# Patient Record
Sex: FEMALE | Race: WHITE | NOT HISPANIC OR LATINO | Employment: OTHER | URBAN - METROPOLITAN AREA
[De-identification: names, ages, dates, MRNs, and addresses within clinical notes are randomized per-mention and may not be internally consistent; named-entity substitution may affect disease eponyms.]

---

## 2017-11-30 ENCOUNTER — PATIENT OUTREACH (OUTPATIENT)
Dept: HEALTH INFORMATION MANAGEMENT | Facility: OTHER | Age: 52
End: 2017-11-30

## 2017-11-30 NOTE — PROGRESS NOTES
Outcome: Left Message    Please transfer to Patient Outreach Team at [phone] when patient returns call.    WebIZ Checked & Epic Updated:  yes    [facility] Verified: yes    Attempt # 1

## 2017-12-20 ENCOUNTER — APPOINTMENT (OUTPATIENT)
Dept: RADIOLOGY | Facility: MEDICAL CENTER | Age: 52
End: 2017-12-20
Attending: GENERAL PRACTICE
Payer: MEDICARE

## 2018-01-03 ENCOUNTER — HOSPITAL ENCOUNTER (OUTPATIENT)
Dept: RADIOLOGY | Facility: MEDICAL CENTER | Age: 53
End: 2018-01-03
Attending: GENERAL PRACTICE
Payer: MEDICARE

## 2018-01-03 DIAGNOSIS — Z12.39 SCREENING FOR BREAST CANCER: ICD-10-CM

## 2018-01-03 PROCEDURE — 77067 SCR MAMMO BI INCL CAD: CPT

## 2018-03-07 ENCOUNTER — HOSPITAL ENCOUNTER (OUTPATIENT)
Facility: MEDICAL CENTER | Age: 53
End: 2018-03-07
Attending: GENERAL PRACTICE
Payer: MEDICARE

## 2018-03-07 LAB
BASOPHILS # BLD AUTO: 0.9 % (ref 0–1.8)
BASOPHILS # BLD: 0.09 K/UL (ref 0–0.12)
EOSINOPHIL # BLD AUTO: 0.17 K/UL (ref 0–0.51)
EOSINOPHIL NFR BLD: 1.7 % (ref 0–6.9)
ERYTHROCYTE [DISTWIDTH] IN BLOOD BY AUTOMATED COUNT: 43.2 FL (ref 35.9–50)
ESTRADIOL SERPL-MCNC: 21 PG/ML
HCT VFR BLD AUTO: 52.2 % (ref 37–47)
HGB BLD-MCNC: 17 G/DL (ref 12–16)
IMM GRANULOCYTES # BLD AUTO: 0.02 K/UL (ref 0–0.11)
IMM GRANULOCYTES NFR BLD AUTO: 0.2 % (ref 0–0.9)
LYMPHOCYTES # BLD AUTO: 3.29 K/UL (ref 1–4.8)
LYMPHOCYTES NFR BLD: 32.3 % (ref 22–41)
MCH RBC QN AUTO: 30.6 PG (ref 27–33)
MCHC RBC AUTO-ENTMCNC: 32.6 G/DL (ref 33.6–35)
MCV RBC AUTO: 94.1 FL (ref 81.4–97.8)
MONOCYTES # BLD AUTO: 0.49 K/UL (ref 0–0.85)
MONOCYTES NFR BLD AUTO: 4.8 % (ref 0–13.4)
NEUTROPHILS # BLD AUTO: 6.13 K/UL (ref 2–7.15)
NEUTROPHILS NFR BLD: 60.1 % (ref 44–72)
NRBC # BLD AUTO: 0 K/UL
NRBC BLD-RTO: 0 /100 WBC
PLATELET # BLD AUTO: 311 K/UL (ref 164–446)
PMV BLD AUTO: 12 FL (ref 9–12.9)
RBC # BLD AUTO: 5.55 M/UL (ref 4.2–5.4)
WBC # BLD AUTO: 10.2 K/UL (ref 4.8–10.8)

## 2018-03-07 PROCEDURE — 80053 COMPREHEN METABOLIC PANEL: CPT

## 2018-03-07 PROCEDURE — 82785 ASSAY OF IGE: CPT

## 2018-03-07 PROCEDURE — 84403 ASSAY OF TOTAL TESTOSTERONE: CPT

## 2018-03-07 PROCEDURE — 84144 ASSAY OF PROGESTERONE: CPT

## 2018-03-07 PROCEDURE — 85025 COMPLETE CBC W/AUTO DIFF WBC: CPT

## 2018-03-07 PROCEDURE — 86003 ALLG SPEC IGE CRUDE XTRC EA: CPT | Mod: 91

## 2018-03-07 PROCEDURE — 82670 ASSAY OF TOTAL ESTRADIOL: CPT

## 2018-03-08 LAB
ALBUMIN SERPL BCP-MCNC: 4.8 G/DL (ref 3.2–4.9)
ALBUMIN/GLOB SERPL: 1.8 G/DL
ALP SERPL-CCNC: 85 U/L (ref 30–99)
ALT SERPL-CCNC: 31 U/L (ref 2–50)
ANION GAP SERPL CALC-SCNC: 16 MMOL/L (ref 0–11.9)
AST SERPL-CCNC: 23 U/L (ref 12–45)
BILIRUB SERPL-MCNC: 0.6 MG/DL (ref 0.1–1.5)
BUN SERPL-MCNC: 15 MG/DL (ref 8–22)
CALCIUM SERPL-MCNC: 9.8 MG/DL (ref 8.5–10.5)
CHLORIDE SERPL-SCNC: 107 MMOL/L (ref 96–112)
CO2 SERPL-SCNC: 20 MMOL/L (ref 20–33)
CREAT SERPL-MCNC: 0.93 MG/DL (ref 0.5–1.4)
GLOBULIN SER CALC-MCNC: 2.7 G/DL (ref 1.9–3.5)
GLUCOSE SERPL-MCNC: 92 MG/DL (ref 65–99)
POTASSIUM SERPL-SCNC: 4.5 MMOL/L (ref 3.6–5.5)
PROT SERPL-MCNC: 7.5 G/DL (ref 6–8.2)
SODIUM SERPL-SCNC: 143 MMOL/L (ref 135–145)

## 2018-03-10 LAB
A ALTERNATA IGE QN: <0.1 KU/L
A FUMIGATUS IGE QN: <0.1 KU/L
BERMUDA GRASS IGE QN: <0.1 KU/L
BOXELDER IGE QN: <0.1 KU/L
C SPHAEROSPERMUM IGE QN: <0.1 KU/L
CASHEW NUT IGE QN: <0.1 KU/L
CAT DANDER IGE QN: <0.1 KU/L
CMN PIGWEED IGE QN: <0.1 KU/L
COMMON RAGWEED IGE QN: <0.1 KU/L
COTTONWOOD IGE QN: <0.1 KU/L
COW MILK IGE QN: <0.1 KU/L
D FARINAE IGE QN: <0.1 KU/L
D PTERONYSS IGE QN: <0.1 KU/L
DEPRECATED MISC ALLERGEN IGE RAST QL: NORMAL
DEPRECATED MISC ALLERGEN IGE RAST QL: NORMAL
DOG DANDER IGE QN: <0.1 KU/L
IGE SERPL-ACNC: 5 KU/L
M RACEMOSUS IGE QN: <0.1 KU/L
MOUSE EPITH IGE QN: <0.1 KU/L
MT JUNIPER IGE QN: <0.1 KU/L
MUGWORT IGE QN: <0.1 KU/L
OLIVE POLN IGE QN: <0.1 KU/L
P NOTATUM IGE QN: <0.1 KU/L
PEANUT IGE QN: <0.1 KU/L
ROACH IGE QN: <0.1 KU/L
SALTWORT IGE QN: <0.1 KU/L
SOYBEAN IGE QN: <0.1 KU/L
TEST NAME 95000: NORMAL
TIMOTHY IGE QN: <0.1 KU/L
TUNA IGE QN: <0.1 KU/L
WALNUT IGE QN: <0.1 KU/L
WHEAT IGE QN: <0.1 KU/L
WHITE ELM IGE QN: <0.1 KU/L
WHITE MULBERRY IGE QN: <0.1 KU/L
WHITE OAK IGE QN: <0.1 KU/L

## 2018-03-11 LAB
ALMOND IGE QN: <0.1 KU/L
CODFISH IGE QN: <0.1 KU/L
COW MILK IGE QN: <0.1 KU/L
EGG WHITE IGE QN: <0.1 KU/L
HAZELNUT IGE QN: <0.1 KU/L
PEANUT IGE QN: <0.1 KU/L
SALMON IGE QN: <0.1 KU/L
SCALLOP IGE QN: <0.1 KU/L
SESAME SEED IGE QN: <0.1 KU/L
SHRIMP IGE QN: <0.1 KU/L
TEST NAME 95000: NORMAL

## 2018-05-31 ENCOUNTER — TELEPHONE (OUTPATIENT)
Dept: MEDICAL GROUP | Facility: MEDICAL CENTER | Age: 53
End: 2018-05-31

## 2018-05-31 DIAGNOSIS — Z79.890 HORMONE REPLACEMENT THERAPY: ICD-10-CM

## 2018-05-31 NOTE — TELEPHONE ENCOUNTER
1. Caller Name: reed                                         Call Back Number: [phone] (home)         Patient approves a detailed voicemail message: yes    patient is new to youon 06/14/2018 at 2pm she would like to know if you would be able to give her enough estradiol patches to last her until appt. Per patient she cant request refill previous PCP due to office already closed. Please advise

## 2018-06-14 ENCOUNTER — OFFICE VISIT (OUTPATIENT)
Dept: MEDICAL GROUP | Facility: MEDICAL CENTER | Age: 53
End: 2018-06-14
Payer: MEDICARE

## 2018-06-14 VITALS
DIASTOLIC BLOOD PRESSURE: 78 MMHG | SYSTOLIC BLOOD PRESSURE: 118 MMHG | WEIGHT: 190.4 LBS | BODY MASS INDEX: 27.26 KG/M2 | TEMPERATURE: 96.8 F | OXYGEN SATURATION: 97 % | HEIGHT: 70 IN | HEART RATE: 88 BPM | RESPIRATION RATE: 16 BRPM

## 2018-06-14 DIAGNOSIS — N95.2 ATROPHIC VAGINITIS: ICD-10-CM

## 2018-06-14 DIAGNOSIS — Z12.11 SCREENING FOR COLORECTAL CANCER: ICD-10-CM

## 2018-06-14 DIAGNOSIS — Z79.890 HORMONE REPLACEMENT THERAPY (HRT): ICD-10-CM

## 2018-06-14 DIAGNOSIS — Z13.6 SCREENING FOR CARDIOVASCULAR CONDITION: ICD-10-CM

## 2018-06-14 DIAGNOSIS — R63.5 WEIGHT GAIN: ICD-10-CM

## 2018-06-14 DIAGNOSIS — Z86.69 HISTORY OF SLEEP APNEA: ICD-10-CM

## 2018-06-14 DIAGNOSIS — Z12.12 SCREENING FOR COLORECTAL CANCER: ICD-10-CM

## 2018-06-14 DIAGNOSIS — F17.210 CIGARETTE NICOTINE DEPENDENCE WITHOUT COMPLICATION: ICD-10-CM

## 2018-06-14 PROCEDURE — 99204 OFFICE O/P NEW MOD 45 MIN: CPT | Performed by: NURSE PRACTITIONER

## 2018-06-14 RX ORDER — CITALOPRAM 20 MG/1
TABLET ORAL
COMMUNITY
Start: 2018-03-19 | End: 2018-06-14 | Stop reason: SDUPTHER

## 2018-06-14 RX ORDER — ESTRADIOL 0.05 MG/D
1 PATCH TRANSDERMAL
Qty: 4 PATCH | Refills: 4 | Status: SHIPPED | OUTPATIENT
Start: 2018-06-14 | End: 2018-10-29 | Stop reason: SDUPTHER

## 2018-06-14 RX ORDER — CITALOPRAM 20 MG/1
20 TABLET ORAL DAILY
Qty: 30 TAB | Refills: 6 | Status: SHIPPED | OUTPATIENT
Start: 2018-06-14 | End: 2019-01-15 | Stop reason: SDUPTHER

## 2018-06-14 RX ORDER — PHENTERMINE HYDROCHLORIDE 37.5 MG/1
37.5 CAPSULE ORAL EVERY MORNING
Qty: 30 CAP | Refills: 0 | Status: SHIPPED | OUTPATIENT
Start: 2018-06-14 | End: 2018-07-14

## 2018-06-14 RX ORDER — ESTRADIOL 0.03 MG/D
FILM, EXTENDED RELEASE TRANSDERMAL
COMMUNITY
Start: 2018-04-25 | End: 2018-06-14

## 2018-06-14 ASSESSMENT — PATIENT HEALTH QUESTIONNAIRE - PHQ9: CLINICAL INTERPRETATION OF PHQ2 SCORE: 0

## 2018-06-14 NOTE — LETTER
[employer]  MATILDE FunezP.R.N.  [address] [facility] [address]  [place] [address]  Fax: [phone]   Authorization for Release/Disclosure of   Protected Health Information   Name: JESSICA DAVIS : 1965 SSN: [id number]   Address: [address] [place] [address]  [place] [address] Phone:    [phone] (home)    I authorize the entity listed below to release/disclose the PHI below to:   [employer]/Christie Delatorre A.P.R.N. and LAUREN Funez.P.R.N.   Provider or Entity Name:  Dr. Knight   Address   City, [facility]   Phone:      Fax:     Reason for request: continuity of care   Information to be released:    [  ] LAST COLONOSCOPY,  including any PATH REPORT and follow-up  [  ] LAST FIT/COLOGUARD RESULT [  ] LAST DEXA  [  ] LAST MAMMOGRAM  [  ] LAST PAP  [  ] LAST LABS [  ] RETINA EXAM REPORT  [  ] IMMUNIZATION RECORDS  [ x ] Release all info      [  ] Check here and initial the line next to each item to release ALL health information INCLUDING  _____ Care and treatment for drug and / or alcohol abuse  _____ HIV testing, infection status, or AIDS  _____ Genetic Testing    DATES OF SERVICE OR TIME PERIOD TO BE DISCLOSED: _____________  I understand and acknowledge that:  * This Authorization may be revoked at any time by you in writing, except if your health information has already been used or disclosed.  * Your health information that will be used or disclosed as a result of you signing this authorization could be re-disclosed by the recipient. If this occurs, your re-disclosed health information may no longer be protected by State or Federal laws.  * You may refuse to sign this Authorization. Your refusal will not affect your ability to obtain treatment.  * This Authorization becomes effective upon signing and will  on (date) __________.      If no date is indicated, this Authorization will  one (1) year from the signature date.    Name: Jessica Davis    Signature:   Date:       6/14/2018       PLEASE FAX REQUESTED RECORDS BACK TO: [phone]

## 2018-06-14 NOTE — Clinical Note
CHART REVIEW. Also how likely is PV.. I am repeating the CBC should I order Jak2 or epo or do the repeat CBC firs then go from there.

## 2018-06-15 ENCOUNTER — SUPERVISING PHYSICIAN REVIEW (OUTPATIENT)
Dept: MEDICAL GROUP | Facility: MEDICAL CENTER | Age: 53
End: 2018-06-15

## 2018-06-15 PROBLEM — Z13.6 SCREENING FOR CARDIOVASCULAR CONDITION: Status: RESOLVED | Noted: 2018-06-15 | Resolved: 2018-06-15

## 2018-06-15 PROBLEM — Z86.69 HISTORY OF SLEEP APNEA: Status: ACTIVE | Noted: 2018-06-15

## 2018-06-15 PROBLEM — Z13.6 SCREENING FOR CARDIOVASCULAR CONDITION: Status: ACTIVE | Noted: 2018-06-15

## 2018-06-15 NOTE — PROGRESS NOTES
"CC: To establish care and medication refill      Jessica Davis is a 52 y.o. female here to establish care and to discuss the evaluation and management of:    1. Hormone replacement therapy (HRT)  Patient states that she has been taking the estradiol patches for approximately 2 years. States that she's been on the 0.025mg  dose twice weekly. Has not followed up with GYN in over 2 years as her previous provider has since left the office she was going to. Patient states that she also takes citalopram her to help with her\"short fuse\" that she has. Denies any depression.    2  Atrophic vaginitis  Patient states that she's been having vaginal itching for several years. States that she is been tested for numerous amounts of allergies and nothing has come up suggesting that she has some sort of allergy causing this vaginal itching. Patient states that she does suffer from vaginal dryness as well. States she has tried coconut oil, topical estrogen and numerous other products. Patient states she's also had a biopsy completed to see if there are some sort of growth or skin disorder and reports that there is no definitive diagnosis.    3. Weight gain  Patient states that she last took phentermine and October of last year. States that she'll take it every 3rd day in conjunction with working on her diet. States that she has been having some weight gain recently and is requesting a refill on phentermine. States tolerated it well without any heart palpitations, racing heart, elevated blood pressure or chest pain.    4. Cigarette nicotine dependence without complication  Patient does continue to smoke cigarettes daily.    5. Screening for colorectal cancer  Has not had any colorectal cancer screening    6. History of sleep apnea  Patient states that she was told that she has a history of having sleep apnea and has had a sleep study done. Patient states that she has a very \"mild form.\" States that at some point she tried a " "CPAP, but does not currently wear,  however is not interested in going back to have sleep studies done nor interested in wearing a CPAP. States she thinks she just needs \"a little oxygen at nighttime \"states she might just try using her mother's oxygen at nighttime. Does state she does have fatigue from today.      ROS:  Denies any Headache, Blurred Vision, Confusion Chest pain,  Shortness of breath,  Abdominal pain, Changes of bowel or bladder, Lower ext edema, Fevers, Nights sweats, Weight Changes, Focal weakness or numbness.  All other systems are negative. Vaginal itching and dryness, fatigue, weight gain      Current Outpatient Prescriptions:   •  estradiol (CLIMARA) 0.05 MG/24HR PATCH WEEKLY, Apply 1 Patch to skin as directed every 7 days., Disp: 4 Patch, Rfl: 4  •  citalopram (CELEXA) 20 MG Tab, Take 1 Tab by mouth every day., Disp: 30 Tab, Rfl: 6  •  phentermine 37.5 MG capsule, Take 1 Cap by mouth every morning for 30 days., Disp: 30 Cap, Rfl: 0    Allergies   Allergen Reactions   • Percocet [Apap-Fd&C Red #40 [place]-Oxycodone] Hives     itching   • Vicodin [Hydrocodone-Acetaminophen] Hives   • Nkda [No Known Drug Allergy]        Past Medical History:   Diagnosis Date   • Arthritis     back   • Gynecological disorder     had hysterectomy   • Hypertension    • S/P endometrial ablation 3/2/2016    2005 ablation done. EMB in 2009- benign proliferative.    • Sleep apnea     mild- no cpap   • Snoring    • Thickened endometrium 3/28/2016     Past Surgical History:   Procedure Laterality Date   • CYSTOSCOPY N/A 5/27/2016    Procedure: CYSTOSCOPY Possible;  Surgeon: Amanda Ramey M.D.;  Location: SURGERY SAME DAY [facility];  Service:    • VAGINAL HYSTERECTOMY SCOPE TOTAL N/A 5/27/2016    Procedure: ATTEMPTED VAGINAL HYSTERECTOMY SCOPE TOTAL WITH BILATERAL SALPINGECTOMY converted to laparotomy;  Surgeon: Amanda Ramey M.D.;  Location: SURGERY SAME DAY [facility];  Service:    • ABDOMINAL HYSTERECTOMY " "TOTAL N/A 5/27/2016    Procedure: ABDOMINAL HYSTERECTOMY TOTAL WITH BILATERAL SALPINGectomy, Left OOPHERECTOMY. dense pelvic adhesiolysis.   partial omentectomy. ;  Surgeon: Amanda Ramey M.D.;  Location: SURGERY SAME DAY [facility];  Service:    • HYSTEROSCOPY WITH VIDEO DIAGNOSTIC N/A 3/28/2016    Procedure: HYSTEROSCOPY WITH VIDEO DIAGNOSTIC AND VULVAR BIOPSY;  Surgeon: Amanda Ramey M.D.;  Location: SURGERY SAME DAY [facility];  Service:    • DILATION AND CURETTAGE N/A 3/28/2016    Procedure: DILATION AND CURETTAGE;  Surgeon: Amanda Ramey M.D.;  Location: SURGERY SAME DAY [facility];  Service:    • CHOLECYSTECTOMY     • GYN SURGERY      ablation   • OTHER      left breast lumpectomy   • OTHER NEUROLOGICAL SURG      back surgery x2     Family History   Problem Relation Age of Onset   • Dementia Mother    • Alzheimer's Disease Father    • Cancer Father      prostate, stage four pelvic      Social History     Social History   • Marital status:      Spouse name: N/A   • Number of children: N/A   • Years of education: N/A     Occupational History   • Not on file.     Social History Main Topics   • Smoking status: Current Every Day Smoker     Packs/day: 0.50     Years: 35.00     Types: Cigarettes   • Smokeless tobacco: Never Used   • Alcohol use Yes      Comment: occ   • Drug use: No   • Sexual activity: Not on file     Other Topics Concern   • Not on file     Social History Narrative   • No narrative on file       Objective:     Vitals: /78   Pulse 88   Temp 36 °C (96.8 °F)   Resp 16   Ht 1.777 m (5' 9.98\")   Wt 86.4 kg (190 lb 6.4 oz)   SpO2 97%   BMI 27.34 kg/m²      General: Alert, pleasant, NAD  HEENT:  Normocephalic. Neck supple.  No thyromegaly or masses palpated. No cervical or supraclavicular lymphadenopathy.  Heart:  Regular rate and rhythm.  S1 and S2 normal.  No murmurs appreciated.  Respiratory:  Normal respiratory effort.  Clear to auscultation bilaterally.  "   Skin:  Warm, dry, no rashes  Musculoskeletal:  Gait is normal.  Moves all extremities well.  Extremities:   No leg edema.  Neurological: No tremors, sensation grossly intact  Psych:  Affect/mood is normal, judgement is good, memory is intact, grooming is appropriate.      Assessment and Plan.   52 y.o. Female to establish care and discuss the followin. Hormone replacement therapy (HRT)  Discussed with patient that long-term use of hormone replacement therapy is not recommended. We will keep her on the low dose and change her from twice weekly to once weekly of the 0.05 milligram patches. Also discussed with patient that she will need to establish with another GYN provider to follow up with hormone replacement therapy..    2. Atrophic vaginitis  Chronic. Patient states that she's been worked up for multitude of diagnoses that could be causing the itching in her vagina. States that she's been using grapeseed oil and that seems to be moderately effective at this time. States she's also had biopsies completed by her GYN in the past with no definitive diagnosis. Advised patient to try OTC Replens to see if this can moisturize and help reduce dryness.     3. Weight gain  Had a long discussion with the patient that this medication is used in conjunction with diet and exercise. She'll need to continue to work on having a heart healthy diet, rich in fruits and vegetables and whole grains, limits the consumption of greasy, fatty, high sugary foods. Narx Check completed. This is the first prescription for this medication this year.   - phentermine 37.5 MG capsule; Take 1 Cap by mouth every morning for 30 days.  Dispense: 30 Cap; Refill: 0    4. Cigarette nicotine dependence without complication  Discussed with patient the importance of reducing tobacco consumption. Educated patient regarding the effects of tobacco use on cardiovascular system.    5. Screening for colorectal cancer    - OCCULT BLOOD FECES IMMUNOASSAY  (FIT); Future    6. History of sleep apnea  Patient declines referral back to sleep studies for assessment of sleep apnea and need for CPAP machine. Patient states that she is not interested in doing this at this time. Discussed with patient long-term side effects of uncontrolled sleep apnea. Patient also did have an elevated hemoglobin 17 and hematocrit 52.2 on her most recent labs March 2018 labs. Could be due to sleep apnea or underlying diagnosis of ? Polycythemia Vera.  - CBC WITHOUT DIFFERENTIAL; Future    7. Screening for cardiovascular condition    - LIPID PROFILE; Future      Health Maintenance:     Return in about 3 months (around 9/14/2018).        Christie GRAVES.

## 2018-06-15 NOTE — PROGRESS NOTES
I have reviewed and agree with history, assessment, and plan for office encounter on 6/14/2018 with KEVIN Rizvi.    Face to face encounter/direct observation: no    Suggested changes to plan or follow-up: none

## 2018-08-07 ENCOUNTER — HOSPITAL ENCOUNTER (OUTPATIENT)
Dept: LAB | Facility: MEDICAL CENTER | Age: 53
End: 2018-08-07
Attending: NURSE PRACTITIONER
Payer: MEDICARE

## 2018-08-07 DIAGNOSIS — Z13.6 SCREENING FOR CARDIOVASCULAR CONDITION: ICD-10-CM

## 2018-08-07 DIAGNOSIS — Z86.69 HISTORY OF SLEEP APNEA: ICD-10-CM

## 2018-08-07 LAB
CHOLEST SERPL-MCNC: 236 MG/DL (ref 100–199)
ERYTHROCYTE [DISTWIDTH] IN BLOOD BY AUTOMATED COUNT: 42 FL (ref 35.9–50)
HCT VFR BLD AUTO: 50.5 % (ref 37–47)
HDLC SERPL-MCNC: 42 MG/DL
HGB BLD-MCNC: 16.3 G/DL (ref 12–16)
LDLC SERPL CALC-MCNC: 134 MG/DL
MCH RBC QN AUTO: 29.7 PG (ref 27–33)
MCHC RBC AUTO-ENTMCNC: 32.3 G/DL (ref 33.6–35)
MCV RBC AUTO: 92 FL (ref 81.4–97.8)
PLATELET # BLD AUTO: 292 K/UL (ref 164–446)
PMV BLD AUTO: 11.3 FL (ref 9–12.9)
RBC # BLD AUTO: 5.49 M/UL (ref 4.2–5.4)
TRIGL SERPL-MCNC: 299 MG/DL (ref 0–149)
WBC # BLD AUTO: 10.3 K/UL (ref 4.8–10.8)

## 2018-08-07 PROCEDURE — 85027 COMPLETE CBC AUTOMATED: CPT

## 2018-08-07 PROCEDURE — 36415 COLL VENOUS BLD VENIPUNCTURE: CPT

## 2018-08-07 PROCEDURE — 80061 LIPID PANEL: CPT

## 2018-08-08 PROBLEM — E78.5 DYSLIPIDEMIA: Status: ACTIVE | Noted: 2018-08-08

## 2018-09-05 ENCOUNTER — HOSPITAL ENCOUNTER (OUTPATIENT)
Facility: MEDICAL CENTER | Age: 53
End: 2018-09-05
Attending: NURSE PRACTITIONER
Payer: MEDICARE

## 2018-09-05 PROCEDURE — 82274 ASSAY TEST FOR BLOOD FECAL: CPT

## 2018-09-09 DIAGNOSIS — Z12.12 SCREENING FOR COLORECTAL CANCER: ICD-10-CM

## 2018-09-09 DIAGNOSIS — Z12.11 SCREENING FOR COLORECTAL CANCER: ICD-10-CM

## 2018-09-09 LAB — HEMOCCULT STL QL IA: NEGATIVE

## 2018-10-29 RX ORDER — ESTRADIOL 0.05 MG/D
1 PATCH TRANSDERMAL
Qty: 4 PATCH | Refills: 4 | Status: SHIPPED | OUTPATIENT
Start: 2018-10-29 | End: 2019-07-18 | Stop reason: SDUPTHER

## 2018-11-06 ENCOUNTER — OFFICE VISIT (OUTPATIENT)
Dept: MEDICAL GROUP | Facility: MEDICAL CENTER | Age: 53
End: 2018-11-06
Payer: MEDICARE

## 2018-11-06 VITALS
RESPIRATION RATE: 16 BRPM | WEIGHT: 197 LBS | BODY MASS INDEX: 29.18 KG/M2 | SYSTOLIC BLOOD PRESSURE: 144 MMHG | OXYGEN SATURATION: 96 % | HEART RATE: 80 BPM | HEIGHT: 69 IN | TEMPERATURE: 98.6 F | DIASTOLIC BLOOD PRESSURE: 86 MMHG

## 2018-11-06 DIAGNOSIS — Z88.9 MULTIPLE ALLERGIES: ICD-10-CM

## 2018-11-06 DIAGNOSIS — F17.210 CIGARETTE NICOTINE DEPENDENCE WITHOUT COMPLICATION: ICD-10-CM

## 2018-11-06 DIAGNOSIS — L29.9 ITCHY SKIN: ICD-10-CM

## 2018-11-06 DIAGNOSIS — L30.9 ECZEMA, UNSPECIFIED TYPE: ICD-10-CM

## 2018-11-06 PROCEDURE — 99214 OFFICE O/P EST MOD 30 MIN: CPT | Performed by: FAMILY MEDICINE

## 2018-11-06 RX ORDER — TRIAMCINOLONE ACETONIDE 1 MG/G
CREAM TOPICAL
Qty: 60 G | Refills: 1 | Status: SHIPPED | OUTPATIENT
Start: 2018-11-06

## 2018-11-06 NOTE — PATIENT INSTRUCTIONS
Chantix/varenicline:   Initial:  Days 1 to 3: 0.5 mg once daily  Days 4 to 7: 0.5 mg twice daily  Maintenance (? Day 8): 1 mg twice daily for 11 weeks; may consider a temporary or permanent dose reduction if usual dose is not tolerated.    Note: Start 1 week before target quit date.  If able to successfully quit smoking at the end of the 12 weeks, may continue for another 12 weeks to help maintain success.

## 2018-11-06 NOTE — PROGRESS NOTES
"cc: Allergies    Subjective:     Jessica Davis is a 52 y.o. female presenting for a Kenalog shot for allergies.  She reports getting Kenalog shots every 6 months for the past several years.  Her last dose was about a year ago.  She has never seen an allergist.  She states that she usually gets a Kenalog shots for skin allergies.  She describes itchy skin on her shoulders bilaterally that is quite intrusive.  She uses hydrocortisone cream which is somewhat helpful.  She also uses Benadryl at night, nasal sprays.  She is also tried multiple different lotions over-the-counter, which have not been helpful.  She does smoke tobacco.  She believes she is allergic to multiple things, but is not sure what exactly are her triggers.  She reports getting blood tests for multiple different allergens in March, but nothing definitive was seen.        Review of systems:  See above.       Current Outpatient Prescriptions:   •  triamcinolone acetonide (KENALOG) 0.1 % Cream, Apply thin layer to affected area twice a day as needed for eczema, do not use longer than 14 days at a time., Disp: 60 g, Rfl: 1  •  varenicline (CHANTIX STARTING MONTH PAK) 0.5 MG X 11 & 1 MG X 42 tablet, Days 1-3: 0.5 mg once daily. Days 4-7: 0.5 mg twice daily. Then 1 mg twice daily., Disp: 56 Tab, Rfl: 0  •  estradiol (CLIMARA) 0.05 MG/24HR PATCH WEEKLY, Apply 1 Patch to skin as directed every 7 days., Disp: 4 Patch, Rfl: 4  •  hydrocortisone 2.5 % Cream topical cream, Apply 1 g to affected area(s) 2 times a day., Disp: 1 Tube, Rfl: 1  •  citalopram (CELEXA) 20 MG Tab, Take 1 Tab by mouth every day., Disp: 30 Tab, Rfl: 6    Allergies, past medical history, past surgical history, family history, social history reviewed and updated    Objective:     Vitals: /86 (BP Location: Right arm, Patient Position: Sitting)   Pulse 80   Temp 37 °C (98.6 °F) (Temporal)   Resp 16   Ht 1.753 m (5' 9\")   Wt 89.4 kg (197 lb)   SpO2 96%   BMI 29.09 kg/m² "   General: Alert, pleasant, NAD  HEENT: Normocephalic.    Skin: Warm, dry.  No rashes over the left shoulder and upper extremity.  Faint scaley, dry patches on the right shoulder  Psych:  Affect/mood is normal, judgement is good, memory is intact, grooming is appropriate.    Assessment/Plan:     Diagnoses and all orders for this visit:    Eczema, unspecified type  Itchy skin  Multiple allergies  Discussed holding off on the Kenalog injections today, discussed risks of Kenalog .  Instead, recommended triamcinolone cream to the affected areas.  Recommended discussing with a specialist if she feels like she needs kenalog, referral placed.  She will follow-up with her regular provider in 3 months.  -     REFERRAL TO ALLERGY        -     triamcinolone acetonide (KENALOG) 0.1 % Cream; Apply thin layer to affected area twice a day as needed for eczema, do not use longer than 14 days at a time.    Cigarette nicotine dependence without complication  Advised to quit, patient is somewhat interested.  Discussed trying Chantix, prescription sent to pharmacy  -     varenicline (CHANTIX STARTING MONTH PAK) 0.5 MG X 11 & 1 MG X 42 tablet; Days 1-3: 0.5 mg once daily. Days 4-7: 0.5 mg twice daily. Then 1 mg twice daily.        Return in about 3 months (around 2/6/2019) for routine follow up.

## 2019-01-14 ENCOUNTER — TELEPHONE (OUTPATIENT)
Dept: MEDICAL GROUP | Facility: PHYSICIAN GROUP | Age: 54
End: 2019-01-14

## 2019-01-14 NOTE — TELEPHONE ENCOUNTER
Future Appointments       Provider Department Center    1/15/2019 2:25 PM Jackeline Quinn P.A.-C. [facility]    3/12/2019 3:00 PM [facility] [address]        ESTABLISHED PATIENT PRE-VISIT PLANNING     Patient was NOT contacted to complete PVP.     Note: Patient will not be contacted if there is no indication to call.     1.  Reviewed notes from the last few office visits within the medical group: Yes    2.  If any orders were placed at last visit or intended to be done for this visit (i.e. 6 mos follow-up), do we have Results/Consult Notes?        •  Labs - Labs were not ordered at last office visit.       •  Imaging - Imaging was not ordered at last office visit.       •  Referrals - Referral ordered, patient was seen and consult notes are in chart. Care Teams updated  YES.    3. Is this appointment scheduled as a Hospital Follow-Up? No    4.  Immunizations were updated in [facility] using WebIZ?: Yes       •  Web Iz Recommendations: MMR , TDAP and SHINGRIX (Shingles)    5.  Patient is due for the following Health Maintenance Topics:   Health Maintenance Due   Topic Date Due   • Annual Wellness Visit  1965   • IMM DTaP/Tdap/Td Vaccine (1 - Tdap) 11/21/1983   • MAMMOGRAM  01/03/2019       6. Orders for overdue Health Maintenance topics pended in Pre-Charting? YES    7.  AHA (MDX) form printed for Provider? YES    8.  Patient was informed to arrive 15 min prior to their scheduled appointment and bring in their medication bottles. Confirmed through automated call

## 2019-01-15 ENCOUNTER — OFFICE VISIT (OUTPATIENT)
Dept: MEDICAL GROUP | Facility: PHYSICIAN GROUP | Age: 54
End: 2019-01-15
Payer: MEDICARE

## 2019-01-15 VITALS
HEART RATE: 80 BPM | TEMPERATURE: 99.3 F | BODY MASS INDEX: 29.7 KG/M2 | SYSTOLIC BLOOD PRESSURE: 122 MMHG | HEIGHT: 68 IN | WEIGHT: 196 LBS | OXYGEN SATURATION: 95 % | DIASTOLIC BLOOD PRESSURE: 82 MMHG

## 2019-01-15 DIAGNOSIS — R45.4 IRRITABILITY: ICD-10-CM

## 2019-01-15 DIAGNOSIS — Z79.890 HORMONE REPLACEMENT THERAPY (HRT): ICD-10-CM

## 2019-01-15 DIAGNOSIS — D58.2 ELEVATED HEMOGLOBIN (HCC): ICD-10-CM

## 2019-01-15 DIAGNOSIS — Z86.69 HISTORY OF SLEEP APNEA: ICD-10-CM

## 2019-01-15 DIAGNOSIS — R21 RASH AND NONSPECIFIC SKIN ERUPTION: ICD-10-CM

## 2019-01-15 DIAGNOSIS — N95.2 ATROPHIC VAGINITIS: ICD-10-CM

## 2019-01-15 DIAGNOSIS — E78.5 DYSLIPIDEMIA: ICD-10-CM

## 2019-01-15 DIAGNOSIS — Z12.39 ENCOUNTER FOR SCREENING FOR MALIGNANT NEOPLASM OF BREAST: ICD-10-CM

## 2019-01-15 DIAGNOSIS — F17.210 CIGARETTE NICOTINE DEPENDENCE WITHOUT COMPLICATION: ICD-10-CM

## 2019-01-15 DIAGNOSIS — F41.9 ANXIETY: ICD-10-CM

## 2019-01-15 PROCEDURE — 99214 OFFICE O/P EST MOD 30 MIN: CPT | Performed by: PHYSICIAN ASSISTANT

## 2019-01-15 PROCEDURE — 8041 PR SCP AHA: Performed by: PHYSICIAN ASSISTANT

## 2019-01-15 RX ORDER — CITALOPRAM 20 MG/1
20 TABLET ORAL DAILY
Qty: 90 TAB | Refills: 2 | Status: SHIPPED | OUTPATIENT
Start: 2019-01-15 | End: 2019-09-24

## 2019-01-15 ASSESSMENT — PATIENT HEALTH QUESTIONNAIRE - PHQ9: CLINICAL INTERPRETATION OF PHQ2 SCORE: 0

## 2019-01-15 NOTE — ASSESSMENT & PLAN NOTE
"Elevated cholesterol per lab review, most recently in 8/2018 (below). Patient not currently on any medication for her cholesterol but was on statin previously--\"years ago\" which she states she tolerated fine.    Cholesterol,Tot 236   100 - 199 mg/dL Final   Triglycerides 299   0 - 149 mg/dL Final   HDL 42  >=40 mg/dL Final      <100 mg/dL Final     "

## 2019-01-15 NOTE — ASSESSMENT & PLAN NOTE
Patient has been on HRT since 2016 after she had hysterectomy. Symptoms included hot flashes (which she states started even before the hysterectomy), vaginal dryness, and itching. Initially was using Estring for a short period of time but now is on Climara 0.05 mg/24 HR patch which she changes weekly. Feels that the medication helps with hot flashes and vaginal dryness but not so much with the itching. Has been seeing a dermatologist for this.

## 2019-01-15 NOTE — ASSESSMENT & PLAN NOTE
Patient smokes about 1/2 pack per day and has been smoking for the last 35 years. She would like to quit, but endorses a lot of current anxiety so does not feel she would be able to quit at this time. Previous PCP gave her script for Chantix which she hasn't filled.

## 2019-01-15 NOTE — ASSESSMENT & PLAN NOTE
Ongoing issues with skin rash/itching, worst on the upper arms/shoulders. Is actively seeing dermatologist and was recently switched from hydrocortisone cream to triamcinolone cream which she feels is working much better. States will be having allergy skin testing in the near future to rule out allergy that could be contributing to the rash.

## 2019-01-15 NOTE — PROGRESS NOTES
"Subjective:     Jessica Davis is a 53 y.o. female here today for anxiety/irritability, follow-up on chronic conditions, and Annual Health Assessment. Is a new patient to me and is also establishing care today.    Previous PCP: Franklin Gan MD    HPI: Patient has the following current medical problems:    Hormone replacement therapy (HRT)  Patient has been on HRT since 2016 after she had hysterectomy. Symptoms included hot flashes (which she states started even before the hysterectomy), vaginal dryness, and itching. Initially was using Estring for a short period of time but now is on Climara 0.05 mg/24 HR patch which she changes weekly. Feels that the medication helps with hot flashes and vaginal dryness but not so much with the itching. Has been seeing a dermatologist for this.    History of sleep apnea  Patient endorses previous sleep study \"years ago\" and was told sleep apnea was \"very mild.\" Previous PCP did overnight oximetry test a couple of years ago which was abnormally low. She states it was recommended that she start on supplemental O2, but was never notified that anything was ordered for her.    Dyslipidemia  Elevated cholesterol per lab review, most recently in 8/2018 (below). Patient not currently on any medication for her cholesterol but was on statin previously--\"years ago\" which she states she tolerated fine.    Cholesterol,Tot 236   100 - 199 mg/dL Final   Triglycerides 299   0 - 149 mg/dL Final   HDL 42  >=40 mg/dL Final      <100 mg/dL Final       Cigarette nicotine dependence without complication  Patient smokes about 1/2 pack per day and has been smoking for the last 35 years. She would like to quit, but endorses a lot of current anxiety so does not feel she would be able to quit at this time. Previous PCP gave her script for Chantix which she hasn't filled.    Elevated hemoglobin (HCC)  Noted on previous lab work as far back as 2015. Last CBC in 8/2018.    Rash and nonspecific " "skin eruption  Ongoing issues with skin rash/itching, worst on the upper arms/shoulders. Is actively seeing dermatologist and was recently switched from hydrocortisone cream to triamcinolone cream which she feels is working much better. States will be having allergy skin testing in the near future to rule out allergy that could be contributing to the rash.    Patient also has new concern today for possible ADHD. She states that her adult daughter was just diagnosed with \"adult ADD\" and adult son has as well. She has been doing some research online and feels that a lot of things she has been experiencing may be due to ADD.  She historically has been treated with SSRI--currently citalopram--for irritability/\"short fuse.\" She does feel that it is helping. States that if she's not on her citalopram, she gets \"very overwhelmed\" and will \"blow-up.\" Will watch movie at the theater and not be able to recall what happened in the movie right after it finishes. Has difficulty with retaining information when she reads. Will re-read things multiple times and still not be able to retain the content. Describes feeling anxious when too many things get on top of her. Worries frequently about things which sometimes can interfere with sleep. She is currently retired. At her previous job at the [employer], recalls always putting off the difficult reports to the very last minute. Will be going back to work in the near future and is nervous about how she'll get things done. She has an appointment scheduled with Behavior[facility] for March but is wanting to know if stimulant medication is a possibility to see if it helps.      Health Maintenance Summary                Annual Wellness Visit Overdue 1965     IMM DTaP/Tdap/Td Vaccine Overdue 11/21/1983     MAMMOGRAM Overdue 1/3/2019      Done 1/3/2018 MA-MAMMO SCREENING BILAT W/TOMOSYNTHESIS W/CAD    IMM ZOSTER VACCINES Postponed 11/6/2019 Originally 11/21/2014. Patient " Refused    COLON CANCER SCREENING ANNUAL FIT Next Due 9/5/2019      Done 9/5/2018 OCCULT BLOOD FECES IMMUNOASSAY          Annual Health Assessment Questions:     1.  Are you currently engaging in any exercise or physical activity? Yes    2.  How would you describe your mood or emotional well-being today? anxious    3.  Have you had any falls in the last year? No    4.  Have you noticed any problems with your balance or had difficulty walking? YES Muscle problems in back and leg     5.  In the last six months have you experienced any leakage of urine? No    6. DPA/Advanced Directive: Patient does not have an Advanced Directive.  A packet and workshop information was given on Advanced Directives.      Current medicines (including changes today)  Current Outpatient Prescriptions   Medication Sig Dispense Refill   • citalopram (CELEXA) 20 MG Tab Take 1 Tab by mouth every day. 90 Tab 2   • triamcinolone acetonide (KENALOG) 0.1 % Cream Apply thin layer to affected area twice a day as needed for eczema, do not use longer than 14 days at a time. 60 g 1   • estradiol (CLIMARA) 0.05 MG/24HR PATCH WEEKLY Apply 1 Patch to skin as directed every 7 days. 4 Patch 4     No current facility-administered medications for this visit.        She  has a past medical history of Arthritis; Gynecological disorder; Hypertension; S/P endometrial ablation (3/2/2016); Sleep apnea; Snoring; and Thickened endometrium (3/28/2016).    Percocet [apap-fd&c red #40 [place]-oxycodone]; Vicodin [hydrocodone-acetaminophen]; and Nkda [no known drug allergy]    She  reports that she has been smoking Cigarettes.  She has a 17.50 pack-year smoking history. She has never used smokeless tobacco. She reports that she drinks alcohol. She reports that she does not use drugs.  Ready to quit: Not Answered  Counseling given: Not Answered      ROS   No chest pain, no shortness of breath.       Objective:     Physical Exam:  Blood pressure 122/82, pulse 80,  "temperature 37.4 °C (99.3 °F), height 1.727 m (5' 8\"), weight 88.9 kg (196 lb), SpO2 95 %, not currently breastfeeding. Body mass index is 29.8 kg/m².     Constitutional: Alert, obese but otherwise well-appearing, no distress.  Skin: No rashes in visible areas.  Eye: Conjunctiva clear, lids normal.  ENMT: Lips without lesions, good dentition, oropharynx clear.  Respiratory: Unlabored respiratory effort, lungs clear to auscultation, no wheezes, no rhonchi.  Cardiovascular: Normal S1, S2, no murmur, no edema.  Psych: Alert and oriented x3, normal affect and mood.      Assessment and Plan:     1. Irritability  2. Anxiety  New problems, improved with citalopram but patient concerned she may have adult ADHD. I am recommending that she proceed with psychiatric evaluation before starting any stimulant medication. Should continue her citalopram in the meantime. I am also concerned that untreated sleep apnea may be contributing to some of her brain fog and memory impairment, so I have also recommended follow-up for that with pulmonology.     3. Hormone replacement therapy (HRT)  4. Atrophic vaginitis  Chronic issue, doing well on Climara patch which was started by gynecology. Has been helping with her atrophic vaginitis. Smoking does put her at higher risk of complications. Cessation was encouraged today.    5. History of sleep apnea  Chronic issue per patient. Unable to find any records. I am recommending that she re-establish with pulmonology. Will need repeat sleep study evaluation/oxygen assessment to determine proper therapy.  - REFERRAL TO SLEEP STUDIES    6. Dyslipidemia  Chronic issue, relatively stable per review. Will plan on re-check of lipids and other screening labs in 6 months before next appointment.  - COMP METABOLIC PANEL; Future  - Lipid Profile; Future    7. Elevated hemoglobin (HCC)  Chronic issue per lab review. Suspect related to untreated sleep apnea. Patient will be following up with pulmonology " regarding this. Will repeat CBC with next set of lab work.  - CBC WITH DIFFERENTIAL; Future    8. Cigarette nicotine dependence without complication  Chronic issue, uncontrolled given that she continues to smoke. Cessation strongly encouraged today. Patient not currently interested in any prescription cessation aids.    9. Rash and nonspecific skin eruption  Chronic issue, now reasonably well-controlled with topical triamcinolone cream. Continue current management including regular dermatology follow-up.    10. Encounter for screening for malignant neoplasm of breast  - MA-SCREEN MAMMO W/CAD-BILAT; Future    Patient declines Tdap vaccine.    Discussion today about general wellness and lifestyle habits:    · Engage in regular physical activity and social activities.  · Prevent falls and reduce trip hazards; using ambulatory aides, hearing and vision testing if appropriate.  · Steps to improve urinary incontinence.  · Advanced care planning.    Follow-Up: Return in about 6 months (around 7/15/2019).      Jackeline Quinn P.A.-C.            PLEASE NOTE: This dictation was created using voice recognition software. I have made every reasonable attempt to correct obvious errors, but I expect that there are errors of grammar and possibly content that I did not discover before finalizing the note.

## 2019-01-15 NOTE — ASSESSMENT & PLAN NOTE
"Patient endorses previous sleep study \"years ago\" and was told sleep apnea was \"very mild.\" Previous PCP did overnight oximetry test a couple of years ago which was abnormally low. She states it was recommended that she start on supplemental O2, but was never notified that anything was ordered for her.  "

## 2019-01-21 ENCOUNTER — HOSPITAL ENCOUNTER (OUTPATIENT)
Dept: LAB | Facility: MEDICAL CENTER | Age: 54
End: 2019-01-21
Attending: INTERNAL MEDICINE
Payer: MEDICARE

## 2019-01-21 LAB
BASOPHILS # BLD AUTO: 1 % (ref 0–1.8)
BASOPHILS # BLD: 0.07 K/UL (ref 0–0.12)
CRP SERPL HS-MCNC: 0.18 MG/DL (ref 0–0.75)
EOSINOPHIL # BLD AUTO: 0.12 K/UL (ref 0–0.51)
EOSINOPHIL NFR BLD: 1.6 % (ref 0–6.9)
ERYTHROCYTE [DISTWIDTH] IN BLOOD BY AUTOMATED COUNT: 41.8 FL (ref 35.9–50)
ERYTHROCYTE [SEDIMENTATION RATE] IN BLOOD BY WESTERGREN METHOD: 8 MM/HOUR (ref 0–30)
HCT VFR BLD AUTO: 49.5 % (ref 37–47)
HGB BLD-MCNC: 16.1 G/DL (ref 12–16)
IMM GRANULOCYTES # BLD AUTO: 0.01 K/UL (ref 0–0.11)
IMM GRANULOCYTES NFR BLD AUTO: 0.1 % (ref 0–0.9)
LYMPHOCYTES # BLD AUTO: 2.92 K/UL (ref 1–4.8)
LYMPHOCYTES NFR BLD: 39.7 % (ref 22–41)
MCH RBC QN AUTO: 30 PG (ref 27–33)
MCHC RBC AUTO-ENTMCNC: 32.5 G/DL (ref 33.6–35)
MCV RBC AUTO: 92.2 FL (ref 81.4–97.8)
MONOCYTES # BLD AUTO: 0.36 K/UL (ref 0–0.85)
MONOCYTES NFR BLD AUTO: 4.9 % (ref 0–13.4)
NEUTROPHILS # BLD AUTO: 3.88 K/UL (ref 2–7.15)
NEUTROPHILS NFR BLD: 52.7 % (ref 44–72)
NRBC # BLD AUTO: 0 K/UL
NRBC BLD-RTO: 0 /100 WBC
PLATELET # BLD AUTO: 279 K/UL (ref 164–446)
PMV BLD AUTO: 11.7 FL (ref 9–12.9)
RBC # BLD AUTO: 5.37 M/UL (ref 4.2–5.4)
T4 FREE SERPL-MCNC: 0.76 NG/DL (ref 0.53–1.43)
TSH SERPL DL<=0.005 MIU/L-ACNC: 1.43 UIU/ML (ref 0.38–5.33)
WBC # BLD AUTO: 7.4 K/UL (ref 4.8–10.8)

## 2019-01-21 PROCEDURE — 86038 ANTINUCLEAR ANTIBODIES: CPT

## 2019-01-21 PROCEDURE — 84443 ASSAY THYROID STIM HORMONE: CPT

## 2019-01-21 PROCEDURE — 36415 COLL VENOUS BLD VENIPUNCTURE: CPT

## 2019-01-21 PROCEDURE — 84439 ASSAY OF FREE THYROXINE: CPT

## 2019-01-21 PROCEDURE — 85025 COMPLETE CBC W/AUTO DIFF WBC: CPT

## 2019-01-21 PROCEDURE — 85652 RBC SED RATE AUTOMATED: CPT

## 2019-01-21 PROCEDURE — 82785 ASSAY OF IGE: CPT

## 2019-01-21 PROCEDURE — 86140 C-REACTIVE PROTEIN: CPT

## 2019-01-22 ENCOUNTER — HOSPITAL ENCOUNTER (OUTPATIENT)
Dept: RADIOLOGY | Facility: MEDICAL CENTER | Age: 54
End: 2019-01-22
Attending: PHYSICIAN ASSISTANT
Payer: MEDICARE

## 2019-01-22 DIAGNOSIS — Z12.39 ENCOUNTER FOR SCREENING FOR MALIGNANT NEOPLASM OF BREAST: ICD-10-CM

## 2019-01-22 PROCEDURE — 77063 BREAST TOMOSYNTHESIS BI: CPT

## 2019-01-23 LAB
IGE SERPL-ACNC: 6 KU/L
NUCLEAR IGG SER QL IA: NORMAL

## 2019-01-25 ENCOUNTER — OFFICE VISIT (OUTPATIENT)
Dept: MEDICAL GROUP | Facility: PHYSICIAN GROUP | Age: 54
End: 2019-01-25
Payer: MEDICARE

## 2019-01-25 VITALS
HEIGHT: 68 IN | OXYGEN SATURATION: 96 % | HEART RATE: 64 BPM | BODY MASS INDEX: 28.79 KG/M2 | TEMPERATURE: 98.1 F | WEIGHT: 190 LBS | DIASTOLIC BLOOD PRESSURE: 68 MMHG | SYSTOLIC BLOOD PRESSURE: 124 MMHG

## 2019-01-25 DIAGNOSIS — Z23 NEED FOR VACCINATION: ICD-10-CM

## 2019-01-25 DIAGNOSIS — R22.1 LOCALIZED SWELLING, MASS AND LUMP, NECK: ICD-10-CM

## 2019-01-25 DIAGNOSIS — K02.9 DENTAL DECAY: ICD-10-CM

## 2019-01-25 PROCEDURE — 99214 OFFICE O/P EST MOD 30 MIN: CPT | Performed by: PHYSICIAN ASSISTANT

## 2019-01-25 RX ORDER — TRIAMCINOLONE ACETONIDE 1 MG/G
OINTMENT TOPICAL 2 TIMES DAILY
COMMUNITY

## 2019-01-25 NOTE — Clinical Note
Can't remember if this if 4th or 5th chart for the month. If I still need one more, please let me know. I've been out sick all week but could CC one of my earlier notes.  -Jackeline

## 2019-01-28 ENCOUNTER — HOSPITAL ENCOUNTER (OUTPATIENT)
Dept: LAB | Facility: MEDICAL CENTER | Age: 54
End: 2019-01-28
Attending: PHYSICIAN ASSISTANT
Payer: MEDICARE

## 2019-01-28 ENCOUNTER — HOSPITAL ENCOUNTER (OUTPATIENT)
Dept: RADIOLOGY | Facility: MEDICAL CENTER | Age: 54
End: 2019-01-28
Attending: PHYSICIAN ASSISTANT
Payer: MEDICARE

## 2019-01-28 DIAGNOSIS — K02.9 DENTAL DECAY: ICD-10-CM

## 2019-01-28 DIAGNOSIS — R22.1 LOCALIZED SWELLING, MASS AND LUMP, NECK: ICD-10-CM

## 2019-01-28 LAB
CALCIUM SERPL-MCNC: 10.1 MG/DL (ref 8.5–10.5)
PTH-INTACT SERPL-MCNC: 37.7 PG/ML (ref 14–72)

## 2019-01-28 PROCEDURE — 83970 ASSAY OF PARATHORMONE: CPT

## 2019-01-28 PROCEDURE — 76536 US EXAM OF HEAD AND NECK: CPT

## 2019-01-28 PROCEDURE — 36415 COLL VENOUS BLD VENIPUNCTURE: CPT

## 2019-01-30 ENCOUNTER — SUPERVISING PHYSICIAN REVIEW (OUTPATIENT)
Dept: MEDICAL GROUP | Facility: PHYSICIAN GROUP | Age: 54
End: 2019-01-30

## 2019-01-30 RX ORDER — MONTELUKAST SODIUM 10 MG/1
10 TABLET ORAL DAILY
COMMUNITY
Start: 2019-01-24

## 2019-01-30 NOTE — PROGRESS NOTES
"Subjective:   Jessica Davis is a 53 y.o. female here today for thyroid concern. Is an established patient of mine.    HPI:    Patient presents to the office today with concerns regarding her thyroid. She states that her dentist requested that she come in for an appointment with me. She states that she's been found to have rapid dental decay which her dentist is very concerned about. She states that he and one other dentist did a neck exam and told her that her thyroid felt asymmetric and that she should be seen and have labs done.    Patient herself has not noticed any changes in her neck. Specifically, has not noticed any swelling, lumps, or difficulty swallowing. She occasionally experiences what she describes as a \"cramp\" on the left side of her neck when yawning, but no other pain. She does not have any known history of thyroid disease. Is not on any thyroid medication. She did recently have thyroid labs done by her allergist.    Current medicines (including changes today)  Current Outpatient Prescriptions   Medication Sig Dispense Refill   • triamcinolone acetonide (KENALOG) 0.1 % Ointment Apply  to affected area(s) 2 times a day.     • citalopram (CELEXA) 20 MG Tab Take 1 Tab by mouth every day. 90 Tab 2   • triamcinolone acetonide (KENALOG) 0.1 % Cream Apply thin layer to affected area twice a day as needed for eczema, do not use longer than 14 days at a time. (Patient not taking: Reported on 1/25/2019) 60 g 1   • estradiol (CLIMARA) 0.05 MG/24HR PATCH WEEKLY Apply 1 Patch to skin as directed every 7 days. (Patient not taking: Reported on 1/25/2019) 4 Patch 4     No current facility-administered medications for this visit.      She  has a past medical history of Arthritis; Gynecological disorder; Hypertension; S/P endometrial ablation (3/2/2016); Sleep apnea; Snoring; and Thickened endometrium (3/28/2016).    ROS  As per HPI.       Objective:     Blood pressure 124/68, pulse 64, temperature 36.7 °C " "(98.1 °F), height 1.727 m (5' 8\"), weight 86.2 kg (190 lb), SpO2 96 %, not currently breastfeeding. Body mass index is 28.89 kg/m².     Physical Exam:  Constitutional: Alert, well-appearing, no distress.  Skin: Warm, dry, good turgor, no rashes in visible areas.  Eye: Pupils are equal and round, conjunctiva clear, lids normal.  ENMT: Lips without lesions, moist mucus membranes.  Neck: No masses. No submandibular or cervical lymphadenopathy. No palpable thyromegaly, masses, or tenderness.   Respiratory: Unlabored respiratory effort, lungs clear to auscultation, no wheezes, no rhonchi.  Cardiovascular: Normal S1, S2, no murmur.      Assessment and Plan:   The following treatment plan was discussed    1. Dental decay  New problem per patient's dentist. Unclear what exactly he is concerned about, but will proceed with thyroid US. Per review, recent thyroid labs were normal. Will obtain PTH to rule out hyperparathyroidism.  - US-SOFT TISSUES OF HEAD - NECK; Future  - PTH WITH CALCIUM; Future    2. Localized swelling, mass and lump, neck  New problem per patient's dentist. Exam today is normal. Evaluation as per above.  - US-SOFT TISSUES OF HEAD - NECK; Future  - PTH WITH CALCIUM; Future      Followup: Return for re-eval pending results.    Jackeline Quinn P.A.-C.             "

## 2019-01-30 NOTE — PROGRESS NOTES
I have reviewed and agree with history, assessment and plan for office encounter on 1/25/19 with Jackeline Quinn PA-C.  Face to Face encounter/direct observation: no  Suggestions as follows: No change to plan or follow up  -Laverne Moss M.D.

## 2019-03-12 ENCOUNTER — OFFICE VISIT (OUTPATIENT)
Dept: BEHAVIORAL HEALTH | Facility: CLINIC | Age: 54
End: 2019-03-12
Payer: MEDICARE

## 2019-03-12 VITALS
DIASTOLIC BLOOD PRESSURE: 90 MMHG | WEIGHT: 198 LBS | BODY MASS INDEX: 30.01 KG/M2 | HEART RATE: 64 BPM | HEIGHT: 68 IN | SYSTOLIC BLOOD PRESSURE: 159 MMHG

## 2019-03-12 DIAGNOSIS — R41.840 INATTENTION: ICD-10-CM

## 2019-03-12 PROCEDURE — 99205 OFFICE O/P NEW HI 60 MIN: CPT | Performed by: NURSE PRACTITIONER

## 2019-03-12 ASSESSMENT — PATIENT HEALTH QUESTIONNAIRE - PHQ9
SUM OF ALL RESPONSES TO PHQ QUESTIONS 1-9: 9
CLINICAL INTERPRETATION OF PHQ2 SCORE: 2
5. POOR APPETITE OR OVEREATING: 1 - SEVERAL DAYS

## 2019-03-12 NOTE — PROGRESS NOTES
"INITIAL PSYCHIATRY EVALUATION  Chief Complaint   Patient presents with   • Establish Care   • Initial  Evaluation   • ADHD     \"My daughter thinks I have ADHD\"    History Of Present Illness:  Jessica Davis is a 53 y.o. female with history of anxiety referred by No ref. provider found.  Primary presenting issue(s) today include symptoms of ADHD.    The patient states her daughter was diagnosed with ADHD in September of 2018.  At that time, her daughter pointed out several symptoms to her that she has always struggled with, but felt were normal. The patient reports symptoms consistent with ADHD including failing to give close attention to details, making careless mistakes, difficulty sustaining attention, not following through on instructions, failing to finish duties, difficulty organizing tasks, avoiding tasks that require sustained mental effort, losing things necessary for tasks, being distracted by extraneous stimuli, becoming forgetful in daily activities, constantly fidgeting, feeling like she is \"driven by a motor\", and difficulty waiting their turn.  She feels like these symptoms have always been there and are present and has affected both her work and school performance.  She notes she was a good student in college, but felt like she had to learn little tricks to remember things and it took her longer to get things. She states she often was getting written up at work for careless mistakes. She notes she has previously had to write down conversations at work or she'd forget them.  She has recently saw what a difference ADHD medications have made for her daughter. She aspires to join the workforce again and feels like being treated for these symptoms may help her be successful in her future career.  Her daughter is on Adderall and it has been \"a miracle drug\" for her, requests to try this medication if at all possible.    She notes she does not feel depressed, hopeless or helpless and denies anhedonia. " "Her sleep is \"pretty good,\" sleeping 7-8hrs a day.  However, she does not ever feel rested. Her energy level is low. She feels like she has motivation, but cannot ever get anywhere with it due to her ADHD symptoms.  Her appetite has increased since stopping smoking 15 days ago, but it is normally under control. Her PHQ9 score today is 9.  She denies suicidal thoughts, passive or active.    She does struggle with anxiety and notes she always experiences muscle tension due to this. She is frequently worried, but is unsure what she is worried about.  She always feels restless and on edge and does not get easily fatigued. She notes she has had 2 panic attacks in her life, but does not experience them regularly as her last one was approximately 5-7 days ago.  She notes she used to experience irritability, but not since starting Celexa after a back surgery in 2000. She notes her irritability comes back whenever she stops taking this medication and feels she may need to continue it at this time.     She denies symptoms of hypomania or nick, pressured speech, racing thoughts, and impulsive spending, etc.  She denies symptoms of PTSD including flashbacks and nightmares. Denies a symptom cluster consistent with an eating disorder or OCD. Denies auditory and visual hallucinations. Denies paranoia.  She denies homicidal ideations.    Current psychiatric medications   Celexa 20mg po q day - has been on this medication since 2002    Previous medication trials:   Prozac, Valium after a panic attack, a few others cannot remember their names    Past Psychiatric History:   Prior diagnosis: Denies.  Past prescribing provider(s): Denies  Prior psychiatric hospitalization: Denies  Hx of self-harm/suicide attempt: Denies  Hx of violence: Denies  Hx of psychotherapy: Marital counseling before when her children were young.    Allergies:  Percocet [apap-fd&c red #40 [place]-oxycodone]; Vicodin [hydrocodone-acetaminophen]; and Nkda [no " known drug allergy]    Family History:   Family History   Problem Relation Age of Onset   • Dementia Mother    • Alcohol abuse Mother    • Alzheimer's Disease Father    • Cancer Father         prostate, stage four pelvic    • ADD / ADHD Father    • ADD / ADHD Daughter        Past Medical/Surgical History:  Past Medical History:   Diagnosis Date   • Arthritis     back   • Gynecological disorder     had hysterectomy   • Hypertension    • S/P endometrial ablation 3/2/2016    2005 ablation done. EMB in 2009- benign proliferative.    • Sleep apnea     mild- no cpap   • Snoring    • Thickened endometrium 3/28/2016     Past Surgical History:   Procedure Laterality Date   • CYSTOSCOPY N/A 5/27/2016    Procedure: CYSTOSCOPY Possible;  Surgeon: Amanda Ramey M.D.;  Location: SURGERY SAME DAY [facility];  Service:    • VAGINAL HYSTERECTOMY SCOPE TOTAL N/A 5/27/2016    Procedure: ATTEMPTED VAGINAL HYSTERECTOMY SCOPE TOTAL WITH BILATERAL SALPINGECTOMY converted to laparotomy;  Surgeon: Amanda Ramey M.D.;  Location: SURGERY SAME DAY [facility];  Service:    • ABDOMINAL HYSTERECTOMY TOTAL N/A 5/27/2016    Procedure: ABDOMINAL HYSTERECTOMY TOTAL WITH BILATERAL SALPINGectomy, Left OOPHERECTOMY. dense pelvic adhesiolysis.   partial omentectomy. ;  Surgeon: Amanda Ramey M.D.;  Location: SURGERY SAME DAY [facility];  Service:    • HYSTEROSCOPY WITH VIDEO DIAGNOSTIC N/A 3/28/2016    Procedure: HYSTEROSCOPY WITH VIDEO DIAGNOSTIC AND VULVAR BIOPSY;  Surgeon: Amanda Ramey M.D.;  Location: SURGERY SAME DAY [facility];  Service:    • DILATION AND CURETTAGE N/A 3/28/2016    Procedure: DILATION AND CURETTAGE;  Surgeon: Amanda Ramey M.D.;  Location: SURGERY SAME DAY [facility];  Service:    • CHOLECYSTECTOMY     • GYN SURGERY      ablation   • OTHER      left breast lumpectomy   • OTHER NEUROLOGICAL SURG      back surgery x2     Medications:  Current Outpatient Prescriptions   Medication Sig Dispense  "Refill   • montelukast (SINGULAIR) 10 MG Tab Take 10 mg by mouth every day.     • citalopram (CELEXA) 20 MG Tab Take 1 Tab by mouth every day. 90 Tab 2   • estradiol (CLIMARA) 0.05 MG/24HR PATCH WEEKLY Apply 1 Patch to skin as directed every 7 days. 4 Patch 4   • triamcinolone acetonide (KENALOG) 0.1 % Ointment Apply  to affected area(s) 2 times a day.     • triamcinolone acetonide (KENALOG) 0.1 % Cream Apply thin layer to affected area twice a day as needed for eczema, do not use longer than 14 days at a time. (Patient not taking: Reported on 1/25/2019) 60 g 1     No current facility-administered medications for this visit.      Substance Use/Addiction History:  Amphetamine:  Amphetamine frequency: Past rare use    Cannibis:  Cannabis frequency: Past rare use    Cocaine:  Cocaine frequency: Past rare use    Ecstasy:  Ecstasy frequency: Never used    Hallucinogen:  Hallucinogen frequency: Never used    Inhalant:   Inhalant frequency: Never used    Opiate:  Opiate frequency: Never used  Cannabis frequency: Past rare use    Other:  Other drug frequency: Never used    Sedative:   Sedative frequency: Never used    Nicotine:  Smoker smoker, quit 15 days ago    Alcohol:  Rare    History of inpatient/outpatient withdrawal or rehab treatment:   Denies.    Caffeine:   4 cups of coffee/day    Social History:  Childhood: Grew up in [place], [place], has an older brother. Childhood was \"good ... Normal.\"  Did not get along with father.    Education: Completed bachelor's degree in criminal justice in 2005    Employment: Semi-retired.  Worked for [employer], was laid off when this happened. Is currently an .    Relationship/Children: x2 marriage,  29 years currently.  Has 3 grown kids, all in the area.    Current living situation: Lives with  and 95-year-old mother, care taker of her.    Hobbies: previously into dirt bike racing, goes camping with family a lot, spending time outdoors with " "children, go shooting    Support system: \"I don't think I have one.\"    History of emotional/physical/sexual abuse: Denies.    History or current legal issues: Denies.    Access to firearms:  Yes, locked up.    Depression Screening (PHQ-9 Score) Today: 9    Interpretation of PHQ-9 Total Score   Score Severity   1-4 No Depression   5-9 Mild Depression   10-14 Moderate Depression   15-19 Moderately Severe Depression   20-27 Severe Depression    Physical Examination:  /90 (BP Location: Right arm, Patient Position: Sitting)   Pulse 64   Ht 1.727 m (5' 8\")   Wt 89.8 kg (198 lb)   BMI 30.11 kg/m²     Review of Symptoms:  Constitutional: denies recent chills, fevers.     Neuro: denies recent weakness, numbness, or headaches.   HEENT: denies recent nasal congestion or sore throat.  Cardiovascular: denies recent chest pain, palpitations, or extremity edema. HX of dyslipidemia.  Respiratory: denies recent shortness of breath, dyspnea, or cough.  Stopped smoking 15 days ago. Hx of sleep apnea  GI: denies recent nausea, vomiting, or diarrhea.  : denies recent urinary urgency frequency or urgency.  Hx of HRT  Musculoskeletal: age-appropriate gait and station, good balance. No abnormal movements noted.   Skin: recent eczema   Endocrine: denies recent cold and heat intolerance, denies excessive thirst.   Hematologic: denies recent abnormal bleeding and bruising easily.  Psychiatric: Positive for symptoms of ADHD, mild depression/anxiety     Mental Status Examination:   Appearance: 53 y.o.  female, dressed in casual attire, grooming and hygiene appropriate, appropriate to weather, neat,  Behavior: calm and cooperative, good eye contact, no psychomotor agitation or retardation noted  Participation: active verbal participation, engaged  Speech: spontaneous, regular rate, rhythm, and volume noted.  Language appropriate.  Mood: \"Nervous.\"  Affect: anxious and mood congruent  Orientation: alert and oriented to " "person, place, situation, and time.  Attention/concentration: Intact. Able to spell the word “world” forwards and backwards without difficulty.   Associations/Abstract reasoning:Adequate. Identifies similarities between a car and a submarine as \"both made of metal\" and similarities between a watch and a ruler as \"both have numbers\".  Interprets meaning of the proverb “Don’t  a book by its cover” by \"just because you think one way doesn't mean it is so.\"  Thought Process: linear, logical, and goal-directed  Thought Content: denies passive/active suicidal or homicidal ideations, intent, or plan, denies homicidal ideations  Perception: denies auditory or visual hallucinations, no delusions noted  Fund of knowledge and vocabulary: Adequate.  Memory: Recent memory limited, Remote memory adequate, can only remember 2/3 items after 3 minutes.  Insight: Fair.  Judgment: Fair.    Medical Records/Labs/Diagnostic Tests Reviewed:   records reviewed, recent relevant provider encounters reviewed, recent relevant labs in record reviewed     Strengths/Assets:  Patient strengths identified included self-awareness, family support, social skills, hopeful for future    If the patient could have any three wishes, they would wish for:  1. Health  2. Happiness  3. Longevity    Impression:  1. R/o ADHD, predominantly inattentive type    Plan:  1. Medication options, alternatives (including no medications) and medication risks/benefits/side effects were discussed in detail.   2. Continue Celexa 20mg po q Day for depressive and anxiety symptoms.  She has refills of this currently.  3. After much discussion about the treatment of ADHD, she agrees to have psychological testing to confirm or deny that she has this issue.  Will refer to psychological testing in this clinic.  4. The patient was advised to call, message provider on [employer], or come in to the clinic if symptoms worsen or if any future questions/issues regarding their " medications arise; the patient verbalized understanding and agreement.    5. The patient was educated to call 911, call the suicide hotline, or go to local ER if having thoughts of suicide or homicide; verbalized understanding.    Return to clinic in after psychological testing or sooner if symptoms worsen.    The proposed treatment plan was discussed with the patient who was provided the opportunity to ask questions and make suggestions regarding alternative treatment. Patient verbalized understanding and expressed agreement with the plan.     Total face-to-face time: 60 minutes with more than 50% of face-to-face time spent in counseling and coordinating care as stated in the above plan.     Thank you for allowing me to participate in the care of this patient.    LAUREN Canseco.P.R.N.  03/12/19    This note was created using voice recognition software (Dragon). The accuracy of the dictation is limited by the abilities of the software. I have reviewed the note prior to signing, however some errors in grammar and context are still possible. If you have any questions related to this note please do not hesitate to contact our office.

## 2019-05-20 ENCOUNTER — OFFICE VISIT (OUTPATIENT)
Dept: BEHAVIORAL HEALTH | Facility: CLINIC | Age: 54
End: 2019-05-20
Payer: MEDICARE

## 2019-05-20 DIAGNOSIS — Z13.39 ADHD (ATTENTION DEFICIT HYPERACTIVITY DISORDER) EVALUATION: ICD-10-CM

## 2019-05-20 DIAGNOSIS — F90.0 ADHD (ATTENTION DEFICIT HYPERACTIVITY DISORDER), INATTENTIVE TYPE: ICD-10-CM

## 2019-05-20 DIAGNOSIS — R45.4 IRRITABILITY: ICD-10-CM

## 2019-05-20 PROCEDURE — 96131 PSYCL TST EVAL PHYS/QHP EA: CPT | Performed by: PSYCHOLOGIST

## 2019-05-20 PROCEDURE — 96130 PSYCL TST EVAL PHYS/QHP 1ST: CPT | Performed by: PSYCHOLOGIST

## 2019-05-20 NOTE — BH THERAPY
[facility]  Therapy Progress Note    Patient Name: Jessica Davis  Patient MRN: [id number]  Today's Date: 5/20/2019     Type of session: Psychological Testing  Length of session: 120 minutes  Persons in attendance:Patient    Subjective/New Info: The patient ID/Chief Complaint:  The patient is a 53 year old female, , .  The patient was referred by Mr. Kimball psychiatric nurse practitioner and voluntarily presented for an individual intake to address historical difficulties with attentional problems and irritability symptoms that have in the past been diagnosed as depression and anxiety.  Reviewed limits of confidentiality and [facility] policies; patient expressed understanding and agreed to voluntarily proceed with evaluation and treatment.    Review of Symptoms:  Depression and other mood disorders   Increase in sleep No    Decrease in sleep No    Interest (anhedonia) No     Guilt feeling Yes   Worthless No   Hopelessness No    Regret Not Reported/Not Observed     Energy deficit No     Concentration deficit No      Appetite deficit No   Psychomotor   Retardation No     Agitation No      Suicidality No   Distractibility No    Indiscretions No    Grandiosity No    Flight of ideas No    Activity level Increase No   Sleep deficit (decreased need for 3 days) No   Talkativeness No      Anxiety Symptoms   Panic Attacks No     Breathing Difficulties No   Shallow Breathing No   Chest Pain No   Chest Pressure/ Chest Tightness No   Heart Pounding/Heart Palpitations No   Hyperventilation No   Sweating No   Muscle Tension/ Sore Muscles No   Concentration Problems No   Depersonalization/ Derealization No   Difficulty Speaking No   Digestion Issues No   Dizziness No   Headaches No   Lightheadedness No   Fear No   Feeling Ill No   Worrying No   Nausea No   Feeling Overwhelmed No   Feeling Shaky No   Low Energy No   Shaking No     Restlessness No     Easily fatigued No        Social Anxiety No       PTSD No          Sleep Disturbance Denies    Difficulty falling asleep Yes    Difficulty staying asleep Yes    Restless Yes    Unsatisfying sleep Yes    Nightmares No    Sleepwalking No    Restless legs No   Sleep Apnea Yes diagnosed with mild sleep apnea but not currently being treated   Substance abuse Denies   Obsessive Symptoms No   Compulsive Symptoms No   Eating Disorder No    Body Dysmorphic Symptoms No   Somatic Symptoms No   Illness Anxiety No   Neurocognitive Disorder  Disturbance in attention No   Disturbance developed Not Reported/ Observed   Additional Disturbance None   Psychotic disorders Not Reported/ Observed    Delusions No   Hallucination No   Disorganized Speech No   Grossly Disorganized Behavior No   Negative Symptoms No     Inattentive presentation:  Past   Fails to give close attention to details or makes careless mistakes Mild   Has difficulty sustaining attention Moderate   Does not appear to listen Moderate   Struggles to follow through on instructions Moderate   Has difficulty with organization Moderate   Avoids or dislikes tasks requiring a lot of thinking Mild    Loses things Moderate   Is easily distracted Moderate   Is forgetful in daily activities Not Observed     Hyperactive-impulsive presentation: Denied   Fidgets with hands or feet or squirms in chair Mild   Has difficulty remaining seated Choose an item.   Runs about or climbs excessively in children; extreme restlessness in adults Not Observed   Difficulty engaging in activities quietly Not Observed  Acts always on the go Not Observed   Talks excessively Not Observed   Blurts out answers before questions have been completed Not Observed   Difficulty waiting or taking turns Not Observed   Interrupts or intrudes upon others Mild       Relevant History:  The patient states her daughter was diagnosed with ADHD in September of 2018. At that time, her daughter pointed out several symptoms to her that she has  "always struggled with, but felt were normal. The patient reports symptoms consistent with ADHD including failing to give close attention to details, making careless mistakes, difficulty sustaining attention, not following through on instructions, failing to finish duties, difficulty organizing tasks, avoiding tasks that require sustained mental effort, losing things necessary for tasks, being distracted by extraneous stimuli, becoming forgetful in daily activities, constantly fidgeting, feeling like she is \"driven by a motor\", and difficulty waiting their turn. She feels like these symptoms have always been there and are present and has affected both her work and school performance. She notes she was a good student in college, but felt like she had to learn little tricks to remember things and it took her longer to get things. She states she often was getting written up at work for careless mistakes. She notes she has previously had to write down conversations at work or she'd forget them. She has recently saw what a difference ADHD medications have made for her daughter. She aspires to join the workforce again and feels like being treated for these symptoms may help her be successful in her future career. Her daughter is on Adderall and it has been \"a miracle drug\" for her, requests to try this medication if at all possible.     She notes she does not feel depressed, hopeless or helpless and denies anhedonia. Her sleep is \"pretty good,\" sleeping 7-8hrs a day. However, she does not ever feel rested. Her energy level is low. She feels like she has motivation, but cannot ever get anywhere with it due to her ADHD symptoms. Her appetite has increased since stopping smoking 15 days ago, but it is normally under control. Her PHQ9 score today is 9. She denies suicidal thoughts, passive or active.     She does struggle with anxiety and notes she always experiences muscle tension due to this. She is frequently worried, but " is unsure what she is worried about. She always feels restless and on edge and does not get easily fatigued. She notes she has had 2 panic attacks in her life, but does not experience them regularly as her last one was approximately 5-7 days ago. She notes she used to experience irritability, but not since starting Celexa after a back surgery in 2000. She notes her irritability comes back whenever she stops taking this medication and feels she may need to continue it at this time.      She denies symptoms of hypomania or nick, pressured speech, racing thoughts, and impulsive spending, etc. She denies symptoms of PTSD including flashbacks and nightmares. Denies a symptom cluster consistent with an eating disorder or OCD. Denies auditory and visual hallucinations. Denies paranoia. She denies homicidal ideations.     Current psychiatric medications   Celexa 20mg po q day - has been on this medication since 2002     Previous medication trials:   Prozac, Valium after a panic attack, a few others cannot remember their names     Past Psychiatric History:   Prior diagnosis: Denies.  Past prescribing provider(s): Denies  Prior psychiatric hospitalization: Denies  Hx of self-harm/suicide attempt: Denies  Hx of violence: Denies  Hx of psychotherapy: Marital counseling before when her children were young.     Allergies:  Percocet [apap-fd&c red #40 [place]-oxycodone]; Vicodin [hydrocodone-acetaminophen]; and Nkda [no known drug allergy]    Psychological Testing:    • Rico’ Continuous Performance Test II (CPT-II)  • Repeatable Battery for the Assessment of Neuropsychological Status (RBANS)      The patient completed the Rico’ Continuous Performance Test II the instrument is useful in diagnosing ADHD is being considered.  Discrimination function indicates that the results better match a clinical than nonclinical profile the confidence index computed can be readily described in the following way the chances are 68.62 out of  100 that a chance of attention problems exist.  The ADHD assessment: Poor CPT performance; potential attention problems which should be correlated with functional difficulties related to attention.  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  She describes several compensation strategies that she is used over the course of her life in order to minimize the negative impact of her attentional difficulties.    The Repeatable Battery for the Assessment of Neuropsychological Status (RBANS) is a brief standardized screening tool to measure neuropsychological status in adults aged 20 to 89.  The test measures the following components:    Immediate memory: This index is a measure of initial encoding and learning of complex and simple verbal information. Low scores indicate difficulties with verbal learning.  The patient was in the Average range indicating no difficulty with verbal learning.    Visuospatial/constructional: This index is a measure of basic visuospatial perception and the ability to copy a design from a model. Low scores indicate difficulties with processing and using visuospatial information.  The patient was in the High Average range.    Attention: This index is a measure of simple auditory registration and visual scanning and processing speed. Low scores indicate difficulties with basic attention processes and speed of information processing.  Was in the Low Average range suggesting that the patient has some difficulty with simple auditory registration and visual scanning.    Language:  This index is a measure of expressive language functioning. Low scores indicate difficulties with fluent use of language, including expressive and receptive language.  The patient's performance on the language subtest suggest Average range performance specifically related to the fluent use of language.    Delayed Memory: This index is a measure of delayed recall and recognition for verbal and visual information. Low scores indicate difficulties with recognition and retrieval of information from long-term memory stores.  The patient demonstrated Average performance indicating without difficulty with recognizing in retrieving information from her long-term memory stores.        Objective/Observations:    Patient did not present in acute distress. Patient was appropriately groomed. Patient was alert and oriented x4. Eye contact was appropriate. No abnormalities in attention or concentration were noted. No abnormalities of movement present; psychomotor activity was normal. Speech was fluent and regular in rhythm, rate, volume, and tone. Thought processes linear, logical and goal directed. There was no evidence of thought disorder. No auditory or visual hallucinations. Long and short term memory appeared to be intact. Insight, judgment, and impulse control were deemed to be good.  Reported mood was “euthymic.” Affect was full-ranging and appropriate to thought content and conversation.  Patient denied past and current suicidal and homicidal ideation in plan, intent, and preparatory behavior.      Diagnoses:   1. ADHD (attention deficit hyperactivity disorder), inattentive type    2. Irritability    3. ADHD (attention deficit hyperactivity disorder) evaluation         The patient denied any suicide-related ideation and/or behaviors and intent/plan, denied thoughts about death and dying both in session and during the period since last appointment, or past 2 weeks.    Risk Level: Not Currently at Clinically Significant  Risk  Hospitalization is not deemed necessary at this time as the patient does not present a clear or imminent danger to self or others. No indication for pursuing higher level of care. Outpatient management is currently most appropriate and least restrictive level of care.    Current risk:   SUICIDE: Low   Homicide: Not applicable   Self-harm: Not applicable   Relapse: Not applicable   Other:    Safety Plan reviewed? No   If evidence of imminent risk is present, intervention/plan:         Goal(s)/Objective(s) addressed:     To complete a psychological evaluation for diagnostic clarity specifically related to whether or not the patient is experiencing difficulty with anxiety, depression, or attention deficit hyperactive disorder or some combination.     Plan:      1) The patient will return to the clinic to discuss the results with the psychiatric nurse practitioner and to consider whether or not pharmacological therapy would be appropriate for a diagnosis of attention deficit hyperactive disorder.  2) Referrals/Consults:  N/A  3) Barriers to Learning:  No  4) Readiness to Learn:  Yes  5) Cultural Concerns:  No  6) Patient voiced understanding of, and agreement with, plan and goals as annotated above.  7) Declare these services are medically necessary and appropriate to the patient’s diagnosis and needs  8) The point of contact at the [facility] regarding this evaluation is Dr. Chen, Psychologist.    Isaias Chen III, Ed.D.  5/20/2019

## 2019-05-30 NOTE — PROGRESS NOTES
"PSYCHIATRY FOLLOW-UP NOTE    Chief Complaint:    \"I got the ADHD test done.\"    History Of Present Illness:  Jessica Davis is a 53 y.o. female with depression/anxiety comes in today for follow up, was last seen in this office by this provider on 3.12.19.    The patient received formal ADHD testing since her last visit. Per testing psychologist's notes: \"The patient completed the Rico’ Continuous Performance Test II the instrument is useful in diagnosing ADHD is being considered.  Discrimination function indicates that the results better match a clinical than nonclinical profile the confidence index computed can be readily described in the following way the chances are 68.62 out of 100 that a chance of attention problems exist.\"      The patient continues to report symptoms consistent with ADHD, as she did last visit, including failing to give close attention to details, making careless mistakes, difficulty sustaining attention, not following through on instructions, failing to finish duties, difficulty organizing tasks, avoiding tasks that require sustained mental effort, losing things necessary for tasks, being distracted by extraneous stimuli, becoming forgetful in daily activities, constantly fidgeting, feeling like she is \"driven by a motor\", and difficulty waiting their turn. She again notes her daughter is on Adderall and it has been \"a miracle drug\" for her. She requests to try this medication today if at all possible.       She does not feel depressed lately.  Her sleep has been poor, as it often fluctuates.  She has been dieting with her , but her appetite remains stable.  She has stayed strong in stopping smoking and has gained a few pounds due to this. She continues to feel tense with some irritability, but denies panic attacks.  She denies suicidal thoughts, passive or active.  She continues to take the Celexa every day and is hesitant to stop as she becomes very irritable when she stops. " The patient denies symptoms of hypomania, psychosis or homicidal ideations.      Current psychiatric medications   Celexa 20mg po q day     Previous medication trials:   Prozac, Valium after a panic attack, a few others cannot remember their names     Past Psychiatric History:   Prior diagnosis: Denies.  Past prescribing provider(s): Denies  Prior psychiatric hospitalization: Denies  Hx of self-harm/suicide attempt: Denies  Hx of violence: Denies  Hx of psychotherapy: Marital counseling before when her children were young.    Social History (changes since last visit):    29 years currently.  Has 3 grown kids, all in the area. Lives with  and 95-year-old mother and is the care taker of her. Not working currently because of this.  Just bought a travel trailer, planning a camping trip with her grandson in 2 weeks.     Substance Use:  Alcohol: rare   Nicotine: quit in February.  Illicit drugs: Denies.   Caffeine: 4 cups of coffee/day    Medications:  Current Outpatient Prescriptions   Medication Sig Dispense Refill   • montelukast (SINGULAIR) 10 MG Tab Take 10 mg by mouth every day.     • triamcinolone acetonide (KENALOG) 0.1 % Ointment Apply  to affected area(s) 2 times a day.     • citalopram (CELEXA) 20 MG Tab Take 1 Tab by mouth every day. 90 Tab 2   • triamcinolone acetonide (KENALOG) 0.1 % Cream Apply thin layer to affected area twice a day as needed for eczema, do not use longer than 14 days at a time. (Patient not taking: Reported on 1/25/2019) 60 g 1   • estradiol (CLIMARA) 0.05 MG/24HR PATCH WEEKLY Apply 1 Patch to skin as directed every 7 days. 4 Patch 4     No current facility-administered medications for this visit.      Depression Screening (PHQ-9 Score):  Depression Screen (PHQ-2/PHQ-9) 6/14/2018 1/15/2019 3/12/2019   PHQ-2 Total Score 0 0 2   PHQ-9 Total Score - - 9     Interpretation of PHQ-9 Total Score   Score Severity   1-4 No Depression   5-9 Mild Depression   10-14 Moderate Depression  "  15-19 Moderately Severe Depression   20-27 Severe Depression    Physical Examination:  147/98, 68, 16, 205lbs, 5'8\"     Review of Symptoms:  Constitutional: denies recent chills, fevers.     Neuro: denies recent weakness, numbness, or headaches.   HEENT: denies recent nasal congestion or sore throat.  Cardiovascular: Hx of dyslipidemia.  Respiratory: denies recent shortness of breath, dyspnea, or cough.  Former smoker.Hx of sleep apnea.  GI: denies recent nausea, vomiting, or diarrhea.  : denies recent urinary urgency frequency or urgency.  Hx of HRT  Musculoskeletal: age-appropriate gait and station, good balance. No abnormal movements noted.   Skin: recent eczema   Endocrine: denies recent cold and heat intolerance, denies excessive thirst.   Hematologic: denies recent abnormal bleeding and bruising easily.  Psychiatric: Positive for symptoms of ADHD, mild depression/anxiety      Mental Status Examination:   Appearance: 53 y.o.  female, dressed in casual attire, long gray hair, nose ring, grooming and hygiene appropriate, appropriate to weather, neat,  Behavior: calm and cooperative, good eye contact, no psychomotor agitation or retardation noted  Participation: active verbal participation, engaged  Speech: spontaneous, regular rate, rhythm, and volume noted.  Language appropriate.  Mood: \"okay.\"  Affect: calm and mood congruent  Orientation: alert and oriented to person, place, situation, and time.  Attention/concentration: Intact.   Thought Process: linear, logical, and goal-directed  Thought Content: denies passive/active suicidal or homicidal ideations, intent, or plan, denies homicidal ideations  Perception: denies auditory or visual hallucinations, no delusions noted  Fund of knowledge and vocabulary: Adequate.  Memory: Recent memory limited, Remote memory adequate  Judgment: Fair.     Medical Records/Labs/Diagnostic Tests Reviewed:   records reviewed, recent relevant provider encounters " reviewed, recent relevant labs in record reviewed      Strengths/Assets:  Patient strengths identified included self-awareness, family support, social skills, hopeful for future    Impression:  ADHD, predominantly inattentive type  Irritablity     Plan:  1. Medication options, alternatives (including no medications) and medication risks/benefits/side effects were discussed in detail.   2. Continue Celexa 20mg po q Day for continued anxious/irritable symptoms.    3. Start Adderall 5mg po BID, one q am and one q noon for continued ADHD symptoms.  Common side effects of stimulants including anxiety, headache, loss of appetite, trouble sleeping, weight loss, HTN, exacerbation of tics, overstimulation, tremor, and dizziness were explained to patient, verbalized understanding.  4. The patient was advised to call, message provider on [facility], or come in to the clinic if symptoms worsen or if any future questions/issues regarding their medications arise; the patient verbalized understanding and agreement.    5. The patient was educated to call 911, call the suicide hotline, or go to local ER if having thoughts of suicide or homicide; verbalized understanding.    Return to clinic in 4 weeks or sooner if symptoms worsen    The proposed treatment plan was discussed with the patient who was provided the opportunity to ask questions and make suggestions regarding alternative treatment. Patient verbalized understanding and expressed agreement with the plan.     Total face-to-face time: 25 minutes with more than 50% of face-to-face time spent in counseling and coordinating care as stated in the above plan.     Adalberto Kimball A.P.R.N.  05/29/19    This note was created using voice recognition software (Dragon). The accuracy of the dictation is limited by the abilities of the software. I have reviewed the note prior to signing, however some errors in grammar and context are still possible. If you have any questions related to this  note please do not hesitate to contact our office.

## 2019-06-04 ENCOUNTER — OFFICE VISIT (OUTPATIENT)
Dept: BEHAVIORAL HEALTH | Facility: CLINIC | Age: 54
End: 2019-06-04
Payer: MEDICARE

## 2019-06-04 VITALS
RESPIRATION RATE: 16 BRPM | DIASTOLIC BLOOD PRESSURE: 98 MMHG | HEART RATE: 68 BPM | HEIGHT: 68 IN | SYSTOLIC BLOOD PRESSURE: 147 MMHG | BODY MASS INDEX: 31.07 KG/M2 | WEIGHT: 205 LBS

## 2019-06-04 DIAGNOSIS — R45.4 IRRITABILITY: ICD-10-CM

## 2019-06-04 DIAGNOSIS — F90.0 ADHD (ATTENTION DEFICIT HYPERACTIVITY DISORDER), INATTENTIVE TYPE: ICD-10-CM

## 2019-06-04 PROCEDURE — 99214 OFFICE O/P EST MOD 30 MIN: CPT | Performed by: NURSE PRACTITIONER

## 2019-06-04 RX ORDER — DEXTROAMPHETAMINE SACCHARATE, AMPHETAMINE ASPARTATE, DEXTROAMPHETAMINE SULFATE AND AMPHETAMINE SULFATE 1.25; 1.25; 1.25; 1.25 MG/1; MG/1; MG/1; MG/1
5 TABLET ORAL 2 TIMES DAILY
Qty: 60 TAB | Refills: 0 | Status: SHIPPED | OUTPATIENT
Start: 2019-06-04 | End: 2019-07-02

## 2019-06-12 ENCOUNTER — GYNECOLOGY VISIT (OUTPATIENT)
Dept: OBGYN | Facility: CLINIC | Age: 54
End: 2019-06-12
Payer: MEDICARE

## 2019-06-12 ENCOUNTER — HOSPITAL ENCOUNTER (OUTPATIENT)
Facility: MEDICAL CENTER | Age: 54
End: 2019-06-12
Attending: OBSTETRICS & GYNECOLOGY
Payer: MEDICARE

## 2019-06-12 VITALS
HEIGHT: 68 IN | WEIGHT: 206 LBS | DIASTOLIC BLOOD PRESSURE: 96 MMHG | BODY MASS INDEX: 31.22 KG/M2 | SYSTOLIC BLOOD PRESSURE: 132 MMHG

## 2019-06-12 DIAGNOSIS — L90.0 LICHEN SCLEROSUS: ICD-10-CM

## 2019-06-12 DIAGNOSIS — N89.8 VAGINAL ITCHING: ICD-10-CM

## 2019-06-12 DIAGNOSIS — R10.2 PELVIC PAIN: ICD-10-CM

## 2019-06-12 LAB
CANDIDA DNA VAG QL PROBE+SIG AMP: NEGATIVE
G VAGINALIS DNA VAG QL PROBE+SIG AMP: NEGATIVE
T VAGINALIS DNA VAG QL PROBE+SIG AMP: NEGATIVE

## 2019-06-12 PROCEDURE — 87480 CANDIDA DNA DIR PROBE: CPT

## 2019-06-12 PROCEDURE — 87510 GARDNER VAG DNA DIR PROBE: CPT

## 2019-06-12 PROCEDURE — 87660 TRICHOMONAS VAGIN DIR PROBE: CPT

## 2019-06-12 PROCEDURE — 99214 OFFICE O/P EST MOD 30 MIN: CPT | Performed by: OBSTETRICS & GYNECOLOGY

## 2019-06-12 NOTE — NON-PROVIDER
Pt here complaining of Vaginal itching   Denies burning or pelvic pain  Pharmacy verified  Good# [phone]  Last mammogram: 01/2019

## 2019-06-12 NOTE — PROGRESS NOTES
GYN Consult    CC/reason for consult: vaginal itching, pelvic pressure    HPI: Jessica Davis is a 53 y.o.  with complaints of vaginal itching for the last 3 years.  She also states that she has a feeling of pelvic pressure, like a bowling ball sitting in her pelvis.  She states that she has had that ever since she started menstruating.  She saw a physician in 2015 who did an ultrasound and saw fluid by her uterus so performed a hysterectomy.  However, this did not help with the pelvic pain.  She states that her vaginal itching is so bad that she is unable to have intercourse.  It is worse at night.  Denies any fevers or chills, boils, or discharge from the area.  She is currently on an estrogen patch and denies any hot flashes or night sweats.  She states she has had a biopsy performed in the past that demonstrated benign tissue.      ROS:  constitutional: denies fevers, general concerns  CV: denies chest pain, palpitations, edema  Resp: denies shortness of breath, cough  GI: denies abd pain, N/V, diarrhea/constipation, blood in stool  : denies irregular vaginal bleeding, discharge, pain, denies urinary complaints  Neuro: denies hx of migraines w/ aura  Endo: denies significant weight changes, irregular menses, temperature intolerance, denies hotflashes/nightsweats  Heme/lymph: denies easy bleeding/bruising, denies swollen glands  Psych: denies concerns about mood, denies SI  Allergy: denies concerns      OB History    Para Term  AB Living   3 3 3         SAB TAB Ectopic Molar Multiple Live Births                    # Outcome Date GA Lbr Tod/2nd Weight Sex Delivery Anes PTL Lv   3 Term            2 Term            1 Term                   Past Medical History:   Diagnosis Date   • Arthritis     back   • Gynecological disorder     had hysterectomy   • Hypertension    • S/P endometrial ablation 3/2/2016    2005 ablation done. EMB in - benign proliferative.    • Sleep apnea     mild- no  cpap   • Snoring    • Thickened endometrium 3/28/2016       Past Surgical History:   Procedure Laterality Date   • CYSTOSCOPY N/A 5/27/2016    Procedure: CYSTOSCOPY Possible;  Surgeon: Amanda Ramey M.D.;  Location: SURGERY SAME DAY [facility];  Service:    • VAGINAL HYSTERECTOMY SCOPE TOTAL N/A 5/27/2016    Procedure: ATTEMPTED VAGINAL HYSTERECTOMY SCOPE TOTAL WITH BILATERAL SALPINGECTOMY converted to laparotomy;  Surgeon: Amanda Ramey M.D.;  Location: SURGERY SAME DAY [facility];  Service:    • ABDOMINAL HYSTERECTOMY TOTAL N/A 5/27/2016    Procedure: ABDOMINAL HYSTERECTOMY TOTAL WITH BILATERAL SALPINGectomy, Left OOPHERECTOMY. dense pelvic adhesiolysis.   partial omentectomy. ;  Surgeon: Amanda Ramey M.D.;  Location: SURGERY SAME DAY [facility];  Service:    • HYSTEROSCOPY WITH VIDEO DIAGNOSTIC N/A 3/28/2016    Procedure: HYSTEROSCOPY WITH VIDEO DIAGNOSTIC AND VULVAR BIOPSY;  Surgeon: Amanda Ramey M.D.;  Location: SURGERY SAME DAY [facility];  Service:    • DILATION AND CURETTAGE N/A 3/28/2016    Procedure: DILATION AND CURETTAGE;  Surgeon: Amanda Ramey M.D.;  Location: SURGERY SAME DAY [facility];  Service:    • CHOLECYSTECTOMY     • GYN SURGERY      ablation   • OTHER      left breast lumpectomy   • OTHER NEUROLOGICAL SURG      back surgery x2       Medications:   Current Outpatient Prescriptions Ordered in [facility]   Medication Sig Dispense Refill   • nystatin/triamcinolone (MYCOLOG) 840295-8.1 UNIT/GM-% Cream Apply topically to affected area up to four times daily 30 g 11   • amphetamine-dextroamphetamine (ADDERALL) 5 MG Tab Take 1 Tab by mouth 2 times a day for 30 days. 60 Tab 0   • montelukast (SINGULAIR) 10 MG Tab Take 10 mg by mouth every day.     • triamcinolone acetonide (KENALOG) 0.1 % Ointment Apply  to affected area(s) 2 times a day.     • citalopram (CELEXA) 20 MG Tab Take 1 Tab by mouth every day. 90 Tab 2   • triamcinolone acetonide (KENALOG) 0.1 % Cream Apply  "thin layer to affected area twice a day as needed for eczema, do not use longer than 14 days at a time. (Patient not taking: Reported on 2019) 60 g 1   • estradiol (CLIMARA) 0.05 MG/24HR PATCH WEEKLY Apply 1 Patch to skin as directed every 7 days. 4 Patch 4     No current Epic-ordered facility-administered medications on file.        Allergies: Percocet [apap-fd&c red #40 [place]-oxycodone]; Vicodin [hydrocodone-acetaminophen]; and Nkda [no known drug allergy]    Social History     Social History   • Marital status:      Spouse name: N/A   • Number of children: N/A   • Years of education: N/A     Social History Main Topics   • Smoking status: Former Smoker     Packs/day: 0.50     Years: 35.00     Types: Cigarettes   • Smokeless tobacco: Never Used      Comment: quit on 19   • Alcohol use Yes      Comment: rarely   • Drug use: No   • Sexual activity: Not on file     Other Topics Concern   • Not on file     Social History Narrative   • No narrative on file       Family History   Problem Relation Age of Onset   • Dementia Mother    • Alcohol abuse Mother    • Alzheimer's Disease Father    • Cancer Father         prostate, stage four pelvic    • ADD / ADHD Father    • ADD / ADHD Daughter        Physical Exam:  /96   Ht 1.727 m (5' 8\")   Wt 93.4 kg (206 lb)   BMI 31.32 kg/m²   gen: AAO, NAD, affect appropriate  CV: RRR; no LE edema  Resp: Symmetric non labored breathing, CTAB  Abd: soft, NT, ND, no masses, no organomegaly, no hernias  : celia reticular white pattern most noticably on the right labia minora and majora, some tissue thinning and splitting at the right labia minora as well as the perineum, otherwise normal urethral meatus, normal anus/perineum, normal vagina. Uterus surgically absent  Oral: no celia reticular pattern noted to be obvious in the oropharyngeal cavity   Skin: warm/dry, no lesions    A/P: 53 y.o.  with   1. Lichen sclerosus  nystatin/triamcinolone (MYCOLOG) " 409087-3.1 UNIT/GM-% Cream   2. Vaginal itching  VAGINAL PATHOGENS DNA PANEL   3. Pelvic pain  REFERRAL TO PHYSICAL THERAPY Reason for Therapy: Eval/Treat/Report     25 minutes spent with patient more than 50% of that time spent counseling, educating, and coordinating care.  Follow-up in 3 months to see how the Mycolog cream is working for her.

## 2019-06-14 DIAGNOSIS — R73.01 IMPAIRED FASTING GLUCOSE: ICD-10-CM

## 2019-06-19 NOTE — PROGRESS NOTES
"PSYCHIATRY FOLLOW-UP NOTE    Chief Complaint:    \"The Adderall is not working great for me.\"    History Of Present Illness:  Jessica Davis is a 53 y.o. female with depression/anxiety comes in today for follow up, was last seen in this office by this provider on 6.4.19.     The patient notes that since starting Adderall, she has noticed a mild difference in her ADHD symptoms and feels like the medication makes her more calm and less anxious. However, she still is having the following symptoms: failing to give close attention to details, making careless mistakes, difficulty sustaining attention, not following through on instructions, failing to finish duties, difficulty organizing tasks, avoiding tasks that require sustained mental effort, losing things necessary for tasks, being distracted by extraneous stimuli, becoming forgetful in daily activities, constantly fidgeting, feeling like she is \"driven by a motor\", and difficulty waiting their turn.  She is disappointed that she does not feel more of an effect from this.  She denies any side effects and feels she is tolerating the medication well.  She requests to try a higher dosing at this time.     She does not feel depressed lately, despite many stressors: taking care of her 5 year old grandson who is developmentally delayed and care-taking for her mother with dementia.  Her sleep is poor at baseline.  She has been dieting with her , and has lost 7 lbs since her last visit.  She has started smoking again.  She continues to feel mildly tense with some irritability, mostly due to situational factors.  She denies panic attacks.  She denies suicidal thoughts, passive or active. The patient denies symptoms of hypomania, psychosis or homicidal ideations.      Current psychiatric medications   Celexa 20mg po q day  Adderall 5mg po BID     Previous medication trials:   Prozac, Valium after a panic attack, a few others cannot remember their names     Past " Psychiatric History:   Past prescribing provider(s): Denies  Prior psychiatric hospitalization: Denies  Hx of self-harm/suicide attempt: Denies  Hx of psychotherapy: Marital counseling before when her children were young.     Social History (changes since last visit):    29 years currently.  Has 3 grown kids, all in the area. Lives with  and 95-year-old mother and is the care taker of her and  for her grandson. Not working currently because of this.  Just bought a travel trailer, planning a camping trip this Friday.      Substance Use:  Alcohol: rare, if any   Nicotine: current smoker  Illicit drugs: Denies.   Caffeine: 4 cups of coffee/day    Medications:  Current Outpatient Prescriptions   Medication Sig Dispense Refill   • nystatin/triamcinolone (MYCOLOG) 170584-4.1 UNIT/GM-% Cream Apply topically to affected area up to four times daily 30 g 11   • amphetamine-dextroamphetamine (ADDERALL) 5 MG Tab Take 1 Tab by mouth 2 times a day for 30 days. 60 Tab 0   • montelukast (SINGULAIR) 10 MG Tab Take 10 mg by mouth every day.     • triamcinolone acetonide (KENALOG) 0.1 % Ointment Apply  to affected area(s) 2 times a day.     • citalopram (CELEXA) 20 MG Tab Take 1 Tab by mouth every day. 90 Tab 2   • triamcinolone acetonide (KENALOG) 0.1 % Cream Apply thin layer to affected area twice a day as needed for eczema, do not use longer than 14 days at a time. (Patient not taking: Reported on 1/25/2019) 60 g 1   • estradiol (CLIMARA) 0.05 MG/24HR PATCH WEEKLY Apply 1 Patch to skin as directed every 7 days. 4 Patch 4     No current facility-administered medications for this visit.      Depression Screening (PHQ-9 Score):   Depression Screen (PHQ-2/PHQ-9) 6/14/2018 1/15/2019 3/12/2019   PHQ-2 Total Score 0 0 2   PHQ-9 Total Score - - 9     Interpretation of PHQ-9 Total Score   Score Severity   1-4 No Depression   5-9 Mild Depression   10-14 Moderate Depression   15-19 Moderately Severe Depression   20-27  "Severe Depression    Physical Examination:  152/96, 67, 14, 198lbs, 5'8\"     Review of Symptoms:  Constitutional: denies recent chills, fevers.     Neuro: denies recent weakness, numbness, or headaches.   HEENT: denies recent nasal congestion or sore throat.  Cardiovascular: Hx of dyslipidemia. HTN.  Respiratory: denies recent shortness of breath, dyspnea, or cough.  Hx of sleep apnea.  Smoking again.  GI: denies recent nausea, vomiting, or diarrhea.  : denies recent urinary urgency frequency or urgency.  Hx of HRT  Musculoskeletal: age-appropriate gait and station, good balance. No abnormal movements noted.   Skin: hx of eczema   Endocrine: denies recent cold and heat intolerance, denies excessive thirst.   Hematologic: denies recent abnormal bleeding and bruising easily.  Psychiatric: Positive for symptoms of ADHD, mild depression/anxiety      Mental Status Examination:   Appearance: 53 y.o.  female, dressed in casual attire, long gray hair, nose ring, grooming and hygiene appropriate, appropriate to weather, neat,  Behavior: calm and cooperative, good eye contact, no psychomotor agitation or retardation noted  Participation: active verbal participation, engaged  Speech: spontaneous, regular rate, rhythm, and volume noted.  Language appropriate.  Mood: \"overloaded.\"  Affect: calm and mood congruent  Orientation: alert and oriented to person, place, situation, and time.  Attention/concentration: Intact.   Thought Process: linear, logical, and goal-directed  Thought Content: denies passive/active suicidal or homicidal ideations, intent, or plan, denies homicidal ideations  Perception: denies auditory or visual hallucinations, no delusions noted  Fund of knowledge and vocabulary: Adequate.  Memory: Recent memory limited, Remote memory adequate  Judgment: Fair.     Medical Records/Labs/Diagnostic Tests Reviewed:   records reviewed, recent relevant provider encounters reviewed, recent relevant labs in " record reviewed      Strengths/Assets:  Patient strengths identified included self-awareness, family support, social skills, hopeful for future     Impression:  ADHD, predominantly inattentive type  Irritability     Plan:  1. Medication options, alternatives (including no medications) and medication risks/benefits/side effects were discussed in detail.   2. Continue Celexa 20mg po q Day for continued anxious/irritable symptoms.    3. Increase Adderall to Adderall XR 20 mg q am, #30, 0 refills for continued ADHD symptoms.  Common side effects of stimulants including anxiety, headache, loss of appetite, trouble sleeping, weight loss, HTN, exacerbation of tics, overstimulation, tremor, and dizziness were explained to patient, verbalized understanding.  4. The patient was advised to call, message provider on [facility], or come in to the clinic if symptoms worsen or if any future questions/issues regarding their medications arise; the patient verbalized understanding and agreement.    5. The patient was educated to call 911, call the suicide hotline, or go to local ER if having thoughts of suicide or homicide; verbalized understanding.    Return to clinic in 4 weeks or sooner if symptoms worsen    The proposed treatment plan was discussed with the patient who was provided the opportunity to ask questions and make suggestions regarding alternative treatment. Patient verbalized understanding and expressed agreement with the plan.     Total face-to-face time: 25 minutes with more than 50% of face-to-face time spent in counseling and coordinating care as stated in the above plan.     Adalberto Kimball A.P.R.N.  06/18/19    This note was created using voice recognition software (Dragon). The accuracy of the dictation is limited by the abilities of the software. I have reviewed the note prior to signing, however some errors in grammar and context are still possible. If you have any questions related to this note please do not  hesitate to contact our office.

## 2019-07-02 ENCOUNTER — OFFICE VISIT (OUTPATIENT)
Dept: BEHAVIORAL HEALTH | Facility: CLINIC | Age: 54
End: 2019-07-02
Payer: MEDICARE

## 2019-07-02 VITALS
WEIGHT: 198 LBS | RESPIRATION RATE: 14 BRPM | SYSTOLIC BLOOD PRESSURE: 152 MMHG | DIASTOLIC BLOOD PRESSURE: 96 MMHG | HEIGHT: 68 IN | HEART RATE: 67 BPM | BODY MASS INDEX: 30.01 KG/M2

## 2019-07-02 DIAGNOSIS — F90.0 ADHD (ATTENTION DEFICIT HYPERACTIVITY DISORDER), INATTENTIVE TYPE: ICD-10-CM

## 2019-07-02 DIAGNOSIS — R45.4 IRRITABILITY: ICD-10-CM

## 2019-07-02 PROCEDURE — 99213 OFFICE O/P EST LOW 20 MIN: CPT | Performed by: NURSE PRACTITIONER

## 2019-07-02 RX ORDER — DEXTROAMPHETAMINE SACCHARATE, AMPHETAMINE ASPARTATE MONOHYDRATE, DEXTROAMPHETAMINE SULFATE AND AMPHETAMINE SULFATE 5; 5; 5; 5 MG/1; MG/1; MG/1; MG/1
20 CAPSULE, EXTENDED RELEASE ORAL EVERY MORNING
Qty: 30 CAP | Refills: 0 | Status: SHIPPED | OUTPATIENT
Start: 2019-07-02 | End: 2019-07-29 | Stop reason: SDUPTHER

## 2019-07-09 ENCOUNTER — TELEPHONE (OUTPATIENT)
Dept: MEDICAL GROUP | Facility: PHYSICIAN GROUP | Age: 54
End: 2019-07-09

## 2019-07-09 NOTE — TELEPHONE ENCOUNTER
Future Appointments       Provider Department Center    7/15/2019 1:45 PM Jackeline Quinn P.A.-C. [facility] NATALEE Zhang    7/29/2019 5:30 PM [facility] [address]    8/9/2019 2:30 PM Keli Mohr D.O. [facility] [address]        ESTABLISHED PATIENT PRE-VISIT PLANNING     Patient was NOT contacted to complete PVP.     Note: Patient will not be contacted if there is no indication to call.     1.  Reviewed notes from the last few office visits within the medical group: Yes    2.  If any orders were placed at last visit or intended to be done for this visit (i.e. 6 mos follow-up), do we have Results/Consult Notes?        •  Labs - Labs ordered, NOT completed. Patient advised to complete prior to next appointment.   Note: If patient appointment is for lab review and patient did not complete labs, check with provider if OK to reschedule patient until labs completed.       •  Imaging - Imaging was not ordered at last office visit.       •  Referrals - No referrals were ordered at last office visit.    3. Is this appointment scheduled as a Hospital Follow-Up? No    4.  Immunizations were updated in [employer] using WebIZ?: Yes       •  Web Iz Recommendations: FLU, MMR , TDAP, VARICELLA (Chicken Pox)  and SHINGRIX (Shingles)    5.  Patient is due for the following Health Maintenance Topics:   Health Maintenance Due   Topic Date Due   • Annual Wellness Visit  1965   • HEPATITIS C SCREENING  1965   • IMM DTaP/Tdap/Td Vaccine (1 - Tdap) 11/21/1983   • IMM PNEUMOCOCCAL 19-64 (ADULT) HIGHEST RISK SERIES (2 of 3 - PCV13) 05/29/2017       6. Orders for overdue Health Maintenance topics pended in Pre-Charting? YES    7.  AHA (MDX) form printed for Provider? No, already completed    8.  Patient was informed to arrive 15 min prior to their scheduled appointment and bring in their medication bottles.

## 2019-07-11 ENCOUNTER — TELEPHONE (OUTPATIENT)
Dept: MEDICAL GROUP | Facility: PHYSICIAN GROUP | Age: 54
End: 2019-07-11

## 2019-07-11 NOTE — TELEPHONE ENCOUNTER
Future Appointments       Provider Department Center    7/18/2019 4:05 PM Jackeline Quinn P.A.-C. [facility]    7/29/2019 5:30 PM [facility] [address]        ESTABLISHED PATIENT PRE-VISIT PLANNING     Patient was NOT contacted to complete PVP.     Note: Patient will not be contacted if there is no indication to call.     1.  Reviewed notes from the last few office visits within the medical group: Yes    2.  If any orders were placed at last visit or intended to be done for this visit (i.e. 6 mos follow-up), do we have Results/Consult Notes?        •  Labs - Labs ordered, NOT completed. Patient advised to complete prior to next appointment.   Note: If patient appointment is for lab review and patient did not complete labs, check with provider if OK to reschedule patient until labs completed.       •  Imaging - Imaging was not ordered at last office visit.       •  Referrals - Referral ordered, patient has NOT been seen.    3. Is this appointment scheduled as a Hospital Follow-Up? No    4.  Immunizations were updated in [employer] using WebIZ?: Yes       •  Web Iz Recommendations: FLU, MMR , TDAP, VARICELLA (Chicken Pox)  and SHINGRIX (Shingles)    5.  Patient is due for the following Health Maintenance Topics:   Health Maintenance Due   Topic Date Due   • Annual Wellness Visit  1965   • HEPATITIS C SCREENING  1965   • IMM DTaP/Tdap/Td Vaccine (1 - Tdap) 11/21/1983   • IMM PNEUMOCOCCAL 19-64 (ADULT) HIGHEST RISK SERIES (2 of 3 - PCV13) 05/29/2017       6. Orders for overdue Health Maintenance topics pended in Pre-Charting? YES    7.  AHA (MDX) form printed for Provider? No, already completed    8.  Patient was informed to arrive 15 min prior to their scheduled appointment and bring in their medication bottles.

## 2019-07-15 ENCOUNTER — HOSPITAL ENCOUNTER (OUTPATIENT)
Dept: LAB | Facility: MEDICAL CENTER | Age: 54
End: 2019-07-15
Attending: PHYSICIAN ASSISTANT
Payer: MEDICARE

## 2019-07-15 DIAGNOSIS — R73.01 IMPAIRED FASTING GLUCOSE: ICD-10-CM

## 2019-07-15 DIAGNOSIS — E78.5 DYSLIPIDEMIA: ICD-10-CM

## 2019-07-15 DIAGNOSIS — D58.2 ELEVATED HEMOGLOBIN (HCC): ICD-10-CM

## 2019-07-15 LAB
ALBUMIN SERPL BCP-MCNC: 4.5 G/DL (ref 3.2–4.9)
ALBUMIN/GLOB SERPL: 1.7 G/DL
ALP SERPL-CCNC: 64 U/L (ref 30–99)
ALT SERPL-CCNC: 35 U/L (ref 2–50)
ANION GAP SERPL CALC-SCNC: 10 MMOL/L (ref 0–11.9)
AST SERPL-CCNC: 21 U/L (ref 12–45)
BASOPHILS # BLD AUTO: 1.1 % (ref 0–1.8)
BASOPHILS # BLD: 0.07 K/UL (ref 0–0.12)
BILIRUB SERPL-MCNC: 0.6 MG/DL (ref 0.1–1.5)
BUN SERPL-MCNC: 17 MG/DL (ref 8–22)
CALCIUM SERPL-MCNC: 9.5 MG/DL (ref 8.5–10.5)
CHLORIDE SERPL-SCNC: 107 MMOL/L (ref 96–112)
CHOLEST SERPL-MCNC: 211 MG/DL (ref 100–199)
CO2 SERPL-SCNC: 23 MMOL/L (ref 20–33)
CREAT SERPL-MCNC: 0.77 MG/DL (ref 0.5–1.4)
EOSINOPHIL # BLD AUTO: 0.1 K/UL (ref 0–0.51)
EOSINOPHIL NFR BLD: 1.5 % (ref 0–6.9)
ERYTHROCYTE [DISTWIDTH] IN BLOOD BY AUTOMATED COUNT: 40.6 FL (ref 35.9–50)
FASTING STATUS PATIENT QL REPORTED: NORMAL
GLOBULIN SER CALC-MCNC: 2.6 G/DL (ref 1.9–3.5)
GLUCOSE SERPL-MCNC: 81 MG/DL (ref 65–99)
HCT VFR BLD AUTO: 51 % (ref 37–47)
HDLC SERPL-MCNC: 41 MG/DL
HGB BLD-MCNC: 16.5 G/DL (ref 12–16)
IMM GRANULOCYTES # BLD AUTO: 0.01 K/UL (ref 0–0.11)
IMM GRANULOCYTES NFR BLD AUTO: 0.2 % (ref 0–0.9)
LDLC SERPL CALC-MCNC: 137 MG/DL
LYMPHOCYTES # BLD AUTO: 2.31 K/UL (ref 1–4.8)
LYMPHOCYTES NFR BLD: 34.9 % (ref 22–41)
MCH RBC QN AUTO: 29.5 PG (ref 27–33)
MCHC RBC AUTO-ENTMCNC: 32.4 G/DL (ref 33.6–35)
MCV RBC AUTO: 91.1 FL (ref 81.4–97.8)
MONOCYTES # BLD AUTO: 0.35 K/UL (ref 0–0.85)
MONOCYTES NFR BLD AUTO: 5.3 % (ref 0–13.4)
NEUTROPHILS # BLD AUTO: 3.77 K/UL (ref 2–7.15)
NEUTROPHILS NFR BLD: 57 % (ref 44–72)
NRBC # BLD AUTO: 0 K/UL
NRBC BLD-RTO: 0 /100 WBC
PLATELET # BLD AUTO: 269 K/UL (ref 164–446)
PMV BLD AUTO: 11.5 FL (ref 9–12.9)
POTASSIUM SERPL-SCNC: 4.2 MMOL/L (ref 3.6–5.5)
PROT SERPL-MCNC: 7.1 G/DL (ref 6–8.2)
RBC # BLD AUTO: 5.6 M/UL (ref 4.2–5.4)
SODIUM SERPL-SCNC: 140 MMOL/L (ref 135–145)
TRIGL SERPL-MCNC: 166 MG/DL (ref 0–149)
WBC # BLD AUTO: 6.6 K/UL (ref 4.8–10.8)

## 2019-07-15 PROCEDURE — 85025 COMPLETE CBC W/AUTO DIFF WBC: CPT

## 2019-07-15 PROCEDURE — 83036 HEMOGLOBIN GLYCOSYLATED A1C: CPT

## 2019-07-15 PROCEDURE — 36415 COLL VENOUS BLD VENIPUNCTURE: CPT

## 2019-07-15 PROCEDURE — 80061 LIPID PANEL: CPT

## 2019-07-15 PROCEDURE — 80053 COMPREHEN METABOLIC PANEL: CPT

## 2019-07-16 LAB
EST. AVERAGE GLUCOSE BLD GHB EST-MCNC: 108 MG/DL
HBA1C MFR BLD: 5.4 % (ref 0–5.6)

## 2019-07-18 ENCOUNTER — OFFICE VISIT (OUTPATIENT)
Dept: MEDICAL GROUP | Facility: PHYSICIAN GROUP | Age: 54
End: 2019-07-18
Payer: MEDICARE

## 2019-07-18 VITALS
DIASTOLIC BLOOD PRESSURE: 90 MMHG | SYSTOLIC BLOOD PRESSURE: 138 MMHG | RESPIRATION RATE: 18 BRPM | HEART RATE: 80 BPM | TEMPERATURE: 98 F | OXYGEN SATURATION: 98 % | HEIGHT: 68 IN | BODY MASS INDEX: 28.95 KG/M2 | WEIGHT: 191 LBS

## 2019-07-18 DIAGNOSIS — M79.641 BILATERAL HAND PAIN: ICD-10-CM

## 2019-07-18 DIAGNOSIS — Z79.890 HORMONE REPLACEMENT THERAPY (HRT): ICD-10-CM

## 2019-07-18 DIAGNOSIS — F90.0 ADHD (ATTENTION DEFICIT HYPERACTIVITY DISORDER), INATTENTIVE TYPE: ICD-10-CM

## 2019-07-18 DIAGNOSIS — M79.642 BILATERAL HAND PAIN: ICD-10-CM

## 2019-07-18 DIAGNOSIS — E78.5 DYSLIPIDEMIA: ICD-10-CM

## 2019-07-18 DIAGNOSIS — I10 ESSENTIAL HYPERTENSION: ICD-10-CM

## 2019-07-18 DIAGNOSIS — D58.2 ELEVATED HEMOGLOBIN (HCC): ICD-10-CM

## 2019-07-18 PROCEDURE — 99214 OFFICE O/P EST MOD 30 MIN: CPT | Performed by: PHYSICIAN ASSISTANT

## 2019-07-18 RX ORDER — LOSARTAN POTASSIUM 50 MG/1
50 TABLET ORAL DAILY
Qty: 90 TAB | Refills: 1 | Status: SHIPPED | OUTPATIENT
Start: 2019-07-18 | End: 2019-08-19

## 2019-07-18 RX ORDER — ESTRADIOL 0.05 MG/D
1 PATCH TRANSDERMAL
Qty: 12 PATCH | Refills: 3 | Status: SHIPPED | OUTPATIENT
Start: 2019-07-18

## 2019-07-18 NOTE — PROGRESS NOTES
Subjective:   Jessica Davis is a 53 y.o. female here today for follow up on hypertension and other medical problems. Is an established patient of mine.    HPI:    Patient presents to the office today for routine follow-up. I last saw her in January 2019.    Since our last visit, she has established with behavioral health and is now being treated for ADHD with Adderall XR.  Takes 20 mg daily.  She does continue on citalopram as well.    She has been having issues with elevated blood pressure, especially since starting on Adderall.  Blood pressure today in the office is 138/90.  At her last psychiatrist appointment it was 152/96.  She denies any symptoms associated with a high blood pressure including headaches, dizziness, or chest pain.  She used to be on hypertensive medication but was able to get off of it with increased exercise.    She had recent lab work done which she is wanting to go through.  Abnormalities included elevated cholesterol, although is improved from last check.  She also has elevated hemoglobin and hematocrit. She does have known history of sleep apnea.  I referred her to pulmonology at the last appointment but that has not yet been scheduled.    Patient has new concern today for bilateral hand pain and stiffness.  Onset was this past February just after her last appointment.  She notices the symptoms the worst in the morning.  Sometimes the pain resolves and sometimes it lasts throughout the whole day.  She is wanting to know what some options would be for her to help relieve the pain.  She feels that she has arthritis.    Patient is requesting that I take over management of her Climara patches.  She has been having difficulty getting these consistently.  She uses them for hot flashes and they have been quite helpful.  Has been on HRT since her hysterectomy in 2016.    Finally, patient mentions that she has been experiencing morning pain and stiffness in both of her hands since about this  "past February. Sometimes discomfort will last all day. Wondering about things she can try to improve symptoms.      Current medicines (including changes today)  Current Outpatient Prescriptions   Medication Sig Dispense Refill   • losartan (COZAAR) 50 MG Tab Take 1 Tab by mouth every day. 90 Tab 1   • estradiol (CLIMARA) 0.05 MG/24HR PATCH WEEKLY Apply 1 Patch to skin as directed every 7 days. 12 Patch 3   • amphetamine-dextroamphetamine (ADDERALL XR) 20 MG per XR capsule Take 1 Cap by mouth every morning for 30 days. 30 Cap 0   • nystatin/triamcinolone (MYCOLOG) 172467-2.1 UNIT/GM-% Cream Apply topically to affected area up to four times daily 30 g 11   • montelukast (SINGULAIR) 10 MG Tab Take 10 mg by mouth every day.     • triamcinolone acetonide (KENALOG) 0.1 % Ointment Apply  to affected area(s) 2 times a day.     • citalopram (CELEXA) 20 MG Tab Take 1 Tab by mouth every day. 90 Tab 2   • triamcinolone acetonide (KENALOG) 0.1 % Cream Apply thin layer to affected area twice a day as needed for eczema, do not use longer than 14 days at a time. 60 g 1     No current facility-administered medications for this visit.      She  has a past medical history of Arthritis; Gynecological disorder; Hypertension; S/P endometrial ablation (3/2/2016); Sleep apnea; Snoring; and Thickened endometrium (3/28/2016).    ROS  Pulmonary ROS: No shortness of breath  Cardiovascular ROS: No chest pain     Objective:     /90 (BP Location: Right arm, Patient Position: Sitting, BP Cuff Size: Large adult)   Pulse 80   Temp 36.7 °C (98 °F) (Temporal)   Resp 18   Ht 1.727 m (5' 8\")   Wt 86.6 kg (191 lb)   SpO2 98%  Body mass index is 29.04 kg/m².     Physical Exam:  Constitutional: Alert, well-appearing, very pleasant, no distress.  Skin: Warm, dry, good turgor, no rashes in visible areas.  Eye: Pupils are equal and round, conjunctiva clear, lids normal.  ENMT: Lips without lesions, moist mucus membranes.  Respiratory: Unlabored " respiratory effort, lungs clear to auscultation, no wheezes, no rhonchi.  Cardiovascular: Normal S1, S2, no murmur, no lower extremity edema.  Musculoskeletal: Hands appear normal.  No obvious edema or Heberden's/Dieter's nodes.      Assessment and Plan:   The following treatment plan was discussed    1. Essential hypertension  Established problem, uncontrolled.  Not currently on any medication.  Discussed that medication is not recommended given that blood pressures are going up and she is now on Adderall which is a stimulant medication that tends to raise the blood pressure.  Will start on losartan 50 mg daily.  She states she cannot take lisinopril due to cough.  Patient advised to start checking home blood pressure readings on daily basis and send me readings in 2 weeks.  Will have her follow-up in the office again in 3 months.  - losartan (COZAAR) 50 MG Tab; Take 1 Tab by mouth every day.  Dispense: 90 Tab; Refill: 1    2. Dyslipidemia  Chronic issue, uncontrolled but improving.  She has been working on lifestyle modification at home.  Is overall eating much less carbohydrates.  ASCVD risk score is only 4%.  I feel it is reasonable for her to continue working on lifestyle adjustment to bring down cholesterol.  If still elevated in 1 year when we recheck, starting statin again will be advised.    3. Elevated hemoglobin (HCC)  Established problem, uncontrolled.  Suspect secondary to untreated sleep apnea given that history.  Recommend that she follow-up with the sleep medicine clinic.  Referral is still active.    4. ADHD (attention deficit hyperactivity disorder), inattentive type  New problem to me, improving with Adderall.  Has been seeing psychiatry.  She states that she did ask her psychiatrist if it would be okay for me to take over management of her medication when she is stabilized.  She states that she was told that would be fine.  I am in agreement with that plan as well.    5. Hormone replacement  therapy (HRT)  Chronic issue, hot flashes well controlled with Climara patch.  I have refilled this for her.  - estradiol (CLIMARA) 0.05 MG/24HR PATCH WEEKLY; Apply 1 Patch to skin as directed every 7 days.  Dispense: 12 Patch; Refill: 3    6. Bilateral hand pain  New problem, uncontrolled. Pain and stiffness likely secondary to osteoarthritis. Discussed that she can take either OTC Tylenol or anti-inflammatory. OTC supplements like glucosamine/chrondroitin and turmeric are helpful for some people. Topical treatments like Bengay or capsaicin can also be helpful. Follow up for significant worsening.      Followup: Return in about 3 months (around 10/18/2019) for f/u HTN; Short.    Jackeline Quinn P.A.-C.

## 2019-07-24 NOTE — PROGRESS NOTES
"PSYCHIATRY FOLLOW-UP NOTE    Chief Complaint:    \"***\"    History Of Present Illness:  Jessica Davis is a 53 y.o. female with depression/anxiety comes in today for follow up, was last seen in this office by this provider on 7.2.19.     The patient notes that since starting Adderall, she has noticed a mild difference in her ADHD symptoms and feels like the medication makes her more calm and less anxious. However, she still is having the following symptoms: failing to give close attention to details, making careless mistakes, difficulty sustaining attention, not following through on instructions, failing to finish duties, difficulty organizing tasks, avoiding tasks that require sustained mental effort, losing things necessary for tasks, being distracted by extraneous stimuli, becoming forgetful in daily activities, constantly fidgeting, feeling like she is \"driven by a motor\", and difficulty waiting their turn.  She is disappointed that she does not feel more of an effect from this.  She denies any side effects and feels she is tolerating the medication well.  She requests to try a higher dosing at this time.     She does not feel depressed lately, despite many stressors: taking care of her 5 year old grandson who is developmentally delayed and care-taking for her mother with dementia.  Her sleep is poor at baseline.  She has been dieting with her , and has lost 7 lbs since her last visit.  She has started smoking again.  She continues to feel mildly tense with some irritability, mostly due to situational factors.  She denies panic attacks.  She denies suicidal thoughts, passive or active. The patient denies symptoms of hypomania, psychosis or homicidal ideations.      Current psychiatric medications   Celexa 20mg po q day  Adderall 5mg po BID     Previous medication trials:   Prozac, Valium after a panic attack, a few others cannot remember their names     Past Psychiatric History:   Past prescribing " provider(s): Denies  Prior psychiatric hospitalization: Denies  Hx of self-harm/suicide attempt: Denies  Hx of psychotherapy: Marital counseling before when her children were young.     Social History (changes since last visit):    29 years currently.  Has 3 grown kids, all in the area. Lives with  and 95-year-old mother and is the care taker of her and  for her grandson. Not working currently because of this.  Just bought a travel trailer, planning a camping trip this Friday.      Substance Use:  Alcohol: rare, if any   Nicotine: current smoker  Illicit drugs: Denies.   Caffeine: 4 cups of coffee/day    Medications:  Current Outpatient Prescriptions   Medication Sig Dispense Refill   • losartan (COZAAR) 50 MG Tab Take 1 Tab by mouth every day. 90 Tab 1   • estradiol (CLIMARA) 0.05 MG/24HR PATCH WEEKLY Apply 1 Patch to skin as directed every 7 days. 12 Patch 3   • amphetamine-dextroamphetamine (ADDERALL XR) 20 MG per XR capsule Take 1 Cap by mouth every morning for 30 days. 30 Cap 0   • nystatin/triamcinolone (MYCOLOG) 853601-9.1 UNIT/GM-% Cream Apply topically to affected area up to four times daily 30 g 11   • montelukast (SINGULAIR) 10 MG Tab Take 10 mg by mouth every day.     • triamcinolone acetonide (KENALOG) 0.1 % Ointment Apply  to affected area(s) 2 times a day.     • citalopram (CELEXA) 20 MG Tab Take 1 Tab by mouth every day. 90 Tab 2   • triamcinolone acetonide (KENALOG) 0.1 % Cream Apply thin layer to affected area twice a day as needed for eczema, do not use longer than 14 days at a time. 60 g 1     No current facility-administered medications for this visit.      Depression Screening (PHQ-9 Score): ***  Depression Screen (PHQ-2/PHQ-9) 6/14/2018 1/15/2019 3/12/2019   PHQ-2 Total Score 0 0 2   PHQ-9 Total Score - - 9     Interpretation of PHQ-9 Total Score   Score Severity   1-4 No Depression   5-9 Mild Depression   10-14 Moderate Depression   15-19 Moderately Severe Depression  "  20-27 Severe Depression    Physical Examination:  152/96, 67, 14, 198lbs, 5'8\"     Review of Symptoms:  Constitutional: denies recent chills, fevers.     Neuro: denies recent weakness, numbness, or headaches.   HEENT: denies recent nasal congestion or sore throat.  Cardiovascular: Hx of dyslipidemia. HTN.  Respiratory: denies recent shortness of breath, dyspnea, or cough.  Hx of sleep apnea.  Smoking again.  GI: denies recent nausea, vomiting, or diarrhea.  : denies recent urinary urgency frequency or urgency.  Hx of HRT  Musculoskeletal: age-appropriate gait and station, good balance. No abnormal movements noted.   Skin: hx of eczema   Endocrine: denies recent cold and heat intolerance, denies excessive thirst.   Hematologic: denies recent abnormal bleeding and bruising easily.  Psychiatric: Positive for symptoms of ADHD, mild depression/anxiety      Mental Status Examination:   Appearance: 53 y.o.  female, dressed in casual attire, long gray hair, nose ring, grooming and hygiene appropriate, appropriate to weather, neat,  Behavior: calm and cooperative, good eye contact, no psychomotor agitation or retardation noted  Participation: active verbal participation, engaged  Speech: spontaneous, regular rate, rhythm, and volume noted.  Language appropriate.  Mood: \"overloaded.\"  Affect: calm and mood congruent  Orientation: alert and oriented to person, place, situation, and time.  Attention/concentration: Intact.   Thought Process: linear, logical, and goal-directed  Thought Content: denies passive/active suicidal or homicidal ideations, intent, or plan, denies homicidal ideations  Perception: denies auditory or visual hallucinations, no delusions noted  Fund of knowledge and vocabulary: Adequate.  Memory: Recent memory limited, Remote memory adequate  Judgment: Fair.     Medical Records/Labs/Diagnostic Tests Reviewed:   records reviewed, recent relevant provider encounters reviewed, recent relevant " labs in record reviewed      Strengths/Assets:  Patient strengths identified included self-awareness, family support, social skills, hopeful for future     Impression:  ADHD, predominantly inattentive type  Irritability     Plan:  1. Medication options, alternatives (including no medications) and medication risks/benefits/side effects were discussed in detail.   2. Continue Celexa 20mg po q Day for continued anxious/irritable symptoms.    3. Increase Adderall to Adderall XR 20 mg q am, #30, 0 refills for continued ADHD symptoms.  Common side effects of stimulants including anxiety, headache, loss of appetite, trouble sleeping, weight loss, HTN, exacerbation of tics, overstimulation, tremor, and dizziness were explained to patient, verbalized understanding.  4. The patient was advised to call, message provider on [facility], or come in to the clinic if symptoms worsen or if any future questions/issues regarding their medications arise; the patient verbalized understanding and agreement.    5. The patient was educated to call 911, call the suicide hotline, or go to local ER if having thoughts of suicide or homicide; verbalized understanding.     Return to clinic in *** or sooner if symptoms worsen    The proposed treatment plan was discussed with the patient who was provided the opportunity to ask questions and make suggestions regarding alternative treatment. Patient verbalized understanding and expressed agreement with the plan.     Total face-to-face time: *** minutes with more than 50% of face-to-face time spent in counseling and coordinating care as stated in the above plan.     Adalberto Kimball A.P.R.N.  07/24/19    This note was created using voice recognition software (Dragon). The accuracy of the dictation is limited by the abilities of the software. I have reviewed the note prior to signing, however some errors in grammar and context are still possible. If you have any questions related to this note please do not  hesitate to contact our office.

## 2019-07-29 ENCOUNTER — APPOINTMENT (OUTPATIENT)
Dept: BEHAVIORAL HEALTH | Facility: CLINIC | Age: 54
End: 2019-07-29
Payer: MEDICARE

## 2019-07-29 DIAGNOSIS — F90.0 ADHD (ATTENTION DEFICIT HYPERACTIVITY DISORDER), INATTENTIVE TYPE: ICD-10-CM

## 2019-07-29 RX ORDER — DEXTROAMPHETAMINE SACCHARATE, AMPHETAMINE ASPARTATE MONOHYDRATE, DEXTROAMPHETAMINE SULFATE AND AMPHETAMINE SULFATE 5; 5; 5; 5 MG/1; MG/1; MG/1; MG/1
20 CAPSULE, EXTENDED RELEASE ORAL EVERY MORNING
Qty: 30 CAP | Refills: 0 | Status: SHIPPED | OUTPATIENT
Start: 2019-08-01 | End: 2019-08-28 | Stop reason: SDUPTHER

## 2019-08-19 DIAGNOSIS — I10 ESSENTIAL HYPERTENSION: ICD-10-CM

## 2019-08-19 RX ORDER — LOSARTAN POTASSIUM 100 MG/1
100 TABLET ORAL DAILY
Qty: 90 TAB | Refills: 1 | Status: SHIPPED | OUTPATIENT
Start: 2019-08-19 | End: 2019-12-10 | Stop reason: SDUPTHER

## 2019-08-28 DIAGNOSIS — F90.0 ADHD (ATTENTION DEFICIT HYPERACTIVITY DISORDER), INATTENTIVE TYPE: ICD-10-CM

## 2019-08-28 RX ORDER — DEXTROAMPHETAMINE SACCHARATE, AMPHETAMINE ASPARTATE MONOHYDRATE, DEXTROAMPHETAMINE SULFATE AND AMPHETAMINE SULFATE 5; 5; 5; 5 MG/1; MG/1; MG/1; MG/1
20 CAPSULE, EXTENDED RELEASE ORAL EVERY MORNING
Qty: 30 CAP | Refills: 0 | Status: SHIPPED | OUTPATIENT
Start: 2019-08-28 | End: 2019-09-10 | Stop reason: SDUPTHER

## 2019-09-05 ENCOUNTER — APPOINTMENT (RX ONLY)
Dept: URBAN - METROPOLITAN AREA CLINIC 4 | Facility: CLINIC | Age: 54
Setting detail: DERMATOLOGY
End: 2019-09-05

## 2019-09-05 DIAGNOSIS — D22 MELANOCYTIC NEVI: ICD-10-CM

## 2019-09-05 DIAGNOSIS — D18.0 HEMANGIOMA: ICD-10-CM

## 2019-09-05 DIAGNOSIS — L82.1 OTHER SEBORRHEIC KERATOSIS: ICD-10-CM

## 2019-09-05 DIAGNOSIS — Z71.89 OTHER SPECIFIED COUNSELING: ICD-10-CM

## 2019-09-05 DIAGNOSIS — L90.0 LICHEN SCLEROSUS ET ATROPHICUS: ICD-10-CM

## 2019-09-05 DIAGNOSIS — L57.8 OTHER SKIN CHANGES DUE TO CHRONIC EXPOSURE TO NONIONIZING RADIATION: ICD-10-CM

## 2019-09-05 DIAGNOSIS — L81.4 OTHER MELANIN HYPERPIGMENTATION: ICD-10-CM

## 2019-09-05 PROBLEM — D22.5 MELANOCYTIC NEVI OF TRUNK: Status: ACTIVE | Noted: 2019-09-05

## 2019-09-05 PROBLEM — D22.72 MELANOCYTIC NEVI OF LEFT LOWER LIMB, INCLUDING HIP: Status: ACTIVE | Noted: 2019-09-05

## 2019-09-05 PROBLEM — D22.39 MELANOCYTIC NEVI OF OTHER PARTS OF FACE: Status: ACTIVE | Noted: 2019-09-05

## 2019-09-05 PROBLEM — D22.71 MELANOCYTIC NEVI OF RIGHT LOWER LIMB, INCLUDING HIP: Status: ACTIVE | Noted: 2019-09-05

## 2019-09-05 PROBLEM — D22.61 MELANOCYTIC NEVI OF RIGHT UPPER LIMB, INCLUDING SHOULDER: Status: ACTIVE | Noted: 2019-09-05

## 2019-09-05 PROBLEM — D18.01 HEMANGIOMA OF SKIN AND SUBCUTANEOUS TISSUE: Status: ACTIVE | Noted: 2019-09-05

## 2019-09-05 PROBLEM — D22.62 MELANOCYTIC NEVI OF LEFT UPPER LIMB, INCLUDING SHOULDER: Status: ACTIVE | Noted: 2019-09-05

## 2019-09-05 PROCEDURE — 99203 OFFICE O/P NEW LOW 30 MIN: CPT

## 2019-09-05 PROCEDURE — ? DIAGNOSIS COMMENT

## 2019-09-05 PROCEDURE — ? COUNSELING

## 2019-09-05 PROCEDURE — ? ADDITIONAL NOTES

## 2019-09-05 ASSESSMENT — LOCATION DETAILED DESCRIPTION DERM
LOCATION DETAILED: LEFT ANTERIOR DISTAL UPPER ARM
LOCATION DETAILED: LEFT INFERIOR MEDIAL MALAR CHEEK
LOCATION DETAILED: LEFT ANTERIOR PROXIMAL THIGH
LOCATION DETAILED: LEFT MEDIAL UPPER BACK
LOCATION DETAILED: INFERIOR THORACIC SPINE
LOCATION DETAILED: RIGHT LABIA MINORA
LOCATION DETAILED: EPIGASTRIC SKIN
LOCATION DETAILED: LEFT ANTERIOR PROXIMAL UPPER ARM
LOCATION DETAILED: RIGHT LABIA MAJORA
LOCATION DETAILED: MIDDLE STERNUM
LOCATION DETAILED: SUPERIOR MID FOREHEAD
LOCATION DETAILED: SUPERIOR THORACIC SPINE
LOCATION DETAILED: RIGHT ANTERIOR PROXIMAL UPPER ARM
LOCATION DETAILED: LEFT LABIA MINORA
LOCATION DETAILED: LEFT INFERIOR MEDIAL FOREHEAD
LOCATION DETAILED: RIGHT MEDIAL FOREHEAD
LOCATION DETAILED: RIGHT ANTERIOR DISTAL THIGH
LOCATION DETAILED: LEFT LABIA MAJORA
LOCATION DETAILED: RIGHT ANTERIOR DISTAL UPPER ARM
LOCATION DETAILED: RIGHT ANTERIOR SHOULDER
LOCATION DETAILED: STERNAL NOTCH
LOCATION DETAILED: LEFT SUPERIOR MEDIAL UPPER BACK
LOCATION DETAILED: LEFT ANTERIOR SHOULDER
LOCATION DETAILED: LOWER STERNUM
LOCATION DETAILED: RIGHT ANTERIOR PROXIMAL THIGH
LOCATION DETAILED: LEFT ANTERIOR DISTAL THIGH

## 2019-09-05 ASSESSMENT — LOCATION ZONE DERM
LOCATION ZONE: ARM
LOCATION ZONE: TRUNK
LOCATION ZONE: FACE
LOCATION ZONE: VULVA
LOCATION ZONE: LEG

## 2019-09-05 ASSESSMENT — LOCATION SIMPLE DESCRIPTION DERM
LOCATION SIMPLE: RIGHT UPPER ARM
LOCATION SIMPLE: LEFT THIGH
LOCATION SIMPLE: RIGHT FOREHEAD
LOCATION SIMPLE: LEFT CHEEK
LOCATION SIMPLE: VULVA
LOCATION SIMPLE: LEFT UPPER BACK
LOCATION SIMPLE: RIGHT THIGH
LOCATION SIMPLE: CHEST
LOCATION SIMPLE: SUPERIOR FOREHEAD
LOCATION SIMPLE: UPPER BACK
LOCATION SIMPLE: LEFT UPPER ARM
LOCATION SIMPLE: LEFT FOREHEAD
LOCATION SIMPLE: LEFT SHOULDER
LOCATION SIMPLE: ABDOMEN
LOCATION SIMPLE: RIGHT SHOULDER

## 2019-09-05 NOTE — HPI: EVALUATION OF SKIN LESION(S)
How Severe Are Your Spot(S)?: moderate
Have Your Spot(S) Been Treated In The Past?: has not been treated
Hpi Title: Evaluation of Skin Lesions
Additional History: Patient would like FBE today. She does apply the TAC on her arms which helps prevent it from itching.

## 2019-09-05 NOTE — PROCEDURE: DIAGNOSIS COMMENT
Comment: Diagnosed by Gyn. Has been applying nystatin-TAC with relief. Will consider high potency topical steroid if needed in future.
Detail Level: Zone

## 2019-09-05 NOTE — PROCEDURE: ADDITIONAL NOTES
Detail Level: Zone
Additional Notes: Lesions of concerns on bilateral arms, clinically consistent with seborrheic keratoses.
Additional Notes: Patient advised to continue with previously prescribed Nystatin- TAC. \\nPatient advised to contact our office if rash flares despite treatment.

## 2019-09-07 RX ORDER — DEXTROAMPHETAMINE SACCHARATE, AMPHETAMINE ASPARTATE MONOHYDRATE, DEXTROAMPHETAMINE SULFATE AND AMPHETAMINE SULFATE 5; 5; 5; 5 MG/1; MG/1; MG/1; MG/1
20 CAPSULE, EXTENDED RELEASE ORAL EVERY MORNING
Qty: 30 CAP | Refills: 0 | Status: CANCELLED | OUTPATIENT
Start: 2019-09-26 | End: 2019-10-26

## 2019-09-07 NOTE — PROGRESS NOTES
"PSYCHIATRY FOLLOW-UP NOTE    Chief Complaint:    \"I like the increase in Adderall\"    History Of Present Illness:  Jessica Davis is a 53 y.o. female with depression/anxiety/ADHD comes in today for follow up.    The patient feels she is doing \"much better\" after her increased dose of Adderall last appointment.  She feels this increased dose has made her feel \"more together,\" giving her the will to see things through, the feeling that she is accomplishing things, and she is able to complete tasks from start to finish.  She also notes that she is not feeling as anxious or irritable on this amount, and is overall \"feeling better about myself.\"  She denies side effects to this medication but still feels there is still an improvement to be made.    She notes that after almost 20 years of being on Celexa, she feels that this time to try to wean off of it.  She notes she started this med back in 2000 after becoming depressed after back surgery and has continued it ever since.  She is no longer depressed, and has not been in some time.  She is sleeping and eating well.  She denies anhedonia.  She denies suicidal ideations, passive or active.  She feels as though her anxiety and irritability are under control more than they have ever been, she denies panic attacks. The patient denies symptoms of hypomania, psychosis or homicidal ideations.      Current psychiatric medications   Celexa 20mg po q day  Adderall XR 20mg po q am     Previous medication trials:   Prozac, Valium after a panic attack, a few others cannot remember their names     Past Psychiatric History:   Past prescribing provider(s): Denies  Prior psychiatric hospitalization: Denies  Hx of self-harm/suicide attempt: Denies  Hx of psychotherapy: Marital counseling before when her children were young.     Social History (changes since last visit):    29 years currently.  Has 3 grown kids, all in the area. Lives with  and 95-year-old mother with " "dementia. Not working currently because of this.  She went camping last weekend.  Her grand child was in the ICU for several weeks after being born but is finally home.     Substance Use:  Alcohol: rare, if any   Nicotine: current smoker  Illicit drugs: Denies.   Caffeine: 2 cups of coffee/day     Medications:  Current Outpatient Medications   Medication Sig Dispense Refill   • amphetamine-dextroamphetamine (ADDERALL XR) 20 MG per XR capsule Take 1 Cap by mouth every morning for 30 days. 30 Cap 0   • losartan (COZAAR) 100 MG Tab Take 1 Tab by mouth every day. 90 Tab 1   • estradiol (CLIMARA) 0.05 MG/24HR PATCH WEEKLY Apply 1 Patch to skin as directed every 7 days. 12 Patch 3   • nystatin/triamcinolone (MYCOLOG) 099946-6.1 UNIT/GM-% Cream Apply topically to affected area up to four times daily 30 g 11   • montelukast (SINGULAIR) 10 MG Tab Take 10 mg by mouth every day.     • triamcinolone acetonide (KENALOG) 0.1 % Ointment Apply  to affected area(s) 2 times a day.     • citalopram (CELEXA) 20 MG Tab Take 1 Tab by mouth every day. 90 Tab 2   • triamcinolone acetonide (KENALOG) 0.1 % Cream Apply thin layer to affected area twice a day as needed for eczema, do not use longer than 14 days at a time. 60 g 1     No current facility-administered medications for this visit.      Depression Screening (PHQ-9 Score): 0  Depression Screen (PHQ-2/PHQ-9) 6/14/2018 1/15/2019 3/12/2019   PHQ-2 Total Score 0 0 2   PHQ-9 Total Score - - 9     Interpretation of PHQ-9 Total Score   Score Severity   1-4 No Depression   5-9 Mild Depression   10-14 Moderate Depression   15-19 Moderately Severe Depression   20-27 Severe Depression    Physical Examination:   9/10/19 1:55 PM      BP  141/94     Pulse  84     Resp  16     Weight   84.8 kg (187 lb)     Height  1.727 m (5' 8\")      Review of Symptoms:   Constitutional: denies recent chills, fevers.     Neuro: denies recent weakness, numbness, or headaches.   HEENT: denies recent nasal congestion " "or sore throat.  Cardiovascular: Hx of dyslipidemia. HTN.  Respiratory: denies recent shortness of breath, dyspnea, or cough.  Hx of sleep apnea.  Smoking again.  GI: denies recent nausea, vomiting, or diarrhea.  : denies recent urinary urgency frequency or urgency.  Hx of HRT  Musculoskeletal: age-appropriate gait and station, good balance. No abnormal movements noted.   Skin: hx of eczema   Endocrine: denies recent cold and heat intolerance, denies excessive thirst.   Hematologic: denies recent abnormal bleeding and bruising easily.  Psychiatric: Positive for symptoms of ADHD, mild depression/anxiety      Mental Status Examination:   Appearance: 53 y.o.  female, dressed in casual attire, long gray hair, nose ring, grooming and hygiene appropriate, appropriate to weather, neat,  Behavior: calm and cooperative, good eye contact, no psychomotor agitation or retardation noted  Participation: active verbal participation, engaged  Speech: spontaneous, regular rate, rhythm, and volume noted.  Language appropriate.  Mood: \"better.\"  Affect: calm and mood congruent  Orientation: alert and oriented to person, place, situation, and time.  Attention/concentration: Intact.   Thought Process: linear, logical, and goal-directed  Thought Content: denies passive/active suicidal or homicidal ideations, intent, or plan, denies homicidal ideations  Perception: denies auditory or visual hallucinations, no delusions noted  Fund of knowledge and vocabulary: Adequate.  Memory: Recent memory limited, Remote memory adequate  Judgment: Fair.     Medical Records/Labs/Diagnostic Tests Reviewed:   records reviewed, recent relevant provider encounters reviewed, recent relevant labs in record reviewed          Results for MILDRED WALTER (MRN [id number]) as of 9/7/2019 16:27   Ref. Range 7/15/2019 12:40   WBC Latest Ref Range: 4.8 - 10.8 K/uL 6.6   RBC Latest Ref Range: 4.20 - 5.40 M/uL 5.60 (H)   Hemoglobin Latest Ref Range: " 12.0 - 16.0 g/dL 16.5 (H)   Hematocrit Latest Ref Range: 37.0 - 47.0 % 51.0 (H)   MCV Latest Ref Range: 81.4 - 97.8 fL 91.1   MCH Latest Ref Range: 27.0 - 33.0 pg 29.5   MCHC Latest Ref Range: 33.6 - 35.0 g/dL 32.4 (L)   RDW Latest Ref Range: 35.9 - 50.0 fL 40.6   Platelet Count Latest Ref Range: 164 - 446 K/uL 269   MPV Latest Ref Range: 9.0 - 12.9 fL 11.5   Neutrophils-Polys Latest Ref Range: 44.00 - 72.00 % 57.00   Neutrophils (Absolute) Latest Ref Range: 2.00 - 7.15 K/uL 3.77   Lymphocytes Latest Ref Range: 22.00 - 41.00 % 34.90   Lymphs (Absolute) Latest Ref Range: 1.00 - 4.80 K/uL 2.31   Monocytes Latest Ref Range: 0.00 - 13.40 % 5.30   Monos (Absolute) Latest Ref Range: 0.00 - 0.85 K/uL 0.35   Eosinophils Latest Ref Range: 0.00 - 6.90 % 1.50   Eos (Absolute) Latest Ref Range: 0.00 - 0.51 K/uL 0.10   Basophils Latest Ref Range: 0.00 - 1.80 % 1.10   Baso (Absolute) Latest Ref Range: 0.00 - 0.12 K/uL 0.07   Immature Granulocytes Latest Ref Range: 0.00 - 0.90 % 0.20   Immature Granulocytes (abs) Latest Ref Range: 0.00 - 0.11 K/uL 0.01   Nucleated RBC Latest Units: /100 WBC 0.00   NRBC (Absolute) Latest Units: K/uL 0.00   Sodium Latest Ref Range: 135 - 145 mmol/L 140   Potassium Latest Ref Range: 3.6 - 5.5 mmol/L 4.2   Chloride Latest Ref Range: 96 - 112 mmol/L 107   Co2 Latest Ref Range: 20 - 33 mmol/L 23   Anion Gap Latest Ref Range: 0.0 - 11.9  10.0   Glucose Latest Ref Range: 65 - 99 mg/dL 81   Bun Latest Ref Range: 8 - 22 mg/dL 17   Creatinine Latest Ref Range: 0.50 - 1.40 mg/dL 0.77   GFR If  Latest Ref Range: >60 mL/min/1.73 m 2 >60   GFR If Non  Latest Ref Range: >60 mL/min/1.73 m 2 >60   Calcium Latest Ref Range: 8.5 - 10.5 mg/dL 9.5   AST(SGOT) Latest Ref Range: 12 - 45 U/L 21   ALT(SGPT) Latest Ref Range: 2 - 50 U/L 35   Alkaline Phosphatase Latest Ref Range: 30 - 99 U/L 64   Total Bilirubin Latest Ref Range: 0.1 - 1.5 mg/dL 0.6   Albumin Latest Ref Range: 3.2 - 4.9 g/dL  4.5   Total Protein Latest Ref Range: 6.0 - 8.2 g/dL 7.1   Globulin Latest Ref Range: 1.9 - 3.5 g/dL 2.6   A-G Ratio Latest Units: g/dL 1.7   Glycohemoglobin Latest Ref Range: 0.0 - 5.6 % 5.4   Estim. Avg Glu Latest Units: mg/dL 108   Cholesterol,Tot Latest Ref Range: 100 - 199 mg/dL 211 (H)   Triglycerides Latest Ref Range: 0 - 149 mg/dL 166 (H)   HDL Latest Ref Range: >=40 mg/dL 41   LDL Latest Ref Range: <100 mg/dL 137 (H)     Results for MILDRED WALTER (MRN [id number]) as of 9/7/2019 16:27   Ref. Range 1/21/2019 13:12   TSH Latest Ref Range: 0.380 - 5.330 uIU/mL 1.430   Free T-4 Latest Ref Range: 0.53 - 1.43 ng/dL 0.76     Strengths/Assets:  Patient strengths identified included self-awareness, family support, social skills, hopeful for future     Impression:  ADHD, predominantly inattentive type  Anxiety    Plan:  1. Medication options, alternatives (including no medications) and medication risks/benefits/side effects were discussed in detail.   2. Decrease Celexa to 10mg po q day per patient request.     3. Increase Adderall to Adderall XR 25 mg q am for improving ADHD symptoms.  #19-5 mg tabs ordered to combine with 20 mg tab for 19 days.  Common side effects of stimulants including anxiety, headache, loss of appetite, trouble sleeping, weight loss, HTN, exacerbation of tics, overstimulation, tremor, and dizziness were explained to patient, verbalized understanding.  4. Smoking cessation given.   5. The patient was advised to call, message provider on [email or web address], or come in to the clinic if symptoms worsen or if any future questions/issues regarding their medications arise; the patient verbalized understanding and agreement.    6. The patient was educated to call 911, call the suicide hotline, or go to local ER if having thoughts of suicide or homicide; verbalized understanding.    Return to clinic in 2 weeks or sooner if symptoms worsen    The proposed treatment plan was discussed with the patient who was  provided the opportunity to ask questions and make suggestions regarding alternative treatment. Patient verbalized understanding and expressed agreement with the plan.     Total face-to-face time: 30 minutes with more than 50% of face-to-face time spent in counseling and coordinating care as stated in the above plan.    Adalberto Kimball A.P.R.N.  09/07/19    This note was created using voice recognition software (Dragon). The accuracy of the dictation is limited by the abilities of the software. I have reviewed the note prior to signing, however some errors in grammar and context are still possible. If you have any questions related to this note please do not hesitate to contact our office.

## 2019-09-10 ENCOUNTER — OFFICE VISIT (OUTPATIENT)
Dept: BEHAVIORAL HEALTH | Facility: CLINIC | Age: 54
End: 2019-09-10
Payer: MEDICARE

## 2019-09-10 VITALS
SYSTOLIC BLOOD PRESSURE: 141 MMHG | BODY MASS INDEX: 28.34 KG/M2 | DIASTOLIC BLOOD PRESSURE: 94 MMHG | RESPIRATION RATE: 16 BRPM | WEIGHT: 187 LBS | HEIGHT: 68 IN | HEART RATE: 84 BPM

## 2019-09-10 DIAGNOSIS — F41.9 ANXIETY: ICD-10-CM

## 2019-09-10 DIAGNOSIS — F90.0 ADHD (ATTENTION DEFICIT HYPERACTIVITY DISORDER), INATTENTIVE TYPE: ICD-10-CM

## 2019-09-10 PROCEDURE — 99214 OFFICE O/P EST MOD 30 MIN: CPT | Performed by: NURSE PRACTITIONER

## 2019-09-10 RX ORDER — DEXTROAMPHETAMINE SACCHARATE, AMPHETAMINE ASPARTATE MONOHYDRATE, DEXTROAMPHETAMINE SULFATE AND AMPHETAMINE SULFATE 5; 5; 5; 5 MG/1; MG/1; MG/1; MG/1
20 CAPSULE, EXTENDED RELEASE ORAL EVERY MORNING
Qty: 30 CAP | Refills: 0 | Status: SHIPPED | OUTPATIENT
Start: 2019-09-10 | End: 2019-09-24

## 2019-09-10 RX ORDER — DEXTROAMPHETAMINE SACCHARATE, AMPHETAMINE ASPARTATE MONOHYDRATE, DEXTROAMPHETAMINE SULFATE AND AMPHETAMINE SULFATE 1.25; 1.25; 1.25; 1.25 MG/1; MG/1; MG/1; MG/1
5 CAPSULE, EXTENDED RELEASE ORAL EVERY MORNING
Qty: 19 CAP | Refills: 0 | Status: SHIPPED | OUTPATIENT
Start: 2019-09-10 | End: 2019-09-24

## 2019-09-10 ASSESSMENT — PATIENT HEALTH QUESTIONNAIRE - PHQ9: CLINICAL INTERPRETATION OF PHQ2 SCORE: 0

## 2019-09-19 NOTE — PROGRESS NOTES
"PSYCHIATRY FOLLOW-UP NOTE     Chief Complaint:     \" I am not doing great.\"     History Of Present Illness:  Jessica Davis is a 53 y.o. female with depression/anxiety/ADHD comes in today for follow up.     The patient notes that she did not tolerate decreasing the Celexa very well.  She tried this for several days and felt \"like there was a bag of sand on me.\"  She felt \"foggy and heavy.\"  She noted that her irritability increased with this wean, and she was quick to anger.  She denies panic attacks.  Because of this, 3 days ago she increased her dose back up to 20 mg a day and is feeling mildly better since.    The patient's mood also suffered with the decrease of Celexa.  She became more depressed, experiencing anergia, low motivation, and anhedonia.  She denies suicidal ideations, passive or active.  Again, her mood improved with increasing the Celexa back up to 20 mg daily.  Despite this, she requests to get off this medication.  She divulges to this writer that she has had \"0 sex drive for the last 20 years,\" since being on Celexa.  She feels it is time to get off this medication for her 's sake.  She requests a medication change, to come off of Celexa and onto a different antidepressant that will help her irritability but not give her sexual side effects.    Despite this, she felt improvement in her ADHD symptoms with the increased Adderall.  She is feeling more organized, not procrastinating as much, and is seeing tasks through.  She denies side effects to this medication, but still feels there is improvement to be made.  She requests to go up to the max dosing of 30 mg a day today. The patient denies symptoms of hypomania, psychosis or homicidal ideations.      Current psychiatric medications   Celexa 20mg po q day  Adderall XR 25mg po q am     Previous medication trials:   Prozac, Valium after a panic attack, a few others cannot remember their names      Past Psychiatric History:   Past " "prescribing provider(s): Denies  Prior psychiatric hospitalization: Denies  Hx of self-harm/suicide attempt: Denies  Hx of psychotherapy: Marital counseling before when her children were young.     Social History (changes since last visit):    29 years currently.  Has 3 grown kids, all in the area. Lives with  and 95-year-old mother with dementia. Not working currently because of this.       Substance Use:  Alcohol: rare, if any   Nicotine: current smoker  Illicit drugs: Denies.   Caffeine: 2 cups of coffee/day     Depression Screening (PHQ-9 Score):  Depression Screen (PHQ-2/PHQ-9) 6/14/2018 1/15/2019 3/12/2019   PHQ-2 Total Score 0 0 2   PHQ-9 Total Score - - 9      Interpretation of PHQ-9 Total Score   Score Severity   1-4 No Depression   5-9 Mild Depression   10-14 Moderate Depression   15-19 Moderately Severe Depression   20-27 Severe Depression     Physical Examination:      9/24/19 11:04 AM      BP  148/99     Pulse  89     Resp  16     Weight   83.5 kg (184 lb)     Height  1.727 m (5' 8\")      Review of Symptoms:   Constitutional: denies recent chills, fevers.     Neuro: denies recent weakness, numbness, or headaches.   HEENT: denies recent nasal congestion or sore throat.  Cardiovascular: Hx of dyslipidemia. HTN.  Respiratory: denies recent shortness of breath, dyspnea, or cough.  Hx of sleep apnea.  Smoking again.  GI: denies recent nausea, vomiting, or diarrhea.  : denies recent urinary urgency frequency or urgency.  Hx of HRT  Musculoskeletal: age-appropriate gait and station, good balance. No abnormal movements noted.   Skin: hx of eczema   Endocrine: denies recent cold and heat intolerance, denies excessive thirst.   Hematologic: denies recent abnormal bleeding and bruising easily.  Psychiatric: See HPI     Mental Status Examination:   Appearance: 53 y.o.  female, dressed in casual attire, long gray hair, nose ring, grooming and hygiene appropriate, appropriate to weather, " "neat,  Behavior: calm and cooperative, good eye contact, no psychomotor agitation or retardation noted  Participation: active verbal participation, engaged  Speech: spontaneous, regular rate, rhythm, and volume noted.  Language appropriate.  Mood: \" Irritable.\"  Affect: Anxious and mood congruent  Orientation: alert and oriented to person, place, situation, and time.  Attention/concentration: Intact.   Thought Process: linear, logical, and goal-directed  Thought Content: denies passive/active suicidal or homicidal ideations, intent, or plan, denies homicidal ideations  Perception: denies auditory or visual hallucinations, no delusions noted  Fund of knowledge and vocabulary: Adequate.  Memory: Recent memory limited, Remote memory adequate  Judgment: Fair.  Insight: Fair     Medical Records/Labs/Diagnostic Tests Reviewed:   records reviewed, recent relevant provider encounters reviewed, recent relevant labs in record reviewed.  Medications reviewed.     Results for MILDRED WALTER (MRN [id number]) as of 9/7/2019 16:27    Ref. Range 7/15/2019 12:40   WBC Latest Ref Range: 4.8 - 10.8 K/uL 6.6   RBC Latest Ref Range: 4.20 - 5.40 M/uL 5.60 (H)   Hemoglobin Latest Ref Range: 12.0 - 16.0 g/dL 16.5 (H)   Hematocrit Latest Ref Range: 37.0 - 47.0 % 51.0 (H)   MCV Latest Ref Range: 81.4 - 97.8 fL 91.1   MCH Latest Ref Range: 27.0 - 33.0 pg 29.5   MCHC Latest Ref Range: 33.6 - 35.0 g/dL 32.4 (L)   RDW Latest Ref Range: 35.9 - 50.0 fL 40.6   Platelet Count Latest Ref Range: 164 - 446 K/uL 269   MPV Latest Ref Range: 9.0 - 12.9 fL 11.5   Neutrophils-Polys Latest Ref Range: 44.00 - 72.00 % 57.00   Neutrophils (Absolute) Latest Ref Range: 2.00 - 7.15 K/uL 3.77   Lymphocytes Latest Ref Range: 22.00 - 41.00 % 34.90   Lymphs (Absolute) Latest Ref Range: 1.00 - 4.80 K/uL 2.31   Monocytes Latest Ref Range: 0.00 - 13.40 % 5.30   Monos (Absolute) Latest Ref Range: 0.00 - 0.85 K/uL 0.35   Eosinophils Latest Ref Range: 0.00 - 6.90 % " 1.50   Eos (Absolute) Latest Ref Range: 0.00 - 0.51 K/uL 0.10   Basophils Latest Ref Range: 0.00 - 1.80 % 1.10   Baso (Absolute) Latest Ref Range: 0.00 - 0.12 K/uL 0.07   Immature Granulocytes Latest Ref Range: 0.00 - 0.90 % 0.20   Immature Granulocytes (abs) Latest Ref Range: 0.00 - 0.11 K/uL 0.01   Nucleated RBC Latest Units: /100 WBC 0.00   NRBC (Absolute) Latest Units: K/uL 0.00   Sodium Latest Ref Range: 135 - 145 mmol/L 140   Potassium Latest Ref Range: 3.6 - 5.5 mmol/L 4.2   Chloride Latest Ref Range: 96 - 112 mmol/L 107   Co2 Latest Ref Range: 20 - 33 mmol/L 23   Anion Gap Latest Ref Range: 0.0 - 11.9  10.0   Glucose Latest Ref Range: 65 - 99 mg/dL 81   Bun Latest Ref Range: 8 - 22 mg/dL 17   Creatinine Latest Ref Range: 0.50 - 1.40 mg/dL 0.77   GFR If  Latest Ref Range: >60 mL/min/1.73 m 2 >60   GFR If Non  Latest Ref Range: >60 mL/min/1.73 m 2 >60   Calcium Latest Ref Range: 8.5 - 10.5 mg/dL 9.5   AST(SGOT) Latest Ref Range: 12 - 45 U/L 21   ALT(SGPT) Latest Ref Range: 2 - 50 U/L 35   Alkaline Phosphatase Latest Ref Range: 30 - 99 U/L 64   Total Bilirubin Latest Ref Range: 0.1 - 1.5 mg/dL 0.6   Albumin Latest Ref Range: 3.2 - 4.9 g/dL 4.5   Total Protein Latest Ref Range: 6.0 - 8.2 g/dL 7.1   Globulin Latest Ref Range: 1.9 - 3.5 g/dL 2.6   A-G Ratio Latest Units: g/dL 1.7   Glycohemoglobin Latest Ref Range: 0.0 - 5.6 % 5.4   Estim. Avg Glu Latest Units: mg/dL 108   Cholesterol,Tot Latest Ref Range: 100 - 199 mg/dL 211 (H)   Triglycerides Latest Ref Range: 0 - 149 mg/dL 166 (H)   HDL Latest Ref Range: >=40 mg/dL 41   LDL Latest Ref Range: <100 mg/dL 137 (H)      Results for MILDRED WALTER (MRN [id number]) as of 9/7/2019 16:27    Ref. Range 1/21/2019 13:12   TSH Latest Ref Range: 0.380 - 5.330 uIU/mL 1.430   Free T-4 Latest Ref Range: 0.53 - 1.43 ng/dL 0.76      Strengths/Assets:  Patient strengths identified included self-awareness, family support, social skills, hopeful  for future     Impression:  ADHD, predominantly inattentive type  Anxiety     Plan:  1. Medication options, alternatives (including no medications) and medication risks/benefits/side effects were discussed in detail.   2. Stop Celexa per patient request.  Decrease Celexa to 10mg po q day x3 days, 5 mg p.o. daily x 3 days then stop for 2 days.  After wean, start Lexapro 5 mg p.o. daily for anxiety/irritability. Discussed SSRIs' 4-6 week period to assess for efficacy. Discussed side effects including nausea/vomiting, abdominal discomfort/pain, diarrhea, headaches, drowsiness, sleep disturbances, initial transient worsening of anxiety, agitation, hypertension, fatigue, sexual dysfunction, and risk for suicidal ideations.  Verbalized understanding.  3. Increase Adderall to Adderall XR 30 mg q am for improving ADHD symptoms.  #30, first fill 9.26, r0.  Common side effects of stimulants including anxiety, headache, loss of appetite, trouble sleeping, weight loss, HTN, exacerbation of tics, overstimulation, tremor, and dizziness were explained to patient, verbalized understanding.  4. Start Vistaril 50mg po BID PRN anxiety/sleep to help with antidepressant wean and titration.  Discussed potential for side effects including dry mouth, sedation, weight gain, and respiratory depression.  Verbalized understanding.   5. The patient was advised to call, message provider on [email or web address], or come in to the clinic if symptoms worsen or if any future questions/issues regarding their medications arise; the patient verbalized understanding and agreement.    6. The patient was educated to call 911, call the suicide hotline, or go to local ER if having thoughts of suicide or homicide; verbalized understanding.     Return to clinic in  2 weeks or sooner if symptoms worsen     The proposed treatment plan was discussed with the patient who was provided the opportunity to ask questions and make suggestions regarding alternative treatment.  Patient verbalized understanding and expressed agreement with the plan.      Total face-to-face time: 30 minutes with more than 50% of face-to-face time spent in counseling and coordinating care as stated in the above plan.     Adalberto Kimball A.P.R.N.  09/07/19     This note was created using voice recognition software (Dragon). The accuracy of the dictation is limited by the abilities of the software. I have reviewed the note prior to signing, however some errors in grammar and context are still possible. If you have any questions related to this note please do not hesitate to contact our office.

## 2019-09-24 ENCOUNTER — OFFICE VISIT (OUTPATIENT)
Dept: BEHAVIORAL HEALTH | Facility: CLINIC | Age: 54
End: 2019-09-24
Payer: MEDICARE

## 2019-09-24 VITALS
DIASTOLIC BLOOD PRESSURE: 99 MMHG | WEIGHT: 184 LBS | RESPIRATION RATE: 16 BRPM | SYSTOLIC BLOOD PRESSURE: 148 MMHG | HEART RATE: 89 BPM | BODY MASS INDEX: 27.89 KG/M2 | HEIGHT: 68 IN

## 2019-09-24 DIAGNOSIS — F41.9 ANXIETY: ICD-10-CM

## 2019-09-24 DIAGNOSIS — F90.0 ADHD (ATTENTION DEFICIT HYPERACTIVITY DISORDER), INATTENTIVE TYPE: ICD-10-CM

## 2019-09-24 PROCEDURE — 99214 OFFICE O/P EST MOD 30 MIN: CPT | Performed by: NURSE PRACTITIONER

## 2019-09-24 RX ORDER — HYDROXYZINE PAMOATE 50 MG/1
50 CAPSULE ORAL 2 TIMES DAILY PRN
Qty: 60 CAP | Refills: 0 | Status: SHIPPED | OUTPATIENT
Start: 2019-09-24 | End: 2019-10-22 | Stop reason: SDUPTHER

## 2019-09-24 RX ORDER — ESCITALOPRAM OXALATE 5 MG/1
5 TABLET ORAL DAILY
Qty: 30 TAB | Refills: 0 | Status: SHIPPED | OUTPATIENT
Start: 2019-09-24 | End: 2019-10-22 | Stop reason: SDUPTHER

## 2019-09-24 RX ORDER — DEXTROAMPHETAMINE SACCHARATE, AMPHETAMINE ASPARTATE MONOHYDRATE, DEXTROAMPHETAMINE SULFATE AND AMPHETAMINE SULFATE 7.5; 7.5; 7.5; 7.5 MG/1; MG/1; MG/1; MG/1
30 CAPSULE, EXTENDED RELEASE ORAL EVERY MORNING
Qty: 30 CAP | Refills: 0 | Status: SHIPPED | OUTPATIENT
Start: 2019-09-26 | End: 2019-10-22 | Stop reason: SDUPTHER

## 2019-10-02 NOTE — PROGRESS NOTES
"PSYCHIATRY FOLLOW-UP NOTE    Chief Complaint:    \"***\"    History Of Present Illness:  Jessica Davis is a 53 y.o. female with depression/anxiety/ADHD comes in today for follow up.     The patient notes that she did not tolerate decreasing the Celexa very well.  She tried this for several days and felt \"like there was a bag of sand on me.\"  She felt \"foggy and heavy.\"  She noted that her irritability increased with this wean, and she was quick to anger.  She denies panic attacks.  Because of this, 3 days ago she increased her dose back up to 20 mg a day and is feeling mildly better since.     The patient's mood also suffered with the decrease of Celexa.  She became more depressed, experiencing anergia, low motivation, and anhedonia.  She denies suicidal ideations, passive or active.  Again, her mood improved with increasing the Celexa back up to 20 mg daily.  Despite this, she requests to get off this medication.  She divulges to this writer that she has had \"0 sex drive for the last 20 years,\" since being on Celexa.  She feels it is time to get off this medication for her 's sake.  She requests a medication change, to come off of Celexa and onto a different antidepressant that will help her irritability but not give her sexual side effects.     Despite this, she felt improvement in her ADHD symptoms with the increased Adderall.  She is feeling more organized, not procrastinating as much, and is seeing tasks through.  She denies side effects to this medication, but still feels there is improvement to be made.  She requests to go up to the max dosing of 30 mg a day today. The patient denies symptoms of hypomania, psychosis or homicidal ideations.      Current psychiatric medications   Celexa 20mg po q day???  Vistaril 50 mg p.o. twice daily as needed  Adderall XR 25mg po q am     Previous medication trials:   Prozac, Valium after a panic attack, a few others cannot remember their names      Past " "Psychiatric History:   Past prescribing provider(s): Denies  Prior psychiatric hospitalization: Denies  Hx of self-harm/suicide attempt: Denies  Hx of psychotherapy: Marital counseling before when her children were young.     Social History (changes since last visit):    29 years currently.  Has 3 grown kids, all in the area. Lives with  and 95-year-old mother with dementia. Not working currently because of this.       Substance Use:  Alcohol: rare, if any   Nicotine: current smoker  Illicit drugs: Denies.   Caffeine: 2 cups of coffee/day     Depression Screening (PHQ-9 Score):  Depression Screen (PHQ-2/PHQ-9) 6/14/2018 1/15/2019 3/12/2019   PHQ-2 Total Score 0 0 2   PHQ-9 Total Score - - 9      Interpretation of PHQ-9 Total Score   Score Severity   1-4 No Depression   5-9 Mild Depression   10-14 Moderate Depression   15-19 Moderately Severe Depression   20-27 Severe Depression     Physical Examination:       9/24/19 11:04 AM         BP   148/99      Pulse   89      Resp   16      Weight    83.5 kg (184 lb)      Height   1.727 m (5' 8\")       Review of Symptoms:   Constitutional: denies recent chills, fevers.     Neuro: denies recent weakness, numbness, or headaches.   HEENT: denies recent nasal congestion or sore throat.  Cardiovascular: Hx of dyslipidemia. HTN.  Respiratory: denies recent shortness of breath, dyspnea, or cough.  Hx of sleep apnea.  Smoking again.  GI: denies recent nausea, vomiting, or diarrhea.  : denies recent urinary urgency frequency or urgency.  Hx of HRT  Musculoskeletal: age-appropriate gait and station, good balance. No abnormal movements noted.   Skin: hx of eczema   Endocrine: denies recent cold and heat intolerance, denies excessive thirst.   Hematologic: denies recent abnormal bleeding and bruising easily.  Psychiatric: See HPI     Mental Status Examination:   Appearance: 53 y.o.  female, dressed in casual attire, long gray hair, nose ring, grooming and hygiene " "appropriate, appropriate to weather, neat,  Behavior: calm and cooperative, good eye contact, no psychomotor agitation or retardation noted  Participation: active verbal participation, engaged  Speech: spontaneous, regular rate, rhythm, and volume noted.  Language appropriate.  Mood: \" Irritable.\"  Affect: Anxious and mood congruent  Orientation: alert and oriented to person, place, situation, and time.  Attention/concentration: Intact.   Thought Process: linear, logical, and goal-directed  Thought Content: denies passive/active suicidal or homicidal ideations, intent, or plan, denies homicidal ideations  Perception: denies auditory or visual hallucinations, no delusions noted  Fund of knowledge and vocabulary: Adequate.  Memory: Recent memory limited, Remote memory adequate  Judgment: Fair.  Insight: Fair     Medical Records/Labs/Diagnostic Tests Reviewed:   records reviewed, recent relevant provider encounters reviewed, recent relevant labs in record reviewed.  Medications reviewed.     Results for MILDRED WALTER (MRN [id number]) as of 9/7/2019 16:27    Ref. Range 7/15/2019 12:40   WBC Latest Ref Range: 4.8 - 10.8 K/uL 6.6   RBC Latest Ref Range: 4.20 - 5.40 M/uL 5.60 (H)   Hemoglobin Latest Ref Range: 12.0 - 16.0 g/dL 16.5 (H)   Hematocrit Latest Ref Range: 37.0 - 47.0 % 51.0 (H)   MCV Latest Ref Range: 81.4 - 97.8 fL 91.1   MCH Latest Ref Range: 27.0 - 33.0 pg 29.5   MCHC Latest Ref Range: 33.6 - 35.0 g/dL 32.4 (L)   RDW Latest Ref Range: 35.9 - 50.0 fL 40.6   Platelet Count Latest Ref Range: 164 - 446 K/uL 269   MPV Latest Ref Range: 9.0 - 12.9 fL 11.5   Neutrophils-Polys Latest Ref Range: 44.00 - 72.00 % 57.00   Neutrophils (Absolute) Latest Ref Range: 2.00 - 7.15 K/uL 3.77   Lymphocytes Latest Ref Range: 22.00 - 41.00 % 34.90   Lymphs (Absolute) Latest Ref Range: 1.00 - 4.80 K/uL 2.31   Monocytes Latest Ref Range: 0.00 - 13.40 % 5.30   Monos (Absolute) Latest Ref Range: 0.00 - 0.85 K/uL 0.35 "   Eosinophils Latest Ref Range: 0.00 - 6.90 % 1.50   Eos (Absolute) Latest Ref Range: 0.00 - 0.51 K/uL 0.10   Basophils Latest Ref Range: 0.00 - 1.80 % 1.10   Baso (Absolute) Latest Ref Range: 0.00 - 0.12 K/uL 0.07   Immature Granulocytes Latest Ref Range: 0.00 - 0.90 % 0.20   Immature Granulocytes (abs) Latest Ref Range: 0.00 - 0.11 K/uL 0.01   Nucleated RBC Latest Units: /100 WBC 0.00   NRBC (Absolute) Latest Units: K/uL 0.00   Sodium Latest Ref Range: 135 - 145 mmol/L 140   Potassium Latest Ref Range: 3.6 - 5.5 mmol/L 4.2   Chloride Latest Ref Range: 96 - 112 mmol/L 107   Co2 Latest Ref Range: 20 - 33 mmol/L 23   Anion Gap Latest Ref Range: 0.0 - 11.9  10.0   Glucose Latest Ref Range: 65 - 99 mg/dL 81   Bun Latest Ref Range: 8 - 22 mg/dL 17   Creatinine Latest Ref Range: 0.50 - 1.40 mg/dL 0.77   GFR If  Latest Ref Range: >60 mL/min/1.73 m 2 >60   GFR If Non  Latest Ref Range: >60 mL/min/1.73 m 2 >60   Calcium Latest Ref Range: 8.5 - 10.5 mg/dL 9.5   AST(SGOT) Latest Ref Range: 12 - 45 U/L 21   ALT(SGPT) Latest Ref Range: 2 - 50 U/L 35   Alkaline Phosphatase Latest Ref Range: 30 - 99 U/L 64   Total Bilirubin Latest Ref Range: 0.1 - 1.5 mg/dL 0.6   Albumin Latest Ref Range: 3.2 - 4.9 g/dL 4.5   Total Protein Latest Ref Range: 6.0 - 8.2 g/dL 7.1   Globulin Latest Ref Range: 1.9 - 3.5 g/dL 2.6   A-G Ratio Latest Units: g/dL 1.7   Glycohemoglobin Latest Ref Range: 0.0 - 5.6 % 5.4   Estim. Avg Glu Latest Units: mg/dL 108   Cholesterol,Tot Latest Ref Range: 100 - 199 mg/dL 211 (H)   Triglycerides Latest Ref Range: 0 - 149 mg/dL 166 (H)   HDL Latest Ref Range: >=40 mg/dL 41   LDL Latest Ref Range: <100 mg/dL 137 (H)      Results for MILDRED WALTER [facility] (MRN [id number]) as of 9/7/2019 16:27    Ref. Range 1/21/2019 13:12   TSH Latest Ref Range: 0.380 - 5.330 uIU/mL 1.430   Free T-4 Latest Ref Range: 0.53 - 1.43 ng/dL 0.76      Strengths/Assets:  Patient strengths identified included  self-awareness, family support, social skills, hopeful for future     Impression:  ADHD, predominantly inattentive type  Anxiety     Plan:  1. Medication options, alternatives (including no medications) and medication risks/benefits/side effects were discussed in detail.   2. Stop Celexa per patient request.  Decrease Celexa to 10mg po q day x3 days, 5 mg p.o. daily x 3 days then stop for 2 days.  After wean, start Lexapro 5 mg p.o. daily for anxiety/irritability. Discussed SSRIs' 4-6 week period to assess for efficacy. Discussed side effects including nausea/vomiting, abdominal discomfort/pain, diarrhea, headaches, drowsiness, sleep disturbances, initial transient worsening of anxiety, agitation, hypertension, fatigue, sexual dysfunction, and risk for suicidal ideations.  Verbalized understanding.  3. Increase Adderall to Adderall XR 30 mg q am for improving ADHD symptoms.  #30, first fill 9.26, r0.  Common side effects of stimulants including anxiety, headache, loss of appetite, trouble sleeping, weight loss, HTN, exacerbation of tics, overstimulation, tremor, and dizziness were explained to patient, verbalized understanding.  4. Start Vistaril 50mg po BID PRN anxiety/sleep to help with antidepressant wean and titration.  Discussed potential for side effects including dry mouth, sedation, weight gain, and respiratory depression.  Verbalized understanding.   5. The patient was advised to call, message provider on [employer], or come in to the clinic if symptoms worsen or if any future questions/issues regarding their medications arise; the patient verbalized understanding and agreement.    6. The patient was educated to call 911, call the suicide hotline, or go to local ER if having thoughts of suicide or homicide; verbalized understanding.    Return to clinic in *** or sooner if symptoms worsen    The proposed treatment plan was discussed with the patient who was provided the opportunity to ask questions and make  suggestions regarding alternative treatment. Patient verbalized understanding and expressed agreement with the plan.     Total face-to-face time: *** minutes with more than 50% of face-to-face time spent in counseling and coordinating care as stated in the above plan. {nypsychotherapy:37769}    ELY Canseco.    This note was created using voice recognition software (Dragon). The accuracy of the dictation is limited by the abilities of the software. I have reviewed the note prior to signing, however some errors in grammar and context are still possible. If you have any questions related to this note please do not hesitate to contact our office.

## 2019-10-08 ENCOUNTER — APPOINTMENT (OUTPATIENT)
Dept: BEHAVIORAL HEALTH | Facility: CLINIC | Age: 54
End: 2019-10-08
Payer: MEDICARE

## 2019-10-14 ENCOUNTER — TELEPHONE (OUTPATIENT)
Dept: MEDICAL GROUP | Facility: PHYSICIAN GROUP | Age: 54
End: 2019-10-14

## 2019-10-14 DIAGNOSIS — Z12.11 SCREENING FOR COLORECTAL CANCER: ICD-10-CM

## 2019-10-14 DIAGNOSIS — Z12.12 SCREENING FOR COLORECTAL CANCER: ICD-10-CM

## 2019-10-14 NOTE — TELEPHONE ENCOUNTER
Future Appointments       Provider Department Center    10/21/2019 3:05 PM Jackeline Quinn P.A.-C. [facility]    10/22/2019 2:00 PM [facility] [address]        ESTABLISHED PATIENT PRE-VISIT PLANNING     Patient was NOT contacted to complete PVP.     Note: Patient will not be contacted if there is no indication to call.     1.  Reviewed notes from the last few office visits within the medical group: Yes    2.  If any orders were placed at last visit or intended to be done for this visit (i.e. 6 mos follow-up), do we have Results/Consult Notes?        •  Labs - Labs ordered, NOT completed. Patient advised to complete prior to next appointment.   Note: If patient appointment is for lab review and patient did not complete labs, check with provider if OK to reschedule patient until labs completed.       •  Imaging - Imaging was not ordered at last office visit.       •  Referrals - No referrals were ordered at last office visit.    3. Is this appointment scheduled as a Hospital Follow-Up? No    4.  Immunizations were updated in [employer] using WebIZ?: Yes       •  Web Iz Recommendations: MMR , TDAP, VARICELLA (Chicken Pox)  and SHINGRIX (Shingles)    5.  Patient is due for the following Health Maintenance Topics:   Health Maintenance Due   Topic Date Due   • Annual Wellness Visit  1965   • HEPATITIS C SCREENING  1965   • IMM DTaP/Tdap/Td Vaccine (1 - Tdap) 11/21/1983   • IMM PNEUMOCOCCAL VACCINE: 0-64 Years (2 of 3 - PCV13) 05/29/2017   • COLON CANCER SCREENING ANNUAL FIT  09/05/2019       6. Orders for overdue Health Maintenance topics pended in Pre-Charting? YES    7.  AHA (MDX) form printed for Provider? No, already completed    8.  Patient was informed to arrive 15 min prior to their scheduled appointment and bring in their medication bottles.

## 2019-10-14 NOTE — TELEPHONE ENCOUNTER
Colorectal Care Gap Outreach    1. Confirmed patient is between the ages of 50-75: YES     2. Confirmed that patient IS overdue or due soon for colorectal cancer screening: YES     3. Were orders placed within the last 12 months to complete screening: NO     Phone Number Called: [phone] (home)     Call outcome: Patient would like a FIT .                                          SPECIFIC Action To Be Taken: Orders pending, please sign.    _____________________________________________________________________    Colon Cancer Screening Guidelines:     Important: If patient has any history of colon polyps or family history of colorectal cancer, FIT and Cologuard are NOT appropriate options. A colonoscopy is the recommended test for this set of patients.    • Colonoscopy  o Always recommend colonoscopy first.   o A colonoscopy is recommended over the other tests because it provides direct visualization of the colon and if there are small polyps these can also be removed with one procedure.  o If negative and no family history, could be cleared for 10 years.     • Cologuard/FIT  o Cologuard is completed once every 3 years.  o FIT is completed annually.  o If positive, Cologuard and FIT will require a diagnostic colonoscopy. Screening colonoscopies are classically covered by insurances, however, diagnostic colonoscopies may result in a bill.

## 2019-10-16 NOTE — PROGRESS NOTES
"PSYCHIATRY FOLLOW-UP NOTE    Chief Complaint:    \" I think my meds are right now.\"    History Of Present Illness:  Jessica Davis is a 53 y.o. female with depression/anxiety/ADHD comes in today for follow up.     Since her last appointment with this provider, the patient notes that she has gotten off Celexa completely.  She tried being off any antidepressant as she did not want to be reliant on medication.  However, a week after being off the Celexa, she noticed that her irritability and anxiety reemerged.  At that time, she started taking the Lexapro 5 mg p.o. daily and has been tolerating this medication well without side effects.  She notes that she does not have the sexual side effects to this medication as she did on the Celexa.  She is very pleased about this, yet hesitant to increase her dosage because of this.  Her anxiety since starting the Lexapro has decreased, as has her irritability.  She notes it is \"easier to let go of things.\"  She requests to stay on this medication at his current dosing.      She denies overt symptoms of depression, denies SIs passive or active at this time.  She has been taking Vistaril 50 mg p.o. nightly, with mild help with falling asleep.  She currently is getting 7 to 9 hours of sleep each night, but always feels groggy the next morning.  She notes that she has an upcoming sleep medicine appointment to get evaluated for sleep study, which she is dreading to go to.  She is tolerating the Vistaril without side effects and requests to stay on it at this time.    Her ADHD symptoms she feels are relieved at her current dosage of Adderall XR 30 mg p.o. daily.  She feels \"more organized\" on this amount.  She is not procrastinating, is less distracted, is less irritable, and is able to see things through on this medication.  She denies side effects and requests to stay on it at this time. The patient denies symptoms of hypomania, psychosis or homicidal ideations.      Current " "psychiatric medications   Lexapro 5 mg po q am  Adderall XR 30mg po q am  Vistaril 50 mg p.o. twice daily PRN      Previous medication trials:   Prozac, Valium after a panic attack, a few others cannot remember their names      Past Psychiatric History:   Past prescribing provider(s): Denies  Prior psychiatric hospitalization: Denies  Hx of self-harm/suicide attempt: Denies  Hx of psychotherapy: Marital counseling before when her children were young.     Social History (changes since last visit):    29 years currently.  Has 3 grown kids. Lives with  and 96-year-old mother with dementia. Not working currently       Substance Use:  Alcohol: rare, if any   Nicotine: current smoker  Illicit drugs: Denies.   Caffeine: 2 cups of coffee/day     Depression Screening (PHQ-9 Score):  Depression Screen (PHQ-2/PHQ-9) 6/14/2018 1/15/2019 3/12/2019   PHQ-2 Total Score 0 0 2   PHQ-9 Total Score - - 9      Interpretation of PHQ-9 Total Score   Score Severity   1-4 No Depression   5-9 Mild Depression   10-14 Moderate Depression   15-19 Moderately Severe Depression   20-27 Severe Depression     Physical Examination:  10/22/19 2:08 PM       BP  136/88     Pulse  82     Resp  16     Weight   83.5 kg (184 lb)     Height  1.727 m (5' 8\")       Review of Symptoms:   Constitutional: denies recent chills, fevers.     Neuro: denies recent weakness, numbness, or headaches.   HEENT: denies recent nasal congestion or sore throat.  Cardiovascular: Hx of dyslipidemia. HTN.  Respiratory: denies recent shortness of breath, dyspnea, or cough.  Hx of sleep apnea.  Current smoker.  GI: denies recent nausea, vomiting, or diarrhea.  : denies recent urinary urgency frequency or urgency.  Hx of HRT  Musculoskeletal: age-appropriate gait and station, good balance. No abnormal movements noted.   Skin: hx of eczema   Endocrine: denies recent cold and heat intolerance, denies excessive thirst.   Hematologic: denies recent abnormal bleeding and " "bruising easily.  Psychiatric: See HPI     Mental Status Examination:   Appearance: 53 y.o.  female, dressed in casual attire, long gray hair, nose ring, grooming and hygiene appropriate, appropriate to weather, neat,  Behavior: calm and cooperative, good eye contact, no psychomotor agitation or retardation noted  Participation: active verbal participation, engaged  Speech: spontaneous, regular rate, rhythm, and volume noted.  Language appropriate.  Mood: \"better.\"  Affect: euthymic and mood congruent  Orientation: alert and oriented to person, place, situation, and time.  Attention/concentration: Intact.   Thought Process: linear, logical, and goal-directed  Thought Content: denies passive/active suicidal or homicidal ideations, intent, or plan, denies homicidal ideations  Perception: denies auditory or visual hallucinations, no delusions noted  Fund of knowledge and vocabulary: Adequate.  Memory: No memory issues noted.  Judgment: Fair.  Insight: Fair     Medical Records/Labs/Diagnostic Tests Reviewed:   records reviewed, recent relevant provider encounters reviewed, recent relevant labs in record reviewed.  Medications reviewed.     Results for MILDRED WALTER (MRN [id number]) as of 10/22/2019 08:34   Ref. Range 7/15/2019 12:40   Sodium Latest Ref Range: 135 - 145 mmol/L 140   Potassium Latest Ref Range: 3.6 - 5.5 mmol/L 4.2   Chloride Latest Ref Range: 96 - 112 mmol/L 107        Results for MILDRED WALTER (MRN [id number]) as of 9/7/2019 16:27    Ref. Range 1/21/2019 13:12   TSH Latest Ref Range: 0.380 - 5.330 uIU/mL 1.430   Free T-4 Latest Ref Range: 0.53 - 1.43 ng/dL 0.76      Results for MILDRED WALTER (MRN [id number]) as of 10/22/2019 08:34   Ref. Range 7/15/2019 12:40   Cholesterol,Tot Latest Ref Range: 100 - 199 mg/dL 211 (H)   Triglycerides Latest Ref Range: 0 - 149 mg/dL 166 (H)   HDL Latest Ref Range: >=40 mg/dL 41   LDL Latest Ref Range: <100 mg/dL 137 (H) "         Strengths/Assets:  Patient strengths identified included self-awareness, family support, social skills, hopeful for future     Impression:  ADHD, predominantly inattentive type  Anxiety     Plan:  1. Medication options, alternatives (including no medications) and medication risks/benefits/side effects were discussed in detail.   2. Continue Lexapro 5 mg p.o. daily for improving anxiety/irritability. Discussed SSRIs' 4-6 week period to assess for efficacy. Discussed side effects including nausea/vomiting, abdominal discomfort/pain, diarrhea, headaches, drowsiness, sleep disturbances, initial transient worsening of anxiety, agitation, hypertension, fatigue, sexual dysfunction, and risk for suicidal ideations.  Verbalized understanding.  3. Continue Adderall XR 30 mg q am for improving ADHD symptoms.  #30, first fill 9.26, r0.  Common side effects of stimulants including anxiety, headache, loss of appetite, trouble sleeping, weight loss, HTN, exacerbation of tics, overstimulation, tremor, and dizziness were explained to patient, verbalized understanding.  4. Continue Vistaril 50mg po q hs PRN anxiety/sleep to help with sleep.  Discussed potential for side effects including dry mouth, sedation, weight gain, and respiratory depression.  Verbalized understanding.   5. The patient was advised to call, message provider on [employer], or come in to the clinic if symptoms worsen or if any future questions/issues regarding their medications arise; the patient verbalized understanding and agreement.    6. The patient was educated to call 911, call the suicide hotline, or go to local ER if having thoughts of suicide or homicide; verbalized understanding.    Return to clinic in 2 months or sooner if symptoms worsen    The proposed treatment plan was discussed with the patient who was provided the opportunity to ask questions and make suggestions regarding alternative treatment. Patient verbalized understanding and expressed  agreement with the plan.     ELY Canseco.    This note was created using voice recognition software (Dragon). The accuracy of the dictation is limited by the abilities of the software. I have reviewed the note prior to signing, however some errors in grammar and context are still possible. If you have any questions related to this note please do not hesitate to contact our office.

## 2019-10-21 ENCOUNTER — OFFICE VISIT (OUTPATIENT)
Dept: MEDICAL GROUP | Facility: PHYSICIAN GROUP | Age: 54
End: 2019-10-21
Payer: MEDICARE

## 2019-10-21 ENCOUNTER — HOSPITAL ENCOUNTER (OUTPATIENT)
Facility: MEDICAL CENTER | Age: 54
End: 2019-10-21
Attending: PHYSICIAN ASSISTANT
Payer: MEDICARE

## 2019-10-21 VITALS
WEIGHT: 181 LBS | TEMPERATURE: 98.3 F | DIASTOLIC BLOOD PRESSURE: 70 MMHG | SYSTOLIC BLOOD PRESSURE: 112 MMHG | BODY MASS INDEX: 27.43 KG/M2 | OXYGEN SATURATION: 96 % | HEIGHT: 68 IN | HEART RATE: 82 BPM

## 2019-10-21 DIAGNOSIS — I10 ESSENTIAL HYPERTENSION: ICD-10-CM

## 2019-10-21 PROCEDURE — 99213 OFFICE O/P EST LOW 20 MIN: CPT | Performed by: PHYSICIAN ASSISTANT

## 2019-10-21 PROCEDURE — 82274 ASSAY TEST FOR BLOOD FECAL: CPT

## 2019-10-21 NOTE — PROGRESS NOTES
Subjective:   Jessica Davis is a 53 y.o. female here today for follow-up on hypertension. Is an established patient of mine.    HPI:    Patient presents to the office today for follow-up regarding hypertension.  I last saw her in July.  At that time, she was having issues with elevated blood pressure readings, especially since starting on Adderall for ADHD.  I started her on losartan at that time, 50 mg daily. In August, dose was increased to 100 mg daily due to persistently elevated readings. She states that she is tolerating this well. She has not been checking recent home readings. BP today in the office is 112/70. She mentions that she's had some adjustment to her psychiatric medications recently. Was taken of citalopram (due to side effects) and now on escitalopram. Her Adderall dose was also recently increased to 30 mg.      Current medicines (including changes today)  Current Outpatient Medications   Medication Sig Dispense Refill   • escitalopram (LEXAPRO) 5 MG tablet Take 1 Tab by mouth every day. 30 Tab 0   • amphetamine-dextroamphetamine ER (ADDERALL XR) 30 MG XR capsule Take 1 Cap by mouth every morning for 30 days. 30 Cap 0   • hydrOXYzine pamoate (VISTARIL) 50 MG Cap Take 1 Cap by mouth 2 times a day as needed for Anxiety or Other (sleep). 60 Cap 0   • losartan (COZAAR) 100 MG Tab Take 1 Tab by mouth every day. 90 Tab 1   • estradiol (CLIMARA) 0.05 MG/24HR PATCH WEEKLY Apply 1 Patch to skin as directed every 7 days. 12 Patch 3   • nystatin/triamcinolone (MYCOLOG) 726846-5.1 UNIT/GM-% Cream Apply topically to affected area up to four times daily 30 g 11   • montelukast (SINGULAIR) 10 MG Tab Take 10 mg by mouth every day.     • triamcinolone acetonide (KENALOG) 0.1 % Ointment Apply  to affected area(s) 2 times a day.     • triamcinolone acetonide (KENALOG) 0.1 % Cream Apply thin layer to affected area twice a day as needed for eczema, do not use longer than 14 days at a time. 60 g 1     No current  "facility-administered medications for this visit.      She  has a past medical history of Arthritis, Gynecological disorder, Hypertension, S/P endometrial ablation (3/2/2016), Sleep apnea, Snoring, and Thickened endometrium (3/28/2016).    ROS  As per HPI.       Objective:     /70 (BP Location: Right arm, Patient Position: Sitting, BP Cuff Size: Adult)   Pulse 82   Temp 36.8 °C (98.3 °F)   Ht 1.727 m (5' 8\")   Wt 82.1 kg (181 lb)   SpO2 96%  Body mass index is 27.52 kg/m².     Physical Exam:  Constitutional: Alert, well-appearing, very pleasant, no distress.  Skin: Warm, dry, good turgor, no rashes in visible areas.  Eye: Pupils are equal and round, conjunctiva clear, lids normal.  ENMT: Lips without lesions, moist mucus membranes.      Assessment and Plan:   The following treatment plan was discussed    1. Essential hypertension  Established problem, well-controlled on losartan 100 mg daily.  Blood pressure is now at goal.  I have advised her to check home readings periodically and to let me know if she is noticing trend of elevation so that I can adjust her medication again.  Otherwise, will plan to see her back in the clinic in 6 months.      Followup: Return in about 6 months (around 4/21/2020).    Jakceline Quinn P.A.-C.             "

## 2019-10-22 ENCOUNTER — OFFICE VISIT (OUTPATIENT)
Dept: BEHAVIORAL HEALTH | Facility: CLINIC | Age: 54
End: 2019-10-22
Payer: MEDICARE

## 2019-10-22 VITALS
WEIGHT: 184 LBS | HEIGHT: 68 IN | DIASTOLIC BLOOD PRESSURE: 88 MMHG | SYSTOLIC BLOOD PRESSURE: 136 MMHG | HEART RATE: 82 BPM | BODY MASS INDEX: 27.89 KG/M2 | RESPIRATION RATE: 16 BRPM

## 2019-10-22 DIAGNOSIS — F90.0 ADHD (ATTENTION DEFICIT HYPERACTIVITY DISORDER), INATTENTIVE TYPE: ICD-10-CM

## 2019-10-22 DIAGNOSIS — Z12.11 SCREENING FOR COLORECTAL CANCER: ICD-10-CM

## 2019-10-22 DIAGNOSIS — Z12.12 SCREENING FOR COLORECTAL CANCER: ICD-10-CM

## 2019-10-22 DIAGNOSIS — F41.9 ANXIETY: ICD-10-CM

## 2019-10-22 LAB — AMBIGUOUS DTTM AMBI4: NORMAL

## 2019-10-22 PROCEDURE — 99214 OFFICE O/P EST MOD 30 MIN: CPT | Performed by: NURSE PRACTITIONER

## 2019-10-22 RX ORDER — ESCITALOPRAM OXALATE 5 MG/1
5 TABLET ORAL DAILY
Qty: 90 TAB | Refills: 0 | Status: SHIPPED | OUTPATIENT
Start: 2019-10-22

## 2019-10-22 RX ORDER — HYDROXYZINE PAMOATE 50 MG/1
50 CAPSULE ORAL NIGHTLY PRN
Qty: 90 CAP | Refills: 0 | Status: SHIPPED | OUTPATIENT
Start: 2019-10-22

## 2019-10-22 RX ORDER — DEXTROAMPHETAMINE SACCHARATE, AMPHETAMINE ASPARTATE MONOHYDRATE, DEXTROAMPHETAMINE SULFATE AND AMPHETAMINE SULFATE 7.5; 7.5; 7.5; 7.5 MG/1; MG/1; MG/1; MG/1
30 CAPSULE, EXTENDED RELEASE ORAL EVERY MORNING
Qty: 30 CAP | Refills: 0 | Status: SHIPPED | OUTPATIENT
Start: 2019-12-23 | End: 2020-01-22

## 2019-10-22 RX ORDER — DEXTROAMPHETAMINE SACCHARATE, AMPHETAMINE ASPARTATE MONOHYDRATE, DEXTROAMPHETAMINE SULFATE AND AMPHETAMINE SULFATE 7.5; 7.5; 7.5; 7.5 MG/1; MG/1; MG/1; MG/1
30 CAPSULE, EXTENDED RELEASE ORAL EVERY MORNING
Qty: 30 CAP | Refills: 0 | Status: SHIPPED | OUTPATIENT
Start: 2019-11-23 | End: 2019-12-23

## 2019-10-22 RX ORDER — DEXTROAMPHETAMINE SACCHARATE, AMPHETAMINE ASPARTATE MONOHYDRATE, DEXTROAMPHETAMINE SULFATE AND AMPHETAMINE SULFATE 7.5; 7.5; 7.5; 7.5 MG/1; MG/1; MG/1; MG/1
30 CAPSULE, EXTENDED RELEASE ORAL EVERY MORNING
Qty: 30 CAP | Refills: 0 | Status: SHIPPED | OUTPATIENT
Start: 2019-10-25 | End: 2019-11-24

## 2019-10-24 LAB — HEMOCCULT STL QL IA: NEGATIVE

## 2019-12-10 DIAGNOSIS — I10 ESSENTIAL HYPERTENSION: ICD-10-CM

## 2019-12-10 RX ORDER — LOSARTAN POTASSIUM 100 MG/1
100 TABLET ORAL DAILY
Qty: 100 TAB | Refills: 1 | Status: SHIPPED | OUTPATIENT
Start: 2019-12-10

## 2019-12-10 NOTE — TELEPHONE ENCOUNTER
Was the patient seen in the last year in this department? Yes    Does patient have an active prescription for medications requested? No     Received Request Via: Pharmacy      Pt met protocol?: Yes,  Ov 10/19 100ds

## 2019-12-10 NOTE — TELEPHONE ENCOUNTER
Refill X 6 months, sent to pharmacy.Pt. Seen in the last 6 months per protocol.   Lab Results   Component Value Date/Time    SODIUM 140 07/15/2019 12:40 PM    POTASSIUM 4.2 07/15/2019 12:40 PM    CHLORIDE 107 07/15/2019 12:40 PM    CO2 23 07/15/2019 12:40 PM    GLUCOSE 81 07/15/2019 12:40 PM    BUN 17 07/15/2019 12:40 PM    CREATININE 0.77 07/15/2019 12:40 PM

## 2020-01-21 PROBLEM — F41.8 OTHER SPECIFIED ANXIETY DISORDERS: Status: ACTIVE | Noted: 2019-01-15

## 2020-01-22 ENCOUNTER — APPOINTMENT (OUTPATIENT)
Dept: BEHAVIORAL HEALTH | Facility: CLINIC | Age: 55
End: 2020-01-22

## 2020-01-22 NOTE — PROGRESS NOTES
"PSYCHIATRY FOLLOW-UP NOTE    Chief Complaint:    \"***\"    History Of Present Illness:  Jessica Davis is a 54 y.o. female with depression/anxiety/ADHD comes in today for follow up.     Since her last appointment with this provider, the patient notes that she has gotten off Celexa completely.  She tried being off any antidepressant as she did not want to be reliant on medication.  However, a week after being off the Celexa, she noticed that her irritability and anxiety reemerged.  At that time, she started taking the Lexapro 5 mg p.o. daily and has been tolerating this medication well without side effects.  She notes that she does not have the sexual side effects to this medication as she did on the Celexa.  She is very pleased about this, yet hesitant to increase her dosage because of this.  Her anxiety since starting the Lexapro has decreased, as has her irritability.  She notes it is \"easier to let go of things.\"  She requests to stay on this medication at his current dosing.       She denies overt symptoms of depression, denies SIs passive or active at this time.  She has been taking Vistaril 50 mg p.o. nightly, with mild help with falling asleep.  She currently is getting 7 to 9 hours of sleep each night, but always feels groggy the next morning.  She notes that she has an upcoming sleep medicine appointment to get evaluated for sleep study, which she is dreading to go to.  She is tolerating the Vistaril without side effects and requests to stay on it at this time.     Her ADHD symptoms she feels are relieved at her current dosage of Adderall XR 30 mg p.o. daily.  She feels \"more organized\" on this amount.  She is not procrastinating, is less distracted, is less irritable, and is able to see things through on this medication.  She denies side effects and requests to stay on it at this time. The patient denies symptoms of hypomania, psychosis or homicidal ideations.      Current psychiatric medications   " "  Lexapro 5 mg po q am  Adderall XR 30mg po q am  Vistaril 50 mg p.o. twice daily PRN      Previous medication trials:   Prozac, Valium after a panic attack, a few others cannot remember their names      Past Psychiatric History:   Past prescribing provider(s): Denies  Prior psychiatric hospitalization: Denies  Hx of self-harm/suicide attempt: Denies  Hx of psychotherapy: Marital counseling before when her children were young.     Social History (changes since last visit):    29 years currently.  Has 3 grown kids. Lives with  and 96-year-old mother with dementia. Not working currently       Substance Use:  Alcohol: rare, if any   Nicotine: current smoker  Illicit drugs: Denies.   Caffeine: 2 cups of coffee/day     Depression Screening (PHQ-9 Score):  Depression Screen (PHQ-2/PHQ-9) 6/14/2018 1/15/2019 3/12/2019   PHQ-2 Total Score 0 0 2   PHQ-9 Total Score - - 9      Interpretation of PHQ-9 Total Score   Score Severity   1-4 No Depression   5-9 Mild Depression   10-14 Moderate Depression   15-19 Moderately Severe Depression   20-27 Severe Depression     Physical Examination:  10/22/19 2:08 PM           BP   136/88      Pulse   82      Resp   16      Weight    83.5 kg (184 lb)      Height   1.727 m (5' 8\")       Review of Symptoms:   Constitutional: denies recent chills, fevers.     Neuro: denies recent weakness, numbness, or headaches.   HEENT: denies recent nasal congestion or sore throat.  Cardiovascular: Hx of dyslipidemia. HTN.  Respiratory: denies recent shortness of breath, dyspnea, or cough.  Hx of sleep apnea.  Current smoker.  GI: denies recent nausea, vomiting, or diarrhea.  : denies recent urinary urgency frequency or urgency.  Hx of HRT  Musculoskeletal: age-appropriate gait and station, good balance. No abnormal movements noted.   Skin: hx of eczema   Endocrine: denies recent cold and heat intolerance, denies excessive thirst.   Hematologic: denies recent abnormal bleeding and bruising " "easily.  Psychiatric: See HPI     Mental Status Examination:   Appearance: 54 y.o.  female, dressed in casual attire, long gray hair, nose ring, grooming and hygiene appropriate, appropriate to weather, neat,  Behavior: calm and cooperative, good eye contact, no psychomotor agitation or retardation noted  Participation: active verbal participation, engaged  Speech: spontaneous, regular rate, rhythm, and volume noted.  Language appropriate.  Mood: \"better.\"  Affect: euthymic and mood congruent  Orientation: alert and oriented to person, place, situation, and time.  Attention/concentration: Intact.   Thought Process: linear, logical, and goal-directed  Thought Content: denies passive/active suicidal or homicidal ideations, intent, or plan, denies homicidal ideations  Perception: denies auditory or visual hallucinations, no delusions noted  Fund of knowledge and vocabulary: Adequate.  Memory: No memory issues noted.  Judgment: Fair.  Insight: Fair     Medical Records/Labs/Diagnostic Tests Reviewed:   records reviewed, recent relevant provider encounters reviewed, recent relevant labs in record reviewed.  Medications reviewed.     Results for MILDRED WALTER (MRN [id number]) as of 10/22/2019 08:34    Ref. Range 7/15/2019 12:40   Sodium Latest Ref Range: 135 - 145 mmol/L 140   Potassium Latest Ref Range: 3.6 - 5.5 mmol/L 4.2   Chloride Latest Ref Range: 96 - 112 mmol/L 107         Results for MILDRED WALTER (MRN [id number]) as of 9/7/2019 16:27    Ref. Range 1/21/2019 13:12   TSH Latest Ref Range: 0.380 - 5.330 uIU/mL 1.430   Free T-4 Latest Ref Range: 0.53 - 1.43 ng/dL 0.76      Results for MILDRED WALTER (MRN [id number]) as of 10/22/2019 08:34    Ref. Range 7/15/2019 12:40   Cholesterol,Tot Latest Ref Range: 100 - 199 mg/dL 211 (H)   Triglycerides Latest Ref Range: 0 - 149 mg/dL 166 (H)   HDL Latest Ref Range: >=40 mg/dL 41   LDL Latest Ref Range: <100 mg/dL 137 (H) "           Strengths/Assets:  Patient strengths identified included self-awareness, family support, social skills, hopeful for future     Impression:  ADHD, predominantly inattentive type  Anxiety disorder, unspecified     Plan:  1. Medication options, alternatives (including no medications) and medication risks/benefits/side effects were discussed in detail.   2. Continue Lexapro 5 mg p.o. daily for improving anxiety/irritability. Discussed SSRIs' 4-6 week period to assess for efficacy. Discussed side effects including nausea/vomiting, abdominal discomfort/pain, diarrhea, headaches, drowsiness, sleep disturbances, initial transient worsening of anxiety, agitation, hypertension, fatigue, sexual dysfunction, and risk for suicidal ideations.  Verbalized understanding.  3. Continue Adderall XR 30 mg q am for improving ADHD symptoms.  #30, r0.  Common side effects of stimulants including anxiety, headache, loss of appetite, trouble sleeping, weight loss, HTN, exacerbation of tics, overstimulation, tremor, and dizziness were explained to patient, verbalized understanding.  4. Continue Vistaril 50mg po q hs PRN anxiety/sleep to help with sleep.  Discussed potential for side effects including dry mouth, sedation, weight gain, and respiratory depression.  Verbalized understanding.   5. The patient was advised to call, message provider on [employer], or come in to the clinic if symptoms worsen or if any future questions/issues regarding their medications arise; the patient verbalized understanding and agreement.    6. The patient was educated to call 911, call the suicide hotline, or go to local ER if having thoughts of suicide or homicide; verbalized understanding.    Return to clinic in *** or sooner if symptoms worsen    The proposed treatment plan was discussed with the patient who was provided the opportunity to ask questions and make suggestions regarding alternative treatment. Patient verbalized understanding and  expressed agreement with the plan.     [id number]}    LAUREN [email or web address].    This note was created using voice recognition software (Dragon). The accuracy of the dictation is limited by the abilities of the software. I have reviewed the note prior to signing, however some errors in grammar and context are still possible. If you have any questions related to this note please do not hesitate to contact our office.

## 2020-03-05 ENCOUNTER — APPOINTMENT (RX ONLY)
Dept: URBAN - METROPOLITAN AREA CLINIC 4 | Facility: CLINIC | Age: 55
Setting detail: DERMATOLOGY
End: 2020-03-05

## 2020-03-05 DIAGNOSIS — L81.4 OTHER MELANIN HYPERPIGMENTATION: ICD-10-CM

## 2020-03-05 DIAGNOSIS — Z71.89 OTHER SPECIFIED COUNSELING: ICD-10-CM

## 2020-03-05 DIAGNOSIS — D22 MELANOCYTIC NEVI: ICD-10-CM

## 2020-03-05 DIAGNOSIS — L259 CONTACT DERMATITIS AND OTHER ECZEMA, UNSPECIFIED CAUSE: ICD-10-CM

## 2020-03-05 DIAGNOSIS — L70.8 OTHER ACNE: ICD-10-CM

## 2020-03-05 DIAGNOSIS — L82.1 OTHER SEBORRHEIC KERATOSIS: ICD-10-CM

## 2020-03-05 DIAGNOSIS — D18.0 HEMANGIOMA: ICD-10-CM

## 2020-03-05 PROBLEM — D22.71 MELANOCYTIC NEVI OF RIGHT LOWER LIMB, INCLUDING HIP: Status: ACTIVE | Noted: 2020-03-05

## 2020-03-05 PROBLEM — D22.5 MELANOCYTIC NEVI OF TRUNK: Status: ACTIVE | Noted: 2020-03-05

## 2020-03-05 PROBLEM — D22.72 MELANOCYTIC NEVI OF LEFT LOWER LIMB, INCLUDING HIP: Status: ACTIVE | Noted: 2020-03-05

## 2020-03-05 PROBLEM — L30.8 OTHER SPECIFIED DERMATITIS: Status: ACTIVE | Noted: 2020-03-05

## 2020-03-05 PROBLEM — D22.61 MELANOCYTIC NEVI OF RIGHT UPPER LIMB, INCLUDING SHOULDER: Status: ACTIVE | Noted: 2020-03-05

## 2020-03-05 PROBLEM — D22.62 MELANOCYTIC NEVI OF LEFT UPPER LIMB, INCLUDING SHOULDER: Status: ACTIVE | Noted: 2020-03-05

## 2020-03-05 PROBLEM — D18.01 HEMANGIOMA OF SKIN AND SUBCUTANEOUS TISSUE: Status: ACTIVE | Noted: 2020-03-05

## 2020-03-05 PROCEDURE — ? ADDITIONAL NOTES

## 2020-03-05 PROCEDURE — ? COUNSELING

## 2020-03-05 PROCEDURE — ? PRESCRIPTION

## 2020-03-05 PROCEDURE — 99214 OFFICE O/P EST MOD 30 MIN: CPT

## 2020-03-05 RX ORDER — METRONIDAZOLE 7.5 MG/G
CREAM TOPICAL
Qty: 1 | Refills: 3 | Status: ERX | COMMUNITY
Start: 2020-03-05

## 2020-03-05 RX ORDER — TRIAMCINOLONE ACETONIDE 1 MG/G
CREAM TOPICAL BID
Qty: 1 | Refills: 2 | Status: ERX | COMMUNITY
Start: 2020-03-05

## 2020-03-05 RX ADMIN — METRONIDAZOLE 1: 7.5 CREAM TOPICAL at 00:00

## 2020-03-05 RX ADMIN — TRIAMCINOLONE ACETONIDE 1: 1 CREAM TOPICAL at 00:00

## 2020-03-05 ASSESSMENT — LOCATION SIMPLE DESCRIPTION DERM
LOCATION SIMPLE: LEFT UPPER ARM
LOCATION SIMPLE: UPPER BACK
LOCATION SIMPLE: CHEST
LOCATION SIMPLE: LEFT FOREARM
LOCATION SIMPLE: RIGHT UPPER ARM
LOCATION SIMPLE: CHIN
LOCATION SIMPLE: LEFT UPPER BACK
LOCATION SIMPLE: LEFT THIGH
LOCATION SIMPLE: RIGHT FOREARM
LOCATION SIMPLE: LEFT LIP
LOCATION SIMPLE: ABDOMEN
LOCATION SIMPLE: SUPERIOR FOREHEAD
LOCATION SIMPLE: RIGHT FOREHEAD
LOCATION SIMPLE: RIGHT THIGH
LOCATION SIMPLE: LEFT FOREHEAD
LOCATION SIMPLE: NOSE

## 2020-03-05 ASSESSMENT — LOCATION DETAILED DESCRIPTION DERM
LOCATION DETAILED: LEFT ANTERIOR PROXIMAL UPPER ARM
LOCATION DETAILED: LEFT LOWER CUTANEOUS LIP
LOCATION DETAILED: LOWER STERNUM
LOCATION DETAILED: RIGHT MEDIAL FOREHEAD
LOCATION DETAILED: LEFT ANTERIOR PROXIMAL THIGH
LOCATION DETAILED: NASAL TIP
LOCATION DETAILED: LEFT ANTERIOR DISTAL UPPER ARM
LOCATION DETAILED: RIGHT PROXIMAL DORSAL FOREARM
LOCATION DETAILED: LEFT CHIN
LOCATION DETAILED: EPIGASTRIC SKIN
LOCATION DETAILED: SUPERIOR MID FOREHEAD
LOCATION DETAILED: LEFT MEDIAL UPPER BACK
LOCATION DETAILED: SUPERIOR THORACIC SPINE
LOCATION DETAILED: RIGHT ANTERIOR PROXIMAL UPPER ARM
LOCATION DETAILED: LEFT PROXIMAL DORSAL FOREARM
LOCATION DETAILED: RIGHT ANTERIOR DISTAL UPPER ARM
LOCATION DETAILED: INFERIOR THORACIC SPINE
LOCATION DETAILED: STERNAL NOTCH
LOCATION DETAILED: RIGHT ANTERIOR PROXIMAL THIGH
LOCATION DETAILED: MIDDLE STERNUM
LOCATION DETAILED: LEFT SUPERIOR MEDIAL UPPER BACK
LOCATION DETAILED: LEFT INFERIOR MEDIAL FOREHEAD

## 2020-03-05 ASSESSMENT — LOCATION ZONE DERM
LOCATION ZONE: ARM
LOCATION ZONE: FACE
LOCATION ZONE: NOSE
LOCATION ZONE: TRUNK
LOCATION ZONE: LIP
LOCATION ZONE: LEG

## 2020-03-05 NOTE — HPI: EVALUATION OF SKIN LESION(S)
How Severe Are Your Spot(S)?: moderate
Have Your Spot(S) Been Treated In The Past?: has not been treated
Hpi Title: Evaluation of Skin Lesions
Additional History: Patient would like [facility].

## 2020-03-05 NOTE — PROCEDURE: ADDITIONAL NOTES
Additional Notes: Lesions of concern, clinically consistent with acne
Detail Level: Simple
Detail Level: Zone
Additional Notes: Recommend aggressive emollients use.
Additional Notes: lesions of concern clinically consistent with an acneiform eruption. Acne rosacea, hormonal acne considered.

## 2020-03-05 NOTE — PROCEDURE: MIPS QUALITY
Detail Level: Generalized
Quality 130: Documentation Of Current Medications In The Medical Record: Current Medications Documented
Quality 402: Tobacco Use And Help With Quitting Among Adolescents: Patient screened for tobacco and is a smoker AND received Cessation Counseling

## 2020-03-05 NOTE — PROCEDURE: COUNSELING
Detail Level: Zone
Detail Level: Generalized
Doxycycline Pregnancy And Lactation Text: This medication is Pregnancy Category D and not consider safe during pregnancy. It is also excreted in breast milk but is considered safe for shorter treatment courses.
Spironolactone Counseling: Patient advised regarding risks of diarrhea, abdominal pain, hyperkalemia, birth defects (for female patients), liver toxicity and renal toxicity. The patient may need blood work to monitor liver and kidney function and potassium levels while on therapy. The patient verbalized understanding of the proper use and possible adverse effects of spironolactone.  All of the patient's questions and concerns were addressed.
Benzoyl Peroxide Counseling: Patient counseled that medicine may cause skin irritation and bleach clothing.  In the event of skin irritation, the patient was advised to reduce the amount of the drug applied or use it less frequently.   The patient verbalized understanding of the proper use and possible adverse effects of benzoyl peroxide.  All of the patient's questions and concerns were addressed.
Isotretinoin Pregnancy And Lactation Text: This medication is Pregnancy Category X and is considered extremely dangerous during pregnancy. It is unknown if it is excreted in breast milk.
Azithromycin Counseling:  I discussed with the patient the risks of azithromycin including but not limited to GI upset, allergic reaction, drug rash, diarrhea, and yeast infections.
High Dose Vitamin A Counseling: Side effects reviewed, pt to contact office should one occur.
Benzoyl Peroxide Pregnancy And Lactation Text: This medication is Pregnancy Category C. It is unknown if benzoyl peroxide is excreted in breast milk.
Birth Control Pills Pregnancy And Lactation Text: This medication should be avoided if pregnant and for the first 30 days post-partum.
Tazorac Pregnancy And Lactation Text: This medication is not safe during pregnancy. It is unknown if this medication is excreted in breast milk.
Erythromycin Counseling:  I discussed with the patient the risks of erythromycin including but not limited to GI upset, allergic reaction, drug rash, diarrhea, increase in liver enzymes, and yeast infections.
Azithromycin Pregnancy And Lactation Text: This medication is considered safe during pregnancy and is also secreted in breast milk.
Topical Retinoid counseling:  Patient advised to apply a pea-sized amount only at bedtime and wait 30 minutes after washing their face before applying.  If too drying, patient may add a non-comedogenic moisturizer. The patient verbalized understanding of the proper use and possible adverse effects of retinoids.  All of the patient's questions and concerns were addressed.
High Dose Vitamin A Pregnancy And Lactation Text: High dose vitamin A therapy is contraindicated during pregnancy and breast feeding.
Spironolactone Pregnancy And Lactation Text: This medication can cause feminization of the male fetus and should be avoided during pregnancy. The active metabolite is also found in breast milk.
Topical Clindamycin Counseling: Patient counseled that this medication may cause skin irritation or allergic reactions.  In the event of skin irritation, the patient was advised to reduce the amount of the drug applied or use it less frequently.   The patient verbalized understanding of the proper use and possible adverse effects of clindamycin.  All of the patient's questions and concerns were addressed.
Dapsone Counseling: I discussed with the patient the risks of dapsone including but not limited to hemolytic anemia, agranulocytosis, rashes, methemoglobinemia, kidney failure, peripheral neuropathy, headaches, GI upset, and liver toxicity.  Patients who start dapsone require monitoring including baseline LFTs and weekly CBCs for the first month, then every month thereafter.  The patient verbalized understanding of the proper use and possible adverse effects of dapsone.  All of the patient's questions and concerns were addressed.
Bactrim Counseling:  I discussed with the patient the risks of sulfa antibiotics including but not limited to GI upset, allergic reaction, drug rash, diarrhea, dizziness, photosensitivity, and yeast infections.  Rarely, more serious reactions can occur including but not limited to aplastic anemia, agranulocytosis, methemoglobinemia, blood dyscrasias, liver or kidney failure, lung infiltrates or desquamative/blistering drug rashes.
Topical Clindamycin Pregnancy And Lactation Text: This medication is Pregnancy Category B and is considered safe during pregnancy. It is unknown if it is excreted in breast milk.
Tetracycline Counseling: Patient counseled regarding possible photosensitivity and increased risk for sunburn.  Patient instructed to avoid sunlight, if possible.  When exposed to sunlight, patients should wear protective clothing, sunglasses, and sunscreen.  The patient was instructed to call the office immediately if the following severe adverse effects occur:  hearing changes, easy bruising/bleeding, severe headache, or vision changes.  The patient verbalized understanding of the proper use and possible adverse effects of tetracycline.  All of the patient's questions and concerns were addressed. Patient understands to avoid pregnancy while on therapy due to potential birth defects.
Use Enhanced Medication Counseling?: No
Erythromycin Pregnancy And Lactation Text: This medication is Pregnancy Category B and is considered safe during pregnancy. It is also excreted in breast milk.
Dapsone Pregnancy And Lactation Text: This medication is Pregnancy Category C and is not considered safe during pregnancy or breast feeding.
Minocycline Counseling: Patient advised regarding possible photosensitivity and discoloration of the teeth, skin, lips, tongue and gums.  Patient instructed to avoid sunlight, if possible.  When exposed to sunlight, patients should wear protective clothing, sunglasses, and sunscreen.  The patient was instructed to call the office immediately if the following severe adverse effects occur:  hearing changes, easy bruising/bleeding, severe headache, or vision changes.  The patient verbalized understanding of the proper use and possible adverse effects of minocycline.  All of the patient's questions and concerns were addressed.
Topical Retinoid Pregnancy And Lactation Text: This medication is Pregnancy Category C. It is unknown if this medication is excreted in breast milk.
Minocycline Pregnancy And Lactation Text: This medication is Pregnancy Category D and not consider safe during pregnancy. It is also excreted in breast milk.
Isotretinoin Counseling: Patient should get monthly blood tests, not donate blood, not drive at night if vision affected, not share medication, and not undergo elective surgery for 6 months after tx completed. Side effects reviewed, pt to contact office should one occur.
Topical Sulfur Applications Counseling: Topical Sulfur Counseling: Patient counseled that this medication may cause skin irritation or allergic reactions.  In the event of skin irritation, the patient was advised to reduce the amount of the drug applied or use it less frequently.   The patient verbalized understanding of the proper use and possible adverse effects of topical sulfur application.  All of the patient's questions and concerns were addressed.
Bactrim Pregnancy And Lactation Text: This medication is Pregnancy Category D and is known to cause fetal risk.  It is also excreted in breast milk.
Detail Level: Simple
Birth Control Pills Counseling: Birth Control Pill Counseling: I discussed with the patient the potential side effects of OCPs including but not limited to increased risk of stroke, heart attack, thrombophlebitis, deep venous thrombosis, hepatic adenomas, breast changes, GI upset, headaches, and depression.  The patient verbalized understanding of the proper use and possible adverse effects of OCPs. All of the patient's questions and concerns were addressed.
Topical Sulfur Applications Pregnancy And Lactation Text: This medication is Pregnancy Category C and has an unknown safety profile during pregnancy. It is unknown if this topical medication is excreted in breast milk.
Tazorac Counseling:  Patient advised that medication is irritating and drying.  Patient may need to apply sparingly and wash off after an hour before eventually leaving it on overnight.  The patient verbalized understanding of the proper use and possible adverse effects of tazorac.  All of the patient's questions and concerns were addressed.
Doxycycline Counseling:  Patient counseled regarding possible photosensitivity and increased risk for sunburn.  Patient instructed to avoid sunlight, if possible.  When exposed to sunlight, patients should wear protective clothing, sunglasses, and sunscreen.  The patient was instructed to call the office immediately if the following severe adverse effects occur:  hearing changes, easy bruising/bleeding, severe headache, or vision changes.  The patient verbalized understanding of the proper use and possible adverse effects of doxycycline.  All of the patient's questions and concerns were addressed.

## 2020-04-16 ENCOUNTER — PATIENT OUTREACH (OUTPATIENT)
Dept: HEALTH INFORMATION MANAGEMENT | Facility: OTHER | Age: 55
End: 2020-04-16

## 2020-06-17 ENCOUNTER — TELEPHONE (OUTPATIENT)
Dept: OBGYN | Facility: CLINIC | Age: 55
End: 2020-06-17

## 2020-06-17 NOTE — TELEPHONE ENCOUNTER
Called patient to inform her that she needs to schedule a follow up appointment before we can refill her Rx request for Nystat cream. [facility]

## 2020-06-19 ENCOUNTER — TELEPHONE (OUTPATIENT)
Dept: OBGYN | Facility: CLINIC | Age: 55
End: 2020-06-19

## 2020-06-19 NOTE — TELEPHONE ENCOUNTER
I called patient regarding her insurance for her cream. Pt stated she is not with [employer] [facility] [employer] anymore. She is with [employer] which is with [facility]. She said she will call her Dr to give her the Rx for her cream. Will give us a call back if she wants to pay out of pocket. No further questions.

## 2020-09-15 ENCOUNTER — APPOINTMENT (RX ONLY)
Dept: URBAN - METROPOLITAN AREA CLINIC 4 | Facility: CLINIC | Age: 55
Setting detail: DERMATOLOGY
End: 2020-09-15

## 2020-09-15 DIAGNOSIS — L70.8 OTHER ACNE: ICD-10-CM | Status: IMPROVED

## 2020-09-15 DIAGNOSIS — L259 CONTACT DERMATITIS AND OTHER ECZEMA, UNSPECIFIED CAUSE: ICD-10-CM

## 2020-09-15 PROBLEM — L30.8 OTHER SPECIFIED DERMATITIS: Status: ACTIVE | Noted: 2020-09-15

## 2020-09-15 PROCEDURE — ? PRESCRIPTION

## 2020-09-15 PROCEDURE — ? ADDITIONAL NOTES

## 2020-09-15 PROCEDURE — 99213 OFFICE O/P EST LOW 20 MIN: CPT

## 2020-09-15 PROCEDURE — ? DIAGNOSIS COMMENT

## 2020-09-15 PROCEDURE — ? COUNSELING

## 2020-09-15 RX ORDER — TRIAMCINOLONE ACETONIDE 1 MG/G
OINTMENT TOPICAL AS DIRECTED
Qty: 1 | Refills: 4 | Status: ERX | COMMUNITY
Start: 2020-09-15

## 2020-09-15 RX ADMIN — TRIAMCINOLONE ACETONIDE 1: 1 OINTMENT TOPICAL at 00:00

## 2020-09-15 ASSESSMENT — LOCATION SIMPLE DESCRIPTION DERM
LOCATION SIMPLE: LEFT FOREARM
LOCATION SIMPLE: RIGHT FOREARM

## 2020-09-15 ASSESSMENT — LOCATION DETAILED DESCRIPTION DERM
LOCATION DETAILED: RIGHT PROXIMAL DORSAL FOREARM
LOCATION DETAILED: LEFT PROXIMAL DORSAL FOREARM

## 2020-09-15 ASSESSMENT — LOCATION ZONE DERM: LOCATION ZONE: ARM

## 2020-09-15 NOTE — PROCEDURE: ADDITIONAL NOTES
Detail Level: Simple
Additional Notes: Recommend dye free/fragrance free protocol  \\nEmphasized importance of emollient use \\nAvoid scratching or rubbing affected areas \\nKeep nails trim.\\n Avoid home remedies. \\n\\nPatient has relief with topical steroids, may continue as directed. Sarna antiitch lotion recommended as well.
Additional Notes: Patient well controlled on doxycycline 20mg BID

## 2020-09-15 NOTE — PROCEDURE: DIAGNOSIS COMMENT
Detail Level: Detailed
Comment: No new/active lesions or rash available today for biopsy. Patient has been having joint pain and swelling in her right 5th DIP joint. She had an evaluation by a hand specialist and told she may have psoriatic arthritis. No significant nail pitting, onycholysis, trachyonychia, or salmon patches on exam. No new correlating rash consistent with psoriasis upon exam today. Patient reports diffuse pruritis, particularly on her arms and trunk without evidence of dermatitis. Will obtain most recent lab work. If patient flares or develops any new lesions, she is to RTC as a biopsy will be helpful. No other joint pain. No other dactylitis or enthesitis reported.\\nWill continue to monitor patient

## 2020-11-03 RX ORDER — DOXYCYCLINE HYCLATE 20 MG/1
TABLET, FILM COATED ORAL
Qty: 60 | Refills: 2 | Status: ERX

## 2021-03-15 DIAGNOSIS — Z23 NEED FOR VACCINATION: ICD-10-CM

## 2021-06-22 ENCOUNTER — RX ONLY (OUTPATIENT)
Age: 56
Setting detail: RX ONLY
End: 2021-06-22

## 2021-06-22 RX ORDER — METRONIDAZOLE 7.5 MG/G
CREAM TOPICAL
Qty: 1 | Refills: 6 | Status: ERX

## 2021-07-06 RX ORDER — DOXYCYCLINE HYCLATE 20 MG/1
TABLET, FILM COATED ORAL
Qty: 60 | Refills: 2 | Status: ERX

## 2021-07-12 ENCOUNTER — APPOINTMENT (RX ONLY)
Dept: URBAN - METROPOLITAN AREA CLINIC 4 | Facility: CLINIC | Age: 56
Setting detail: DERMATOLOGY
End: 2021-07-12

## 2021-07-12 DIAGNOSIS — L70.8 OTHER ACNE: ICD-10-CM

## 2021-07-12 DIAGNOSIS — L57.0 ACTINIC KERATOSIS: ICD-10-CM

## 2021-07-12 DIAGNOSIS — L259 CONTACT DERMATITIS AND OTHER ECZEMA, UNSPECIFIED CAUSE: ICD-10-CM | Status: IMPROVED

## 2021-07-12 PROBLEM — L30.8 OTHER SPECIFIED DERMATITIS: Status: ACTIVE | Noted: 2021-07-12

## 2021-07-12 PROCEDURE — ? ADDITIONAL NOTES

## 2021-07-12 PROCEDURE — 17000 DESTRUCT PREMALG LESION: CPT

## 2021-07-12 PROCEDURE — ? PRESCRIPTION

## 2021-07-12 PROCEDURE — ? DIAGNOSIS COMMENT

## 2021-07-12 PROCEDURE — ? MEDICATION COUNSELING

## 2021-07-12 PROCEDURE — ? LIQUID NITROGEN

## 2021-07-12 PROCEDURE — ? COUNSELING

## 2021-07-12 PROCEDURE — 99214 OFFICE O/P EST MOD 30 MIN: CPT | Mod: 25

## 2021-07-12 RX ORDER — METRONIDAZOLE 7.5 MG/G
CREAM TOPICAL
Qty: 1 | Refills: 6 | Status: ERX | COMMUNITY
Start: 2021-07-12

## 2021-07-12 RX ORDER — DOXYCYCLINE HYCLATE 20 MG/1
TABLET, FILM COATED ORAL
Qty: 60 | Refills: 3 | Status: ERX | COMMUNITY
Start: 2021-07-12

## 2021-07-12 RX ADMIN — DOXYCYCLINE HYCLATE: 20 TABLET, FILM COATED ORAL at 00:00

## 2021-07-12 RX ADMIN — METRONIDAZOLE: 7.5 CREAM TOPICAL at 00:00

## 2021-07-12 ASSESSMENT — LOCATION DETAILED DESCRIPTION DERM
LOCATION DETAILED: RIGHT PROXIMAL DORSAL FOREARM
LOCATION DETAILED: LEFT PROXIMAL DORSAL FOREARM
LOCATION DETAILED: PHILTRUM

## 2021-07-12 ASSESSMENT — LOCATION SIMPLE DESCRIPTION DERM
LOCATION SIMPLE: RIGHT FOREARM
LOCATION SIMPLE: UPPER LIP
LOCATION SIMPLE: LEFT FOREARM

## 2021-07-12 ASSESSMENT — LOCATION ZONE DERM
LOCATION ZONE: LIP
LOCATION ZONE: ARM

## 2021-07-12 NOTE — PROCEDURE: MEDICATION COUNSELING
Isotretinoin Pregnancy And Lactation Text: This medication is Pregnancy Category X and is considered extremely dangerous during pregnancy. It is unknown if it is excreted in breast milk.
Clofazimine Counseling:  I discussed with the patient the risks of clofazimine including but not limited to skin and eye pigmentation, liver damage, nausea/vomiting, gastrointestinal bleeding and allergy.
Thalidomide Counseling: I discussed with the patient the risks of thalidomide including but not limited to birth defects, anxiety, weakness, chest pain, dizziness, cough and severe allergy.
Niacinamide Counseling: I recommended taking niacin or niacinamide, also know as vitamin B3, twice daily. Recent evidence suggests that taking vitamin B3 (500 mg twice daily) can reduce the risk of actinic keratoses and non-melanoma skin cancers. Side effects of vitamin B3 include flushing and headache.
Clindamycin Pregnancy And Lactation Text: This medication can be used in pregnancy if certain situations. Clindamycin is also present in breast milk.
Eucrisa Pregnancy And Lactation Text: This medication has not been assigned a Pregnancy Risk Category but animal studies failed to show danger with the topical medication. It is unknown if the medication is excreted in breast milk.
Xeljanz Counseling: I discussed with the patient the risks of Xeljanz therapy including increased risk of infection, liver issues, headache, diarrhea, or cold symptoms. Live vaccines should be avoided. They were instructed to call if they have any problems.
Topical Retinoid counseling:  Patient advised to apply a pea-sized amount only at bedtime and wait 30 minutes after washing their face before applying.  If too drying, patient may add a non-comedogenic moisturizer. The patient verbalized understanding of the proper use and possible adverse effects of retinoids.  All of the patient's questions and concerns were addressed.
Ilumya Counseling: I discussed with the patient the risks of tildrakizumab including but not limited to immunosuppression, malignancy, posterior leukoencephalopathy syndrome, and serious infections.  The patient understands that monitoring is required including a PPD at baseline and must alert us or the primary physician if symptoms of infection or other concerning signs are noted.
Libtayo Pregnancy And Lactation Text: This medication is contraindicated in pregnancy and when breast feeding.
Isotretinoin Counseling: Patient should get monthly blood tests, not donate blood, not drive at night if vision affected, not share medication, and not undergo elective surgery for 6 months after tx completed. Side effects reviewed, pt to contact office should one occur.
Sski Pregnancy And Lactation Text: This medication is Pregnancy Category D and isn't considered safe during pregnancy. It is excreted in breast milk.
Eucrisa Counseling: Patient may experience a mild burning sensation during topical application. Eucrisa is not approved in children less than 2 years of age.
Arava Pregnancy And Lactation Text: This medication is Pregnancy Category X and is absolutely contraindicated during pregnancy. It is unknown if it is excreted in breast milk.
Tetracycline Pregnancy And Lactation Text: This medication is Pregnancy Category D and not consider safe during pregnancy. It is also excreted in breast milk.
Hydroxyzine Pregnancy And Lactation Text: This medication is not safe during pregnancy and should not be taken. It is also excreted in breast milk and breast feeding isn't recommended.
Solaraze Pregnancy And Lactation Text: This medication is Pregnancy Category B and is considered safe. There is some data to suggest avoiding during the third trimester. It is unknown if this medication is excreted in breast milk.
Clindamycin Counseling: I counseled the patient regarding use of clindamycin as an antibiotic for prophylactic and/or therapeutic purposes. Clindamycin is active against numerous classes of bacteria, including skin bacteria. Side effects may include nausea, diarrhea, gastrointestinal upset, rash, hives, yeast infections, and in rare cases, colitis.
Tremfya Pregnancy And Lactation Text: The risk during pregnancy and breastfeeding is uncertain with this medication.
Humira Pregnancy And Lactation Text: This medication is Pregnancy Category B and is considered safe during pregnancy. It is unknown if this medication is excreted in breast milk.
SSKI Counseling:  I discussed with the patient the risks of SSKI including but not limited to thyroid abnormalities, metallic taste, GI upset, fever, headache, acne, arthralgias, paraesthesias, lymphadenopathy, easy bleeding, arrhythmias, and allergic reaction.
Elidel Pregnancy And Lactation Text: This medication is Pregnancy Category C. It is unknown if this medication is excreted in breast milk.
Bexarotene Pregnancy And Lactation Text: This medication is Pregnancy Category X and should not be given to women who are pregnant or may become pregnant. This medication should not be used if you are breast feeding.
Libtayo Counseling- I discussed with the patient the risks of Libtayo including but not limited to nausea, vomiting, diarrhea, and bone or muscle pain.  The patient verbalized understanding of the proper use and possible adverse effects of Libtayo.  All of the patient's questions and concerns were addressed.
Arava Counseling:  Patient counseled regarding adverse effects of Arava including but not limited to nausea, vomiting, abnormalities in liver function tests. Patients may develop mouth sores, rash, diarrhea, and abnormalities in blood counts. The patient understands that monitoring is required including LFTs and blood counts.  There is a rare possibility of scarring of the liver and lung problems that can occur when taking methotrexate. Persistent nausea, loss of appetite, pale stools, dark urine, cough, and shortness of breath should be reported immediately. Patient advised to discontinue Arava treatment and consult with a physician prior to attempting conception. The patient will have to undergo a treatment to eliminate Arava from the body prior to conception.
Tetracycline Counseling: Patient counseled regarding possible photosensitivity and increased risk for sunburn.  Patient instructed to avoid sunlight, if possible.  When exposed to sunlight, patients should wear protective clothing, sunglasses, and sunscreen.  The patient was instructed to call the office immediately if the following severe adverse effects occur:  hearing changes, easy bruising/bleeding, severe headache, or vision changes.  The patient verbalized understanding of the proper use and possible adverse effects of tetracycline.  All of the patient's questions and concerns were addressed. Patient understands to avoid pregnancy while on therapy due to potential birth defects.
Cephalexin Pregnancy And Lactation Text: This medication is Pregnancy Category B and considered safe during pregnancy.  It is also excreted in breast milk but can be used safely for shorter doses.
Tremfya Counseling: I discussed with the patient the risks of guselkumab including but not limited to immunosuppression, serious infections, worsening of inflammatory bowel disease and drug reactions.  The patient understands that monitoring is required including a PPD at baseline and must alert us or the primary physician if symptoms of infection or other concerning signs are noted.
Humira Counseling:  I discussed with the patient the risks of adalimumab including but not limited to myelosuppression, immunosuppression, autoimmune hepatitis, demyelinating diseases, lymphoma, and serious infections.  The patient understands that monitoring is required including a PPD at baseline and must alert us or the primary physician if symptoms of infection or other concerning signs are noted.
Hydroxyzine Counseling: Patient advised that the medication is sedating and not to drive a car after taking this medication.  Patient informed of potential adverse effects including but not limited to dry mouth, urinary retention, and blurry vision.  The patient verbalized understanding of the proper use and possible adverse effects of hydroxyzine.  All of the patient's questions and concerns were addressed.
Solaraze Counseling:  I discussed with the patient the risks of Solaraze including but not limited to erythema, scaling, itching, weeping, crusting, and pain.
Spironolactone Pregnancy And Lactation Text: This medication can cause feminization of the male fetus and should be avoided during pregnancy. The active metabolite is also found in breast milk.
Hydroxychloroquine Pregnancy And Lactation Text: This medication has been shown to cause fetal harm but it isn't assigned a Pregnancy Risk Category. There are small amounts excreted in breast milk.
Bexarotene Counseling:  I discussed with the patient the risks of bexarotene including but not limited to hair loss, dry lips/skin/eyes, liver abnormalities, hyperlipidemia, pancreatitis, depression/suicidal ideation, photosensitivity, drug rash/allergic reactions, hypothyroidism, anemia, leukopenia, infection, cataracts, and teratogenicity.  Patient understands that they will need regular blood tests to check lipid profile, liver function tests, white blood cell count, thyroid function tests and pregnancy test if applicable.
Cephalexin Counseling: I counseled the patient regarding use of cephalexin as an antibiotic for prophylactic and/or therapeutic purposes. Cephalexin (commonly prescribed under brand name Keflex) is a cephalosporin antibiotic which is active against numerous classes of bacteria, including most skin bacteria. Side effects may include nausea, diarrhea, gastrointestinal upset, rash, hives, yeast infections, and in rare cases, hepatitis, kidney disease, seizures, fever, confusion, neurologic symptoms, and others. Patients with severe allergies to penicillin medications are cautioned that there is about a 10% incidence of cross-reactivity with cephalosporins. When possible, patients with penicillin allergies should use alternatives to cephalosporins for antibiotic therapy.
Elidel Counseling: Patient may experience a mild burning sensation during topical application. Elidel is not approved in children less than 2 years of age. There have been case reports of hematologic and skin malignancies in patients using topical calcineurin inhibitors although causality is questionable.
Doxepin Pregnancy And Lactation Text: This medication is Pregnancy Category C and it isn't known if it is safe during pregnancy. It is also excreted in breast milk and breast feeding isn't recommended.
Hydroxychloroquine Counseling:  I discussed with the patient that a baseline ophthalmologic exam is needed at the start of therapy and every year thereafter while on therapy. A CBC may also be warranted for monitoring.  The side effects of this medication were discussed with the patient, including but not limited to agranulocytosis, aplastic anemia, seizures, rashes, retinopathy, and liver toxicity. Patient instructed to call the office should any adverse effect occur.  The patient verbalized understanding of the proper use and possible adverse effects of Plaquenil.  All the patient's questions and concerns were addressed.
Spironolactone Counseling: Patient advised regarding risks of diarrhea, abdominal pain, hyperkalemia, birth defects (for female patients), liver toxicity and renal toxicity. The patient may need blood work to monitor liver and kidney function and potassium levels while on therapy. The patient verbalized understanding of the proper use and possible adverse effects of spironolactone.  All of the patient's questions and concerns were addressed.
Acitretin Pregnancy And Lactation Text: This medication is Pregnancy Category X and should not be given to women who are pregnant or may become pregnant in the future. This medication is excreted in breast milk.
Sarecycline Counseling: Patient advised regarding possible photosensitivity and discoloration of the teeth, skin, lips, tongue and gums.  Patient instructed to avoid sunlight, if possible.  When exposed to sunlight, patients should wear protective clothing, sunglasses, and sunscreen.  The patient was instructed to call the office immediately if the following severe adverse effects occur:  hearing changes, easy bruising/bleeding, severe headache, or vision changes.  The patient verbalized understanding of the proper use and possible adverse effects of sarecycline.  All of the patient's questions and concerns were addressed.
Rhofade Pregnancy And Lactation Text: This medication has not been assigned a Pregnancy Risk Category. It is unknown if the medication is excreted in breast milk.
Opioid Pregnancy And Lactation Text: These medications can lead to premature delivery and should be avoided during pregnancy. These medications are also present in breast milk in small amounts.
Enbrel Counseling:  I discussed with the patient the risks of etanercept including but not limited to myelosuppression, immunosuppression, autoimmune hepatitis, demyelinating diseases, lymphoma, and infections.  The patient understands that monitoring is required including a PPD at baseline and must alert us or the primary physician if symptoms of infection or other concerning signs are noted.
Birth Control Pills Pregnancy And Lactation Text: This medication should be avoided if pregnant and for the first 30 days post-partum.
Doxepin Counseling:  Patient advised that the medication is sedating and not to drive a car after taking this medication. Patient informed of potential adverse effects including but not limited to dry mouth, urinary retention, and blurry vision.  The patient verbalized understanding of the proper use and possible adverse effects of doxepin.  All of the patient's questions and concerns were addressed.
Bactrim Pregnancy And Lactation Text: This medication is Pregnancy Category D and is known to cause fetal risk.  It is also excreted in breast milk.
Drysol Pregnancy And Lactation Text: This medication is considered safe during pregnancy and breast feeding.
Taltz Counseling: I discussed with the patient the risks of ixekizumab including but not limited to immunosuppression, serious infections, worsening of inflammatory bowel disease and drug reactions.  The patient understands that monitoring is required including a PPD at baseline and must alert us or the primary physician if symptoms of infection or other concerning signs are noted.
Acitretin Counseling:  I discussed with the patient the risks of acitretin including but not limited to hair loss, dry lips/skin/eyes, liver damage, hyperlipidemia, depression/suicidal ideation, photosensitivity.  Serious rare side effects can include but are not limited to pancreatitis, pseudotumor cerebri, bony changes, clot formation/stroke/heart attack.  Patient understands that alcohol is contraindicated since it can result in liver toxicity and significantly prolong the elimination of the drug by many years.
Glycopyrrolate Pregnancy And Lactation Text: This medication is Pregnancy Category B and is considered safe during pregnancy. It is unknown if it is excreted breast milk.
Rhofade Counseling: Rhofade is a topical medication which can decrease superficial blood flow where applied. Side effects are uncommon and include stinging, redness and allergic reactions.
Opioid Counseling: I discussed with the patient the potential side effects of opioids including but not limited to addiction, altered mental status, and depression. I stressed avoiding alcohol, benzodiazepines, muscle relaxants and sleep aids unless specifically okayed by a physician. The patient verbalized understanding of the proper use and possible adverse effects of opioids. All of the patient's questions and concerns were addressed. They were instructed to flush the remaining pills down the toilet if they did not need them for pain.
Cimetidine Pregnancy And Lactation Text: This medication is Pregnancy Category B and is considered safe during pregnancy. It is also excreted in breast milk and breast feeding isn't recommended.
Dupixent Pregnancy And Lactation Text: This medication likely crosses the placenta but the risk for the fetus is uncertain. This medication is excreted in breast milk.
Drysol Counseling:  I discussed with the patient the risks of drysol/aluminum chloride including but not limited to skin rash, itching, irritation, burning.
Rifampin Pregnancy And Lactation Text: This medication is Pregnancy Category C and it isn't know if it is safe during pregnancy. It is also excreted in breast milk and should not be used if you are breast feeding.
Protopic Pregnancy And Lactation Text: This medication is Pregnancy Category C. It is unknown if this medication is excreted in breast milk when applied topically.
Bactrim Counseling:  I discussed with the patient the risks of sulfa antibiotics including but not limited to GI upset, allergic reaction, drug rash, diarrhea, dizziness, photosensitivity, and yeast infections.  Rarely, more serious reactions can occur including but not limited to aplastic anemia, agranulocytosis, methemoglobinemia, blood dyscrasias, liver or kidney failure, lung infiltrates or desquamative/blistering drug rashes.
Birth Control Pills Counseling: Birth Control Pill Counseling: I discussed with the patient the potential side effects of OCPs including but not limited to increased risk of stroke, heart attack, thrombophlebitis, deep venous thrombosis, hepatic adenomas, breast changes, GI upset, headaches, and depression.  The patient verbalized understanding of the proper use and possible adverse effects of OCPs. All of the patient's questions and concerns were addressed.
Glycopyrrolate Counseling:  I discussed with the patient the risks of glycopyrrolate including but not limited to skin rash, drowsiness, dry mouth, difficulty urinating, and blurred vision.
5-Fu Pregnancy And Lactation Text: This medication is Pregnancy Category X and contraindicated in pregnancy and in women who may become pregnant. It is unknown if this medication is excreted in breast milk.
Cimetidine Counseling:  I discussed with the patient the risks of Cimetidine including but not limited to gynecomastia, headache, diarrhea, nausea, drowsiness, arrhythmias, pancreatitis, skin rashes, psychosis, bone marrow suppression and kidney toxicity.
Azithromycin Pregnancy And Lactation Text: This medication is considered safe during pregnancy and is also secreted in breast milk.
Rifampin Counseling: I discussed with the patient the risks of rifampin including but not limited to liver damage, kidney damage, red-orange body fluids, nausea/vomiting and severe allergy.
Stelara Counseling:  I discussed with the patient the risks of ustekinumab including but not limited to immunosuppression, malignancy, posterior leukoencephalopathy syndrome, and serious infections.  The patient understands that monitoring is required including a PPD at baseline and must alert us or the primary physician if symptoms of infection or other concerning signs are noted.
Dupixent Counseling: I discussed with the patient the risks of dupilumab including but not limited to eye infection and irritation, cold sores, injection site reactions, worsening of asthma, allergic reactions and increased risk of parasitic infection.  Live vaccines should be avoided while taking dupilumab. Dupilumab will also interact with certain medications such as warfarin and cyclosporine. The patient understands that monitoring is required and they must alert us or the primary physician if symptoms of infection or other concerning signs are noted.
Prednisone Pregnancy And Lactation Text: This medication is Pregnancy Category C and it isn't know if it is safe during pregnancy. This medication is excreted in breast milk.
Propranolol Pregnancy And Lactation Text: This medication is Pregnancy Category C and it isn't known if it is safe during pregnancy. It is excreted in breast milk.
Protopic Counseling: Patient may experience a mild burning sensation during topical application. Protopic is not approved in children less than 2 years of age. There have been case reports of hematologic and skin malignancies in patients using topical calcineurin inhibitors although causality is questionable.
Gabapentin Pregnancy And Lactation Text: This medication is Pregnancy Category C and isn't considered safe during pregnancy. It is excreted in breast milk.
Quinolones Pregnancy And Lactation Text: This medication is Pregnancy Category C and it isn't know if it is safe during pregnancy. It is also excreted in breast milk.
Azithromycin Counseling:  I discussed with the patient the risks of azithromycin including but not limited to GI upset, allergic reaction, drug rash, diarrhea, and yeast infections.
5-Fu Counseling: 5-Fluorouracil Counseling:  I discussed with the patient the risks of 5-fluorouracil including but not limited to erythema, scaling, itching, weeping, crusting, and pain.
Gabapentin Counseling: I discussed with the patient the risks of gabapentin including but not limited to dizziness, somnolence, fatigue and ataxia.
Propranolol Counseling:  I discussed with the patient the risks of propranolol including but not limited to low heart rate, low blood pressure, low blood sugar, restlessness and increased cold sensitivity. They should call the office if they experience any of these side effects.
Prednisone Counseling:  I discussed with the patient the risks of prolonged use of prednisone including but not limited to weight gain, insomnia, osteoporosis, mood changes, diabetes, susceptibility to infection, glaucoma and high blood pressure.  In cases where prednisone use is prolonged, patients should be monitored with blood pressure checks, serum glucose levels and an eye exam.  Additionally, the patient may need to be placed on GI prophylaxis, PCP prophylaxis, and calcium and vitamin D supplementation and/or a bisphosphonate.  The patient verbalized understanding of the proper use and the possible adverse effects of prednisone.  All of the patient's questions and concerns were addressed.
Quinolones Counseling:  I discussed with the patient the risks of fluoroquinolones including but not limited to GI upset, allergic reaction, drug rash, diarrhea, dizziness, photosensitivity, yeast infections, liver function test abnormalities, tendonitis/tendon rupture.
Calcipotriene Pregnancy And Lactation Text: This medication has not been proven safe during pregnancy. It is unknown if this medication is excreted in breast milk.
Skyrizi Counseling: I discussed with the patient the risks of risankizumab-rzaa including but not limited to immunosuppression, and serious infections.  The patient understands that monitoring is required including a PPD at baseline and must alert us or the primary physician if symptoms of infection or other concerning signs are noted.
Cosentyx Counseling:  I discussed with the patient the risks of Cosentyx including but not limited to worsening of Crohn's disease, immunosuppression, allergic reactions and infections.  The patient understands that monitoring is required including a PPD at baseline and must alert us or the primary physician if symptoms of infection or other concerning signs are noted.
Zyclara Counseling:  I discussed with the patient the risks of imiquimod including but not limited to erythema, scaling, itching, weeping, crusting, and pain.  Patient understands that the inflammatory response to imiquimod is variable from person to person and was educated regarded proper titration schedule.  If flu-like symptoms develop, patient knows to discontinue the medication and contact us.
Oxybutynin Pregnancy And Lactation Text: This medication is Pregnancy Category B and is considered safe during pregnancy. It is unknown if it is excreted in breast milk.
Finasteride Pregnancy And Lactation Text: This medication is absolutely contraindicated during pregnancy. It is unknown if it is excreted in breast milk.
Methotrexate Pregnancy And Lactation Text: This medication is Pregnancy Category X and is known to cause fetal harm. This medication is excreted in breast milk.
Picato Counseling:  I discussed with the patient the risks of Picato including but not limited to erythema, scaling, itching, weeping, crusting, and pain.
Terbinafine Counseling: Patient counseling regarding adverse effects of terbinafine including but not limited to headache, diarrhea, rash, upset stomach, liver function test abnormalities, itching, taste/smell disturbance, nausea, abdominal pain, and flatulence.  There is a rare possibility of liver failure that can occur when taking terbinafine.  The patient understands that a baseline LFT and kidney function test may be required. The patient verbalized understanding of the proper use and possible adverse effects of terbinafine.  All of the patient's questions and concerns were addressed.
Cimzia Pregnancy And Lactation Text: This medication crosses the placenta but can be considered safe in certain situations. Cimzia may be excreted in breast milk.
Oxybutynin Counseling:  I discussed with the patient the risks of oxybutynin including but not limited to skin rash, drowsiness, dry mouth, difficulty urinating, and blurred vision.
Calcipotriene Counseling:  I discussed with the patient the risks of calcipotriene including but not limited to erythema, scaling, itching, and irritation.
Minocycline Counseling: Patient advised regarding possible photosensitivity and discoloration of the teeth, skin, lips, tongue and gums.  Patient instructed to avoid sunlight, if possible.  When exposed to sunlight, patients should wear protective clothing, sunglasses, and sunscreen.  The patient was instructed to call the office immediately if the following severe adverse effects occur:  hearing changes, easy bruising/bleeding, severe headache, or vision changes.  The patient verbalized understanding of the proper use and possible adverse effects of minocycline.  All of the patient's questions and concerns were addressed.
Finasteride Male Counseling: Finasteride Counseling:  I discussed with the patient the risks of use of finasteride including but not limited to decreased libido, decreased ejaculate volume, gynecomastia, and depression. Women should not handle medication.  All of the patient's questions and concerns were addressed.
Simponi Counseling:  I discussed with the patient the risks of golimumab including but not limited to myelosuppression, immunosuppression, autoimmune hepatitis, demyelinating diseases, lymphoma, and serious infections.  The patient understands that monitoring is required including a PPD at baseline and must alert us or the primary physician if symptoms of infection or other concerning signs are noted.
Methotrexate Counseling:  Patient counseled regarding adverse effects of methotrexate including but not limited to nausea, vomiting, abnormalities in liver function tests. Patients may develop mouth sores, rash, diarrhea, and abnormalities in blood counts. The patient understands that monitoring is required including LFT's and blood counts.  There is a rare possibility of scarring of the liver and lung problems that can occur when taking methotrexate. Persistent nausea, loss of appetite, pale stools, dark urine, cough, and shortness of breath should be reported immediately. Patient advised to discontinue methotrexate treatment at least three months before attempting to become pregnant.  I discussed the need for folate supplements while taking methotrexate.  These supplements can decrease side effects during methotrexate treatment. The patient verbalized understanding of the proper use and possible adverse effects of methotrexate.  All of the patient's questions and concerns were addressed.
Cimzia Counseling:  I discussed with the patient the risks of Cimzia including but not limited to immunosuppression, allergic reactions and infections.  The patient understands that monitoring is required including a PPD at baseline and must alert us or the primary physician if symptoms of infection or other concerning signs are noted.
Wartpeel Counseling:  I discussed with the patient the risks of Wartpeel including but not limited to erythema, scaling, itching, weeping, crusting, and pain.
Ketoconazole Pregnancy And Lactation Text: This medication is Pregnancy Category C and it isn't know if it is safe during pregnancy. It is also excreted in breast milk and breast feeding isn't recommended.
Mirvaso Counseling: Mirvaso is a topical medication which can decrease superficial blood flow where applied. Side effects are uncommon and include stinging, redness and allergic reactions.
Otezla Pregnancy And Lactation Text: This medication is Pregnancy Category C and it isn't known if it is safe during pregnancy. It is unknown if it is excreted in breast milk.
Metronidazole Pregnancy And Lactation Text: This medication is Pregnancy Category B and considered safe during pregnancy.  It is also excreted in breast milk.
Carac Counseling:  I discussed with the patient the risks of Carac including but not limited to erythema, scaling, itching, weeping, crusting, and pain.
Ketoconazole Counseling:   Patient counseled regarding improving absorption with orange juice.  Adverse effects include but are not limited to breast enlargement, headache, diarrhea, nausea, upset stomach, liver function test abnormalities, taste disturbance, and stomach pain.  There is a rare possibility of liver failure that can occur when taking ketoconazole. The patient understands that monitoring of LFTs may be required, especially at baseline. The patient verbalized understanding of the proper use and possible adverse effects of ketoconazole.  All of the patient's questions and concerns were addressed.
Topical Sulfur Applications Pregnancy And Lactation Text: This medication is Pregnancy Category C and has an unknown safety profile during pregnancy. It is unknown if this topical medication is excreted in breast milk.
Use Enhanced Medication Counseling?: No
Cyclosporine Counseling:  I discussed with the patient the risks of cyclosporine including but not limited to hypertension, gingival hyperplasia,myelosuppression, immunosuppression, liver damage, kidney damage, neurotoxicity, lymphoma, and serious infections. The patient understands that monitoring is required including baseline blood pressure, CBC, CMP, lipid panel and uric acid, and then 1-2 times monthly CMP and blood pressure.
Otezla Counseling: The side effects of Otezla were discussed with the patient, including but not limited to worsening or new depression, weight loss, diarrhea, nausea, upper respiratory tract infection, and headache. Patient instructed to call the office should any adverse effect occur.  The patient verbalized understanding of the proper use and possible adverse effects of Otezla.  All the patient's questions and concerns were addressed.
Erivedge Counseling- I discussed with the patient the risks of Erivedge including but not limited to nausea, vomiting, diarrhea, constipation, weight loss, changes in the sense of taste, decreased appetite, muscle spasms, and hair loss.  The patient verbalized understanding of the proper use and possible adverse effects of Erivedge.  All of the patient's questions and concerns were addressed.
Metronidazole Counseling:  I discussed with the patient the risks of metronidazole including but not limited to seizures, nausea/vomiting, a metallic taste in the mouth, nausea/vomiting and severe allergy.
Cyclophosphamide Pregnancy And Lactation Text: This medication is Pregnancy Category D and it isn't considered safe during pregnancy. This medication is excreted in breast milk.
Siliq Counseling:  I discussed with the patient the risks of Siliq including but not limited to new or worsening depression, suicidal thoughts and behavior, immunosuppression, malignancy, posterior leukoencephalopathy syndrome, and serious infections.  The patient understands that monitoring is required including a PPD at baseline and must alert us or the primary physician if symptoms of infection or other concerning signs are noted. There is also a special program designed to monitor depression which is required with Siliq.
Benzoyl Peroxide Pregnancy And Lactation Text: This medication is Pregnancy Category C. It is unknown if benzoyl peroxide is excreted in breast milk.
Topical Sulfur Applications Counseling: Topical Sulfur Counseling: Patient counseled that this medication may cause skin irritation or allergic reactions.  In the event of skin irritation, the patient was advised to reduce the amount of the drug applied or use it less frequently.   The patient verbalized understanding of the proper use and possible adverse effects of topical sulfur application.  All of the patient's questions and concerns were addressed.
Dapsone Pregnancy And Lactation Text: This medication is Pregnancy Category C and is not considered safe during pregnancy or breast feeding.
Valtrex Pregnancy And Lactation Text: this medication is Pregnancy Category B and is considered safe during pregnancy. This medication is not directly found in breast milk but it's metabolite acyclovir is present.
Itraconazole Counseling:  I discussed with the patient the risks of itraconazole including but not limited to liver damage, nausea/vomiting, neuropathy, and severe allergy.  The patient understands that this medication is best absorbed when taken with acidic beverages such as non-diet cola or ginger ale.  The patient understands that monitoring is required including baseline LFTs and repeat LFTs at intervals.  The patient understands that they are to contact us or the primary physician if concerning signs are noted.
Erythromycin Pregnancy And Lactation Text: This medication is Pregnancy Category B and is considered safe during pregnancy. It is also excreted in breast milk.
Minoxidil Counseling: Minoxidil is a topical medication which can increase blood flow where it is applied. It is uncertain how this medication increases hair growth. Side effects are uncommon and include stinging and allergic reactions.
Benzoyl Peroxide Counseling: Patient counseled that medicine may cause skin irritation and bleach clothing.  In the event of skin irritation, the patient was advised to reduce the amount of the drug applied or use it less frequently.   The patient verbalized understanding of the proper use and possible adverse effects of benzoyl peroxide.  All of the patient's questions and concerns were addressed.
Azathioprine Counseling:  I discussed with the patient the risks of azathioprine including but not limited to myelosuppression, immunosuppression, hepatotoxicity, lymphoma, and infections.  The patient understands that monitoring is required including baseline LFTs, Creatinine, possible TPMP genotyping and weekly CBCs for the first month and then every 2 weeks thereafter.  The patient verbalized understanding of the proper use and possible adverse effects of azathioprine.  All of the patient's questions and concerns were addressed.
Rituxan Pregnancy And Lactation Text: This medication is Pregnancy Category C and it isn't know if it is safe during pregnancy. It is unknown if this medication is excreted in breast milk but similar antibodies are known to be excreted.
Odomzo Counseling- I discussed with the patient the risks of Odomzo including but not limited to nausea, vomiting, diarrhea, constipation, weight loss, changes in the sense of taste, decreased appetite, muscle spasms, and hair loss.  The patient verbalized understanding of the proper use and possible adverse effects of Odomzo.  All of the patient's questions and concerns were addressed.
Dapsone Counseling: I discussed with the patient the risks of dapsone including but not limited to hemolytic anemia, agranulocytosis, rashes, methemoglobinemia, kidney failure, peripheral neuropathy, headaches, GI upset, and liver toxicity.  Patients who start dapsone require monitoring including baseline LFTs and weekly CBCs for the first month, then every month thereafter.  The patient verbalized understanding of the proper use and possible adverse effects of dapsone.  All of the patient's questions and concerns were addressed.
Valtrex Counseling: I discussed with the patient the risks of valacyclovir including but not limited to kidney damage, nausea, vomiting and severe allergy.  The patient understands that if the infection seems to be worsening or is not improving, they are to call.
Cyclophosphamide Counseling:  I discussed with the patient the risks of cyclophosphamide including but not limited to hair loss, hormonal abnormalities, decreased fertility, abdominal pain, diarrhea, nausea and vomiting, bone marrow suppression and infection. The patient understands that monitoring is required while taking this medication.
Griseofulvin Pregnancy And Lactation Text: This medication is Pregnancy Category X and is known to cause serious birth defects. It is unknown if this medication is excreted in breast milk but breast feeding should be avoided.
Erythromycin Counseling:  I discussed with the patient the risks of erythromycin including but not limited to GI upset, allergic reaction, drug rash, diarrhea, increase in liver enzymes, and yeast infections.
Topical Clindamycin Counseling: Patient counseled that this medication may cause skin irritation or allergic reactions.  In the event of skin irritation, the patient was advised to reduce the amount of the drug applied or use it less frequently.   The patient verbalized understanding of the proper use and possible adverse effects of clindamycin.  All of the patient's questions and concerns were addressed.
Rituxan Counseling:  I discussed with the patient the risks of Rituxan infusions. Side effects can include infusion reactions, severe drug rashes including mucocutaneous reactions, reactivation of latent hepatitis and other infections and rarely progressive multifocal leukoencephalopathy.  All of the patient's questions and concerns were addressed.
Tranexamic Acid Pregnancy And Lactation Text: It is unknown if this medication is safe during pregnancy or breast feeding.
Ivermectin Pregnancy And Lactation Text: This medication is Pregnancy Category C and it isn't known if it is safe during pregnancy. It is also excreted in breast milk.
Nsaids Pregnancy And Lactation Text: These medications are considered safe up to 30 weeks gestation. It is excreted in breast milk.
Detail Level: Simple
Imiquimod Counseling:  I discussed with the patient the risks of imiquimod including but not limited to erythema, scaling, itching, weeping, crusting, and pain.  Patient understands that the inflammatory response to imiquimod is variable from person to person and was educated regarded proper titration schedule.  If flu-like symptoms develop, patient knows to discontinue the medication and contact us.
Doxycycline Pregnancy And Lactation Text: This medication is Pregnancy Category D and not consider safe during pregnancy. It is also excreted in breast milk but is considered safe for shorter treatment courses.
Griseofulvin Counseling:  I discussed with the patient the risks of griseofulvin including but not limited to photosensitivity, cytopenia, liver damage, nausea/vomiting and severe allergy.  The patient understands that this medication is best absorbed when taken with a fatty meal (e.g., ice cream or french fries).
Xolair Pregnancy And Lactation Text: This medication is Pregnancy Category B and is considered safe during pregnancy. This medication is excreted in breast milk.
Cellcept Pregnancy And Lactation Text: This medication is Pregnancy Category D and isn't considered safe during pregnancy. It is unknown if this medication is excreted in breast milk.
Ivermectin Counseling:  Patient instructed to take medication on an empty stomach with a full glass of water.  Patient informed of potential adverse effects including but not limited to nausea, diarrhea, dizziness, itching, and swelling of the extremities or lymph nodes.  The patient verbalized understanding of the proper use and possible adverse effects of ivermectin.  All of the patient's questions and concerns were addressed.
Tranexamic Acid Counseling:  Patient advised of the small risk of bleeding problems with tranexamic acid. They were also instructed to call if they developed any nausea, vomiting or diarrhea. All of the patient's questions and concerns were addressed.
Tazorac Pregnancy And Lactation Text: This medication is not safe during pregnancy. It is unknown if this medication is excreted in breast milk.
Nsaids Counseling: NSAID Counseling: I discussed with the patient that NSAIDs should be taken with food. Prolonged use of NSAIDs can result in the development of stomach ulcers.  Patient advised to stop taking NSAIDs if abdominal pain occurs.  The patient verbalized understanding of the proper use and possible adverse effects of NSAIDs.  All of the patient's questions and concerns were addressed.
Cellcept Counseling:  I discussed with the patient the risks of mycophenolate mofetil including but not limited to infection/immunosuppression, GI upset, hypokalemia, hypercholesterolemia, bone marrow suppression, lymphoproliferative disorders, malignancy, GI ulceration/bleed/perforation, colitis, interstitial lung disease, kidney failure, progressive multifocal leukoencephalopathy, and birth defects.  The patient understands that monitoring is required including a baseline creatinine and regular CBC testing. In addition, patient must alert us immediately if symptoms of infection or other concerning signs are noted.
High Dose Vitamin A Pregnancy And Lactation Text: High dose vitamin A therapy is contraindicated during pregnancy and breast feeding.
Infliximab Counseling:  I discussed with the patient the risks of infliximab including but not limited to myelosuppression, immunosuppression, autoimmune hepatitis, demyelinating diseases, lymphoma, and serious infections.  The patient understands that monitoring is required including a PPD at baseline and must alert us or the primary physician if symptoms of infection or other concerning signs are noted.
Colchicine Counseling:  Patient counseled regarding adverse effects including but not limited to stomach upset (nausea, vomiting, stomach pain, or diarrhea).  Patient instructed to limit alcohol consumption while taking this medication.  Colchicine may reduce blood counts especially with prolonged use.  The patient understands that monitoring of kidney function and blood counts may be required, especially at baseline. The patient verbalized understanding of the proper use and possible adverse effects of colchicine.  All of the patient's questions and concerns were addressed.
Tazorac Counseling:  Patient advised that medication is irritating and drying.  Patient may need to apply sparingly and wash off after an hour before eventually leaving it on overnight.  The patient verbalized understanding of the proper use and possible adverse effects of tazorac.  All of the patient's questions and concerns were addressed.
Niacinamide Pregnancy And Lactation Text: These medications are considered safe during pregnancy.
Xolair Counseling:  Patient informed of potential adverse effects including but not limited to fever, muscle aches, rash and allergic reactions.  The patient verbalized understanding of the proper use and possible adverse effects of Xolair.  All of the patient's questions and concerns were addressed.
High Dose Vitamin A Counseling: Side effects reviewed, pt to contact office should one occur.
Hydroquinone Counseling:  Patient advised that medication may result in skin irritation, lightening (hypopigmentation), dryness, and burning.  In the event of skin irritation, the patient was advised to reduce the amount of the drug applied or use it less frequently.  Rarely, spots that are treated with hydroquinone can become darker (pseudoochronosis).  Should this occur, patient instructed to stop medication and call the office. The patient verbalized understanding of the proper use and possible adverse effects of hydroquinone.  All of the patient's questions and concerns were addressed.
Albendazole Counseling:  I discussed with the patient the risks of albendazole including but not limited to cytopenia, kidney damage, nausea/vomiting and severe allergy.  The patient understands that this medication is being used in an off-label manner.
Fluconazole Counseling:  Patient counseled regarding adverse effects of fluconazole including but not limited to headache, diarrhea, nausea, upset stomach, liver function test abnormalities, taste disturbance, and stomach pain.  There is a rare possibility of liver failure that can occur when taking fluconazole.  The patient understands that monitoring of LFTs and kidney function test may be required, especially at baseline. The patient verbalized understanding of the proper use and possible adverse effects of fluconazole.  All of the patient's questions and concerns were addressed.
Doxycycline Counseling:  Patient counseled regarding possible photosensitivity and increased risk for sunburn.  Patient instructed to avoid sunlight, if possible.  When exposed to sunlight, patients should wear protective clothing, sunglasses, and sunscreen.  The patient was instructed to call the office immediately if the following severe adverse effects occur:  hearing changes, easy bruising/bleeding, severe headache, or vision changes.  The patient verbalized understanding of the proper use and possible adverse effects of doxycycline.  All of the patient's questions and concerns were addressed.
Xeledouardz Pregnancy And Lactation Text: This medication is Pregnancy Category D and is not considered safe during pregnancy.  The risk during breast feeding is also uncertain.

## 2021-07-12 NOTE — PROCEDURE: ADDITIONAL NOTES
Detail Level: Simple
Additional Notes: Recommend dye free/fragrance free protocol  \\nEmphasized importance of emollient use \\nAvoid scratching or rubbing affected areas \\nKeep nails trim.\\n Avoid home remedies.
Additional Notes: Patient well controlled on doxycycline 20mg BID

## 2021-07-12 NOTE — HPI: PIMPLES (ACNE)
How Severe Is Your Acne?: mild
Is This A New Presentation, Or A Follow-Up?: Follow Up Acne
What Type Of Note Output Would You Prefer (Optional)?: Bullet Format
Additional Comments (Use Complete Sentences): Patient states she would like a refill on Doxycycline.

## 2021-07-12 NOTE — PROCEDURE: DIAGNOSIS COMMENT
Detail Level: Detailed
Comment: Recommend continuing with aggressive emollient use.
Comment: Patient has noticed much improvement with using Sarna and with moisturizing. Will continue this and patient may d/c triamcinolone as she states it doesn't help as well as the Sarna  and daily moisturizers. No active dermatitis present, xerosis present. Reiterated continuing use of moisturizers on a daily basis.
Render Risk Assessment In Note?: yes
Render Risk Assessment In Note?: no
Comment: Patient began using a new retinol product, her skin on her chin has been very dry and she has been picking the dry skin. Advised her to avoid picking. Use retinol product once a week until her skin is acclimated then increase frequency as she can tolerate. She may continue Metrocream and doxycycline 20mg as well as she states these have helped.

## 2021-07-12 NOTE — PROCEDURE: LIQUID NITROGEN
Render Note In Bullet Format When Appropriate: No
Post-Care Instructions: I reviewed with the patient in detail post-care instructions. Patient is to wear sunprotection, and avoid picking at any of the treated lesions. Pt may apply Vaseline to crusted or scabbing areas.
Duration Of Freeze Thaw-Cycle (Seconds): 3
Detail Level: Detailed
Show Applicator Variable?: Yes
Consent: The patient's consent was obtained including but not limited to risks of crusting, scabbing, blistering, scarring, darker or lighter pigmentary change, recurrence, incomplete removal and infection.

## 2021-07-12 NOTE — HPI: SECONDARY COMPLAINT
How Severe Is This Condition?: mild
Additional History: Patients states rash is unchanged with Triamcinolone. Patient is using OTC sarna lotion and it keeps rash at bay.

## 2021-07-26 ENCOUNTER — RX ONLY (OUTPATIENT)
Age: 56
Setting detail: RX ONLY
End: 2021-07-26

## 2021-07-26 RX ORDER — METRONIDAZOLE 7.5 MG/G
GEL TOPICAL
Qty: 1 | Refills: 6 | Status: ERX | COMMUNITY
Start: 2021-07-26

## 2021-10-01 ENCOUNTER — APPOINTMENT (OUTPATIENT)
Dept: RADIOLOGY | Facility: MEDICAL CENTER | Age: 56
End: 2021-10-01
Attending: NURSE PRACTITIONER

## 2021-10-11 ENCOUNTER — APPOINTMENT (RX ONLY)
Dept: URBAN - METROPOLITAN AREA CLINIC 4 | Facility: CLINIC | Age: 56
Setting detail: DERMATOLOGY
End: 2021-10-11

## 2021-10-11 DIAGNOSIS — L71.8 OTHER ROSACEA: ICD-10-CM

## 2021-10-11 DIAGNOSIS — D22 MELANOCYTIC NEVI: ICD-10-CM

## 2021-10-11 DIAGNOSIS — L82.1 OTHER SEBORRHEIC KERATOSIS: ICD-10-CM

## 2021-10-11 DIAGNOSIS — L81.4 OTHER MELANIN HYPERPIGMENTATION: ICD-10-CM

## 2021-10-11 DIAGNOSIS — D18.0 HEMANGIOMA: ICD-10-CM

## 2021-10-11 DIAGNOSIS — Z71.89 OTHER SPECIFIED COUNSELING: ICD-10-CM

## 2021-10-11 DIAGNOSIS — L70.8 OTHER ACNE: ICD-10-CM | Status: WELL CONTROLLED

## 2021-10-11 PROBLEM — D22.5 MELANOCYTIC NEVI OF TRUNK: Status: ACTIVE | Noted: 2021-10-11

## 2021-10-11 PROBLEM — D22.61 MELANOCYTIC NEVI OF RIGHT UPPER LIMB, INCLUDING SHOULDER: Status: ACTIVE | Noted: 2021-10-11

## 2021-10-11 PROBLEM — D22.39 MELANOCYTIC NEVI OF OTHER PARTS OF FACE: Status: ACTIVE | Noted: 2021-10-11

## 2021-10-11 PROBLEM — D22.71 MELANOCYTIC NEVI OF RIGHT LOWER LIMB, INCLUDING HIP: Status: ACTIVE | Noted: 2021-10-11

## 2021-10-11 PROBLEM — D22.72 MELANOCYTIC NEVI OF LEFT LOWER LIMB, INCLUDING HIP: Status: ACTIVE | Noted: 2021-10-11

## 2021-10-11 PROBLEM — D18.01 HEMANGIOMA OF SKIN AND SUBCUTANEOUS TISSUE: Status: ACTIVE | Noted: 2021-10-11

## 2021-10-11 PROBLEM — D22.62 MELANOCYTIC NEVI OF LEFT UPPER LIMB, INCLUDING SHOULDER: Status: ACTIVE | Noted: 2021-10-11

## 2021-10-11 PROCEDURE — ? SUNSCREEN TREATMENT REGIMEN

## 2021-10-11 PROCEDURE — ? COUNSELING

## 2021-10-11 PROCEDURE — 99213 OFFICE O/P EST LOW 20 MIN: CPT

## 2021-10-11 PROCEDURE — ? PRESCRIPTION

## 2021-10-11 PROCEDURE — ? DIAGNOSIS COMMENT

## 2021-10-11 RX ORDER — DOXYCYCLINE HYCLATE 20 MG/1
TABLET, FILM COATED ORAL
Qty: 60 | Refills: 2 | Status: ERX

## 2021-10-11 ASSESSMENT — LOCATION DETAILED DESCRIPTION DERM
LOCATION DETAILED: LEFT ANTERIOR PROXIMAL UPPER ARM
LOCATION DETAILED: LEFT ANTERIOR DISTAL UPPER ARM
LOCATION DETAILED: LEFT INFERIOR MEDIAL MALAR CHEEK
LOCATION DETAILED: RIGHT MEDIAL FOREHEAD
LOCATION DETAILED: LEFT SUPERIOR MEDIAL UPPER BACK
LOCATION DETAILED: LEFT ANTERIOR PROXIMAL THIGH
LOCATION DETAILED: RIGHT INFERIOR MEDIAL MALAR CHEEK
LOCATION DETAILED: STERNAL NOTCH
LOCATION DETAILED: LEFT INFERIOR MEDIAL FOREHEAD
LOCATION DETAILED: SUPERIOR MID FOREHEAD
LOCATION DETAILED: SUPERIOR THORACIC SPINE
LOCATION DETAILED: LEFT LOWER CUTANEOUS LIP
LOCATION DETAILED: RIGHT CENTRAL MALAR CHEEK
LOCATION DETAILED: NASAL TIP
LOCATION DETAILED: INFERIOR THORACIC SPINE
LOCATION DETAILED: RIGHT ANTERIOR PROXIMAL UPPER ARM
LOCATION DETAILED: LOWER STERNUM
LOCATION DETAILED: MIDDLE STERNUM
LOCATION DETAILED: RIGHT ANTERIOR PROXIMAL THIGH
LOCATION DETAILED: LEFT CHIN
LOCATION DETAILED: EPIGASTRIC SKIN
LOCATION DETAILED: RIGHT MEDIAL MALAR CHEEK
LOCATION DETAILED: LEFT CENTRAL MALAR CHEEK
LOCATION DETAILED: LEFT MEDIAL UPPER BACK
LOCATION DETAILED: RIGHT ANTERIOR DISTAL UPPER ARM

## 2021-10-11 ASSESSMENT — LOCATION SIMPLE DESCRIPTION DERM
LOCATION SIMPLE: LEFT UPPER BACK
LOCATION SIMPLE: CHIN
LOCATION SIMPLE: RIGHT THIGH
LOCATION SIMPLE: LEFT FOREHEAD
LOCATION SIMPLE: RIGHT CHEEK
LOCATION SIMPLE: SUPERIOR FOREHEAD
LOCATION SIMPLE: RIGHT FOREHEAD
LOCATION SIMPLE: LEFT THIGH
LOCATION SIMPLE: LEFT CHEEK
LOCATION SIMPLE: LEFT UPPER ARM
LOCATION SIMPLE: RIGHT UPPER ARM
LOCATION SIMPLE: UPPER BACK
LOCATION SIMPLE: ABDOMEN
LOCATION SIMPLE: NOSE
LOCATION SIMPLE: CHEST
LOCATION SIMPLE: LEFT LIP

## 2021-10-11 ASSESSMENT — LOCATION ZONE DERM
LOCATION ZONE: FACE
LOCATION ZONE: ARM
LOCATION ZONE: TRUNK
LOCATION ZONE: LIP
LOCATION ZONE: LEG
LOCATION ZONE: NOSE

## 2021-10-11 NOTE — PROCEDURE: COUNSELING
Detail Level: Generalized
Doxycycline Pregnancy And Lactation Text: This medication is Pregnancy Category D and not consider safe during pregnancy. It is also excreted in breast milk but is considered safe for shorter treatment courses.
Spironolactone Counseling: Patient advised regarding risks of diarrhea, abdominal pain, hyperkalemia, birth defects (for female patients), liver toxicity and renal toxicity. The patient may need blood work to monitor liver and kidney function and potassium levels while on therapy. The patient verbalized understanding of the proper use and possible adverse effects of spironolactone.  All of the patient's questions and concerns were addressed.
Benzoyl Peroxide Counseling: Patient counseled that medicine may cause skin irritation and bleach clothing.  In the event of skin irritation, the patient was advised to reduce the amount of the drug applied or use it less frequently.   The patient verbalized understanding of the proper use and possible adverse effects of benzoyl peroxide.  All of the patient's questions and concerns were addressed.
Isotretinoin Pregnancy And Lactation Text: This medication is Pregnancy Category X and is considered extremely dangerous during pregnancy. It is unknown if it is excreted in breast milk.
Azithromycin Counseling:  I discussed with the patient the risks of azithromycin including but not limited to GI upset, allergic reaction, drug rash, diarrhea, and yeast infections.
High Dose Vitamin A Counseling: Side effects reviewed, pt to contact office should one occur.
Benzoyl Peroxide Pregnancy And Lactation Text: This medication is Pregnancy Category C. It is unknown if benzoyl peroxide is excreted in breast milk.
Birth Control Pills Pregnancy And Lactation Text: This medication should be avoided if pregnant and for the first 30 days post-partum.
Tazorac Pregnancy And Lactation Text: This medication is not safe during pregnancy. It is unknown if this medication is excreted in breast milk.
Erythromycin Counseling:  I discussed with the patient the risks of erythromycin including but not limited to GI upset, allergic reaction, drug rash, diarrhea, increase in liver enzymes, and yeast infections.
Azithromycin Pregnancy And Lactation Text: This medication is considered safe during pregnancy and is also secreted in breast milk.
Topical Retinoid counseling:  Patient advised to apply a pea-sized amount only at bedtime and wait 30 minutes after washing their face before applying.  If too drying, patient may add a non-comedogenic moisturizer. The patient verbalized understanding of the proper use and possible adverse effects of retinoids.  All of the patient's questions and concerns were addressed.
High Dose Vitamin A Pregnancy And Lactation Text: High dose vitamin A therapy is contraindicated during pregnancy and breast feeding.
Spironolactone Pregnancy And Lactation Text: This medication can cause feminization of the male fetus and should be avoided during pregnancy. The active metabolite is also found in breast milk.
Topical Clindamycin Counseling: Patient counseled that this medication may cause skin irritation or allergic reactions.  In the event of skin irritation, the patient was advised to reduce the amount of the drug applied or use it less frequently.   The patient verbalized understanding of the proper use and possible adverse effects of clindamycin.  All of the patient's questions and concerns were addressed.
Dapsone Counseling: I discussed with the patient the risks of dapsone including but not limited to hemolytic anemia, agranulocytosis, rashes, methemoglobinemia, kidney failure, peripheral neuropathy, headaches, GI upset, and liver toxicity.  Patients who start dapsone require monitoring including baseline LFTs and weekly CBCs for the first month, then every month thereafter.  The patient verbalized understanding of the proper use and possible adverse effects of dapsone.  All of the patient's questions and concerns were addressed.
Bactrim Counseling:  I discussed with the patient the risks of sulfa antibiotics including but not limited to GI upset, allergic reaction, drug rash, diarrhea, dizziness, photosensitivity, and yeast infections.  Rarely, more serious reactions can occur including but not limited to aplastic anemia, agranulocytosis, methemoglobinemia, blood dyscrasias, liver or kidney failure, lung infiltrates or desquamative/blistering drug rashes.
Topical Clindamycin Pregnancy And Lactation Text: This medication is Pregnancy Category B and is considered safe during pregnancy. It is unknown if it is excreted in breast milk.
Tetracycline Counseling: Patient counseled regarding possible photosensitivity and increased risk for sunburn.  Patient instructed to avoid sunlight, if possible.  When exposed to sunlight, patients should wear protective clothing, sunglasses, and sunscreen.  The patient was instructed to call the office immediately if the following severe adverse effects occur:  hearing changes, easy bruising/bleeding, severe headache, or vision changes.  The patient verbalized understanding of the proper use and possible adverse effects of tetracycline.  All of the patient's questions and concerns were addressed. Patient understands to avoid pregnancy while on therapy due to potential birth defects.
Use Enhanced Medication Counseling?: No
Erythromycin Pregnancy And Lactation Text: This medication is Pregnancy Category B and is considered safe during pregnancy. It is also excreted in breast milk.
Dapsone Pregnancy And Lactation Text: This medication is Pregnancy Category C and is not considered safe during pregnancy or breast feeding.
Minocycline Counseling: Patient advised regarding possible photosensitivity and discoloration of the teeth, skin, lips, tongue and gums.  Patient instructed to avoid sunlight, if possible.  When exposed to sunlight, patients should wear protective clothing, sunglasses, and sunscreen.  The patient was instructed to call the office immediately if the following severe adverse effects occur:  hearing changes, easy bruising/bleeding, severe headache, or vision changes.  The patient verbalized understanding of the proper use and possible adverse effects of minocycline.  All of the patient's questions and concerns were addressed.
Topical Retinoid Pregnancy And Lactation Text: This medication is Pregnancy Category C. It is unknown if this medication is excreted in breast milk.
Minocycline Pregnancy And Lactation Text: This medication is Pregnancy Category D and not consider safe during pregnancy. It is also excreted in breast milk.
Isotretinoin Counseling: Patient should get monthly blood tests, not donate blood, not drive at night if vision affected, not share medication, and not undergo elective surgery for 6 months after tx completed. Side effects reviewed, pt to contact office should one occur.
Topical Sulfur Applications Counseling: Topical Sulfur Counseling: Patient counseled that this medication may cause skin irritation or allergic reactions.  In the event of skin irritation, the patient was advised to reduce the amount of the drug applied or use it less frequently.   The patient verbalized understanding of the proper use and possible adverse effects of topical sulfur application.  All of the patient's questions and concerns were addressed.
Bactrim Pregnancy And Lactation Text: This medication is Pregnancy Category D and is known to cause fetal risk.  It is also excreted in breast milk.
Detail Level: Simple
Birth Control Pills Counseling: Birth Control Pill Counseling: I discussed with the patient the potential side effects of OCPs including but not limited to increased risk of stroke, heart attack, thrombophlebitis, deep venous thrombosis, hepatic adenomas, breast changes, GI upset, headaches, and depression.  The patient verbalized understanding of the proper use and possible adverse effects of OCPs. All of the patient's questions and concerns were addressed.
Topical Sulfur Applications Pregnancy And Lactation Text: This medication is Pregnancy Category C and has an unknown safety profile during pregnancy. It is unknown if this topical medication is excreted in breast milk.
Tazorac Counseling:  Patient advised that medication is irritating and drying.  Patient may need to apply sparingly and wash off after an hour before eventually leaving it on overnight.  The patient verbalized understanding of the proper use and possible adverse effects of tazorac.  All of the patient's questions and concerns were addressed.
Doxycycline Counseling:  Patient counseled regarding possible photosensitivity and increased risk for sunburn.  Patient instructed to avoid sunlight, if possible.  When exposed to sunlight, patients should wear protective clothing, sunglasses, and sunscreen.  The patient was instructed to call the office immediately if the following severe adverse effects occur:  hearing changes, easy bruising/bleeding, severe headache, or vision changes.  The patient verbalized understanding of the proper use and possible adverse effects of doxycycline.  All of the patient's questions and concerns were addressed.
Detail Level: Zone

## 2021-10-11 NOTE — PROCEDURE: DIAGNOSIS COMMENT
Render Risk Assessment In Note?: no
Detail Level: Detailed
Comment: Patient is well controlled on doxycycline 20mg, will continue on current treatment regimen.

## 2021-10-11 NOTE — HPI: PIMPLES (ACNE)
How Severe Is Your Acne?: mild
Is This A New Presentation, Or A Follow-Up?: Follow Up Acne
What Type Of Note Output Would You Prefer (Optional)?: Bullet Format
Additional Comments (Use Complete Sentences): Patient is tolerating doxycycline hyclate 20 mg tablet PO and for acne well. Patient discontinued metronidazole 0.75 % topical cream two weeks ago. Patient is using OTC PCA Skin instead of metronidazole cream.

## 2023-01-23 PROBLEM — M43.16 SPONDYLOLISTHESIS OF LUMBAR REGION: Status: ACTIVE | Noted: 2023-01-23

## 2023-01-23 PROBLEM — M48.062 NEUROGENIC CLAUDICATION DUE TO LUMBAR SPINAL STENOSIS: Status: ACTIVE | Noted: 2023-01-23

## 2023-05-10 ENCOUNTER — APPOINTMENT (RX ONLY)
Dept: URBAN - METROPOLITAN AREA CLINIC 4 | Facility: CLINIC | Age: 58
Setting detail: DERMATOLOGY
End: 2023-05-10

## 2023-05-10 DIAGNOSIS — L82.1 OTHER SEBORRHEIC KERATOSIS: ICD-10-CM

## 2023-05-10 DIAGNOSIS — D18.0 HEMANGIOMA: ICD-10-CM

## 2023-05-10 DIAGNOSIS — L82.0 INFLAMED SEBORRHEIC KERATOSIS: ICD-10-CM

## 2023-05-10 DIAGNOSIS — D22 MELANOCYTIC NEVI: ICD-10-CM

## 2023-05-10 DIAGNOSIS — Z71.89 OTHER SPECIFIED COUNSELING: ICD-10-CM

## 2023-05-10 DIAGNOSIS — L72.8 OTHER FOLLICULAR CYSTS OF THE SKIN AND SUBCUTANEOUS TISSUE: ICD-10-CM

## 2023-05-10 DIAGNOSIS — L81.4 OTHER MELANIN HYPERPIGMENTATION: ICD-10-CM

## 2023-05-10 PROBLEM — D22.61 MELANOCYTIC NEVI OF RIGHT UPPER LIMB, INCLUDING SHOULDER: Status: ACTIVE | Noted: 2023-05-10

## 2023-05-10 PROBLEM — D22.39 MELANOCYTIC NEVI OF OTHER PARTS OF FACE: Status: ACTIVE | Noted: 2023-05-10

## 2023-05-10 PROBLEM — D22.72 MELANOCYTIC NEVI OF LEFT LOWER LIMB, INCLUDING HIP: Status: ACTIVE | Noted: 2023-05-10

## 2023-05-10 PROBLEM — D48.5 NEOPLASM OF UNCERTAIN BEHAVIOR OF SKIN: Status: ACTIVE | Noted: 2023-05-10

## 2023-05-10 PROBLEM — D22.5 MELANOCYTIC NEVI OF TRUNK: Status: ACTIVE | Noted: 2023-05-10

## 2023-05-10 PROBLEM — D22.71 MELANOCYTIC NEVI OF RIGHT LOWER LIMB, INCLUDING HIP: Status: ACTIVE | Noted: 2023-05-10

## 2023-05-10 PROBLEM — D18.01 HEMANGIOMA OF SKIN AND SUBCUTANEOUS TISSUE: Status: ACTIVE | Noted: 2023-05-10

## 2023-05-10 PROBLEM — D22.62 MELANOCYTIC NEVI OF LEFT UPPER LIMB, INCLUDING SHOULDER: Status: ACTIVE | Noted: 2023-05-10

## 2023-05-10 PROCEDURE — ? SUNSCREEN TREATMENT REGIMEN

## 2023-05-10 PROCEDURE — 99213 OFFICE O/P EST LOW 20 MIN: CPT | Mod: 25

## 2023-05-10 PROCEDURE — 11103 TANGNTL BX SKIN EA SEP/ADDL: CPT

## 2023-05-10 PROCEDURE — ? BIOPSY BY SHAVE METHOD

## 2023-05-10 PROCEDURE — 11102 TANGNTL BX SKIN SINGLE LES: CPT

## 2023-05-10 PROCEDURE — ? COUNSELING

## 2023-05-10 PROCEDURE — ? DIAGNOSIS COMMENT

## 2023-05-10 ASSESSMENT — LOCATION ZONE DERM
LOCATION ZONE: LEG
LOCATION ZONE: LIP
LOCATION ZONE: FACE
LOCATION ZONE: TRUNK
LOCATION ZONE: ARM

## 2023-05-10 ASSESSMENT — LOCATION DETAILED DESCRIPTION DERM
LOCATION DETAILED: SUPERIOR THORACIC SPINE
LOCATION DETAILED: LEFT INFERIOR MEDIAL FOREHEAD
LOCATION DETAILED: LEFT ANTERIOR PROXIMAL UPPER ARM
LOCATION DETAILED: EPIGASTRIC SKIN
LOCATION DETAILED: MIDDLE STERNUM
LOCATION DETAILED: RIGHT ANTERIOR DISTAL UPPER ARM
LOCATION DETAILED: RIGHT ANTERIOR PROXIMAL THIGH
LOCATION DETAILED: LEFT ANTERIOR MEDIAL DISTAL THIGH
LOCATION DETAILED: LEFT CHIN
LOCATION DETAILED: LEFT ANTERIOR DISTAL UPPER ARM
LOCATION DETAILED: LEFT MEDIAL UPPER BACK
LOCATION DETAILED: SUPERIOR MID FOREHEAD
LOCATION DETAILED: LEFT LOWER CUTANEOUS LIP
LOCATION DETAILED: RIGHT CENTRAL MALAR CHEEK
LOCATION DETAILED: RIGHT MEDIAL MALAR CHEEK
LOCATION DETAILED: RIGHT MEDIAL FOREHEAD
LOCATION DETAILED: LEFT ANTERIOR PROXIMAL THIGH
LOCATION DETAILED: INFERIOR THORACIC SPINE
LOCATION DETAILED: LEFT SUPERIOR MEDIAL UPPER BACK
LOCATION DETAILED: LOWER STERNUM
LOCATION DETAILED: RIGHT INFERIOR MEDIAL MALAR CHEEK
LOCATION DETAILED: LEFT CENTRAL MALAR CHEEK
LOCATION DETAILED: RIGHT ANTERIOR PROXIMAL UPPER ARM
LOCATION DETAILED: STERNAL NOTCH

## 2023-05-10 ASSESSMENT — LOCATION SIMPLE DESCRIPTION DERM
LOCATION SIMPLE: LEFT LIP
LOCATION SIMPLE: CHIN
LOCATION SIMPLE: LEFT FOREHEAD
LOCATION SIMPLE: LEFT UPPER ARM
LOCATION SIMPLE: SUPERIOR FOREHEAD
LOCATION SIMPLE: LEFT THIGH
LOCATION SIMPLE: ABDOMEN
LOCATION SIMPLE: RIGHT FOREHEAD
LOCATION SIMPLE: RIGHT CHEEK
LOCATION SIMPLE: LEFT CHEEK
LOCATION SIMPLE: LEFT UPPER BACK
LOCATION SIMPLE: CHEST
LOCATION SIMPLE: RIGHT THIGH
LOCATION SIMPLE: RIGHT UPPER ARM
LOCATION SIMPLE: UPPER BACK

## 2023-05-10 NOTE — PROCEDURE: COUNSELING
Detail Level: Zone
Detail Level: Generalized
Doxycycline Pregnancy And Lactation Text: This medication is Pregnancy Category D and not consider safe during pregnancy. It is also excreted in breast milk but is considered safe for shorter treatment courses.
Spironolactone Counseling: Patient advised regarding risks of diarrhea, abdominal pain, hyperkalemia, birth defects (for female patients), liver toxicity and renal toxicity. The patient may need blood work to monitor liver and kidney function and potassium levels while on therapy. The patient verbalized understanding of the proper use and possible adverse effects of spironolactone.  All of the patient's questions and concerns were addressed.
Benzoyl Peroxide Counseling: Patient counseled that medicine may cause skin irritation and bleach clothing.  In the event of skin irritation, the patient was advised to reduce the amount of the drug applied or use it less frequently.   The patient verbalized understanding of the proper use and possible adverse effects of benzoyl peroxide.  All of the patient's questions and concerns were addressed.
Isotretinoin Pregnancy And Lactation Text: This medication is Pregnancy Category X and is considered extremely dangerous during pregnancy. It is unknown if it is excreted in breast milk.
Azithromycin Counseling:  I discussed with the patient the risks of azithromycin including but not limited to GI upset, allergic reaction, drug rash, diarrhea, and yeast infections.
High Dose Vitamin A Counseling: Side effects reviewed, pt to contact office should one occur.
Benzoyl Peroxide Pregnancy And Lactation Text: This medication is Pregnancy Category C. It is unknown if benzoyl peroxide is excreted in breast milk.
Birth Control Pills Pregnancy And Lactation Text: This medication should be avoided if pregnant and for the first 30 days post-partum.
Tazorac Pregnancy And Lactation Text: This medication is not safe during pregnancy. It is unknown if this medication is excreted in breast milk.
Erythromycin Counseling:  I discussed with the patient the risks of erythromycin including but not limited to GI upset, allergic reaction, drug rash, diarrhea, increase in liver enzymes, and yeast infections.
Azithromycin Pregnancy And Lactation Text: This medication is considered safe during pregnancy and is also secreted in breast milk.
Topical Retinoid counseling:  Patient advised to apply a pea-sized amount only at bedtime and wait 30 minutes after washing their face before applying.  If too drying, patient may add a non-comedogenic moisturizer. The patient verbalized understanding of the proper use and possible adverse effects of retinoids.  All of the patient's questions and concerns were addressed.
High Dose Vitamin A Pregnancy And Lactation Text: High dose vitamin A therapy is contraindicated during pregnancy and breast feeding.
Spironolactone Pregnancy And Lactation Text: This medication can cause feminization of the male fetus and should be avoided during pregnancy. The active metabolite is also found in breast milk.
Topical Clindamycin Counseling: Patient counseled that this medication may cause skin irritation or allergic reactions.  In the event of skin irritation, the patient was advised to reduce the amount of the drug applied or use it less frequently.   The patient verbalized understanding of the proper use and possible adverse effects of clindamycin.  All of the patient's questions and concerns were addressed.
Dapsone Counseling: I discussed with the patient the risks of dapsone including but not limited to hemolytic anemia, agranulocytosis, rashes, methemoglobinemia, kidney failure, peripheral neuropathy, headaches, GI upset, and liver toxicity.  Patients who start dapsone require monitoring including baseline LFTs and weekly CBCs for the first month, then every month thereafter.  The patient verbalized understanding of the proper use and possible adverse effects of dapsone.  All of the patient's questions and concerns were addressed.
Bactrim Counseling:  I discussed with the patient the risks of sulfa antibiotics including but not limited to GI upset, allergic reaction, drug rash, diarrhea, dizziness, photosensitivity, and yeast infections.  Rarely, more serious reactions can occur including but not limited to aplastic anemia, agranulocytosis, methemoglobinemia, blood dyscrasias, liver or kidney failure, lung infiltrates or desquamative/blistering drug rashes.
Topical Clindamycin Pregnancy And Lactation Text: This medication is Pregnancy Category B and is considered safe during pregnancy. It is unknown if it is excreted in breast milk.
Tetracycline Counseling: Patient counseled regarding possible photosensitivity and increased risk for sunburn.  Patient instructed to avoid sunlight, if possible.  When exposed to sunlight, patients should wear protective clothing, sunglasses, and sunscreen.  The patient was instructed to call the office immediately if the following severe adverse effects occur:  hearing changes, easy bruising/bleeding, severe headache, or vision changes.  The patient verbalized understanding of the proper use and possible adverse effects of tetracycline.  All of the patient's questions and concerns were addressed. Patient understands to avoid pregnancy while on therapy due to potential birth defects.
Use Enhanced Medication Counseling?: No
Erythromycin Pregnancy And Lactation Text: This medication is Pregnancy Category B and is considered safe during pregnancy. It is also excreted in breast milk.
Dapsone Pregnancy And Lactation Text: This medication is Pregnancy Category C and is not considered safe during pregnancy or breast feeding.
Minocycline Counseling: Patient advised regarding possible photosensitivity and discoloration of the teeth, skin, lips, tongue and gums.  Patient instructed to avoid sunlight, if possible.  When exposed to sunlight, patients should wear protective clothing, sunglasses, and sunscreen.  The patient was instructed to call the office immediately if the following severe adverse effects occur:  hearing changes, easy bruising/bleeding, severe headache, or vision changes.  The patient verbalized understanding of the proper use and possible adverse effects of minocycline.  All of the patient's questions and concerns were addressed.
Topical Retinoid Pregnancy And Lactation Text: This medication is Pregnancy Category C. It is unknown if this medication is excreted in breast milk.
Minocycline Pregnancy And Lactation Text: This medication is Pregnancy Category D and not consider safe during pregnancy. It is also excreted in breast milk.
Isotretinoin Counseling: Patient should get monthly blood tests, not donate blood, not drive at night if vision affected, not share medication, and not undergo elective surgery for 6 months after tx completed. Side effects reviewed, pt to contact office should one occur.
Topical Sulfur Applications Counseling: Topical Sulfur Counseling: Patient counseled that this medication may cause skin irritation or allergic reactions.  In the event of skin irritation, the patient was advised to reduce the amount of the drug applied or use it less frequently.   The patient verbalized understanding of the proper use and possible adverse effects of topical sulfur application.  All of the patient's questions and concerns were addressed.
Bactrim Pregnancy And Lactation Text: This medication is Pregnancy Category D and is known to cause fetal risk.  It is also excreted in breast milk.
Detail Level: Simple
Birth Control Pills Counseling: Birth Control Pill Counseling: I discussed with the patient the potential side effects of OCPs including but not limited to increased risk of stroke, heart attack, thrombophlebitis, deep venous thrombosis, hepatic adenomas, breast changes, GI upset, headaches, and depression.  The patient verbalized understanding of the proper use and possible adverse effects of OCPs. All of the patient's questions and concerns were addressed.
Topical Sulfur Applications Pregnancy And Lactation Text: This medication is Pregnancy Category C and has an unknown safety profile during pregnancy. It is unknown if this topical medication is excreted in breast milk.
Tazorac Counseling:  Patient advised that medication is irritating and drying.  Patient may need to apply sparingly and wash off after an hour before eventually leaving it on overnight.  The patient verbalized understanding of the proper use and possible adverse effects of tazorac.  All of the patient's questions and concerns were addressed.
Doxycycline Counseling:  Patient counseled regarding possible photosensitivity and increased risk for sunburn.  Patient instructed to avoid sunlight, if possible.  When exposed to sunlight, patients should wear protective clothing, sunglasses, and sunscreen.  The patient was instructed to call the office immediately if the following severe adverse effects occur:  hearing changes, easy bruising/bleeding, severe headache, or vision changes.  The patient verbalized understanding of the proper use and possible adverse effects of doxycycline.  All of the patient's questions and concerns were addressed.
Detail Level: Detailed

## 2023-05-10 NOTE — PROCEDURE: DIAGNOSIS COMMENT
Comment: Patient is currently being worked up for scleroderma.
Detail Level: Simple
Render Risk Assessment In Note?: no

## 2023-06-07 ENCOUNTER — APPOINTMENT (RX ONLY)
Dept: URBAN - METROPOLITAN AREA CLINIC 4 | Facility: CLINIC | Age: 58
Setting detail: DERMATOLOGY
End: 2023-06-07

## 2023-06-07 DIAGNOSIS — R20.2 PARESTHESIA OF SKIN: ICD-10-CM

## 2023-06-07 DIAGNOSIS — L70.0 ACNE VULGARIS: ICD-10-CM

## 2023-06-07 PROBLEM — G56.03 CARPAL TUNNEL SYNDROME, BILATERAL UPPER LIMBS: Status: ACTIVE | Noted: 2023-06-07

## 2023-06-07 PROCEDURE — ? REFERRAL

## 2023-06-07 PROCEDURE — ? ADDITIONAL NOTES

## 2023-06-07 PROCEDURE — ? PRESCRIPTION

## 2023-06-07 PROCEDURE — ? COUNSELING

## 2023-06-07 PROCEDURE — ? DIAGNOSIS COMMENT

## 2023-06-07 PROCEDURE — 99213 OFFICE O/P EST LOW 20 MIN: CPT

## 2023-06-07 RX ORDER — TRETIONIN 0.25 MG/G
1 CREAM TOPICAL QHS
Qty: 20 | Refills: 2 | Status: ERX | COMMUNITY
Start: 2023-06-07

## 2023-06-07 RX ADMIN — TRETIONIN 1: 0.25 CREAM TOPICAL at 00:00

## 2023-06-07 ASSESSMENT — LOCATION ZONE DERM: LOCATION ZONE: TRUNK

## 2023-06-07 ASSESSMENT — LOCATION SIMPLE DESCRIPTION DERM: LOCATION SIMPLE: LEFT UPPER BACK

## 2023-06-07 ASSESSMENT — LOCATION DETAILED DESCRIPTION DERM: LOCATION DETAILED: LEFT SUPERIOR MEDIAL UPPER BACK

## 2023-06-07 NOTE — PROCEDURE: COUNSELING
Detail Level: Detailed
Bactrim Pregnancy And Lactation Text: This medication is Pregnancy Category D and is known to cause fetal risk.  It is also excreted in breast milk.
Erythromycin Counseling:  I discussed with the patient the risks of erythromycin including but not limited to GI upset, allergic reaction, drug rash, diarrhea, increase in liver enzymes, and yeast infections.
Birth Control Pills Pregnancy And Lactation Text: This medication should be avoided if pregnant and for the first 30 days post-partum.
Isotretinoin Counseling: Patient should get monthly blood tests, not donate blood, not drive at night if vision affected, not share medication, and not undergo elective surgery for 6 months after tx completed. Side effects reviewed, pt to contact office should one occur.
Azelaic Acid Counseling: Patient counseled that medicine may cause skin irritation and to avoid applying near the eyes.  In the event of skin irritation, the patient was advised to reduce the amount of the drug applied or use it less frequently.   The patient verbalized understanding of the proper use and possible adverse effects of azelaic acid.  All of the patient's questions and concerns were addressed.
Sarecycline Pregnancy And Lactation Text: This medication is Pregnancy Category D and not consider safe during pregnancy. It is also excreted in breast milk.
Winlevi Counseling:  I discussed with the patient the risks of topical clascoterone including but not limited to erythema, scaling, itching, and stinging. Patient voiced their understanding.
Aklief counseling:  Patient advised to apply a pea-sized amount only at bedtime and wait 30 minutes after washing their face before applying.  If too drying, patient may add a non-comedogenic moisturizer.  The most commonly reported side effects including irritation, redness, scaling, dryness, stinging, burning, itching, and increased risk of sunburn.  The patient verbalized understanding of the proper use and possible adverse effects of retinoids.  All of the patient's questions and concerns were addressed.
Topical Retinoid Pregnancy And Lactation Text: This medication is Pregnancy Category C. It is unknown if this medication is excreted in breast milk.
Tazorac Pregnancy And Lactation Text: This medication is not safe during pregnancy. It is unknown if this medication is excreted in breast milk.
Azithromycin Pregnancy And Lactation Text: This medication is considered safe during pregnancy and is also secreted in breast milk.
Doxycycline Counseling:  Patient counseled regarding possible photosensitivity and increased risk for sunburn.  Patient instructed to avoid sunlight, if possible.  When exposed to sunlight, patients should wear protective clothing, sunglasses, and sunscreen.  The patient was instructed to call the office immediately if the following severe adverse effects occur:  hearing changes, easy bruising/bleeding, severe headache, or vision changes.  The patient verbalized understanding of the proper use and possible adverse effects of doxycycline.  All of the patient's questions and concerns were addressed.
Tetracycline Counseling: Patient counseled regarding possible photosensitivity and increased risk for sunburn.  Patient instructed to avoid sunlight, if possible.  When exposed to sunlight, patients should wear protective clothing, sunglasses, and sunscreen.  The patient was instructed to call the office immediately if the following severe adverse effects occur:  hearing changes, easy bruising/bleeding, severe headache, or vision changes.  The patient verbalized understanding of the proper use and possible adverse effects of tetracycline.  All of the patient's questions and concerns were addressed. Patient understands to avoid pregnancy while on therapy due to potential birth defects.
High Dose Vitamin A Pregnancy And Lactation Text: High dose vitamin A therapy is contraindicated during pregnancy and breast feeding.
Azelaic Acid Pregnancy And Lactation Text: This medication is considered safe during pregnancy and breast feeding.
Dapsone Counseling: I discussed with the patient the risks of dapsone including but not limited to hemolytic anemia, agranulocytosis, rashes, methemoglobinemia, kidney failure, peripheral neuropathy, headaches, GI upset, and liver toxicity.  Patients who start dapsone require monitoring including baseline LFTs and weekly CBCs for the first month, then every month thereafter.  The patient verbalized understanding of the proper use and possible adverse effects of dapsone.  All of the patient's questions and concerns were addressed.
Isotretinoin Pregnancy And Lactation Text: This medication is Pregnancy Category X and is considered extremely dangerous during pregnancy. It is unknown if it is excreted in breast milk.
Spironolactone Counseling: Patient advised regarding risks of diarrhea, abdominal pain, hyperkalemia, birth defects (for female patients), liver toxicity and renal toxicity. The patient may need blood work to monitor liver and kidney function and potassium levels while on therapy. The patient verbalized understanding of the proper use and possible adverse effects of spironolactone.  All of the patient's questions and concerns were addressed.
Benzoyl Peroxide Pregnancy And Lactation Text: This medication is Pregnancy Category C. It is unknown if benzoyl peroxide is excreted in breast milk.
Topical Sulfur Applications Counseling: Topical Sulfur Counseling: Patient counseled that this medication may cause skin irritation or allergic reactions.  In the event of skin irritation, the patient was advised to reduce the amount of the drug applied or use it less frequently.   The patient verbalized understanding of the proper use and possible adverse effects of topical sulfur application.  All of the patient's questions and concerns were addressed.
Erythromycin Pregnancy And Lactation Text: This medication is Pregnancy Category B and is considered safe during pregnancy. It is also excreted in breast milk.
Birth Control Pills Counseling: Birth Control Pill Counseling: I discussed with the patient the potential side effects of OCPs including but not limited to increased risk of stroke, heart attack, thrombophlebitis, deep venous thrombosis, hepatic adenomas, breast changes, GI upset, headaches, and depression.  The patient verbalized understanding of the proper use and possible adverse effects of OCPs. All of the patient's questions and concerns were addressed.
Topical Clindamycin Counseling: Patient counseled that this medication may cause skin irritation or allergic reactions.  In the event of skin irritation, the patient was advised to reduce the amount of the drug applied or use it less frequently.   The patient verbalized understanding of the proper use and possible adverse effects of clindamycin.  All of the patient's questions and concerns were addressed.
Bactrim Counseling:  I discussed with the patient the risks of sulfa antibiotics including but not limited to GI upset, allergic reaction, drug rash, diarrhea, dizziness, photosensitivity, and yeast infections.  Rarely, more serious reactions can occur including but not limited to aplastic anemia, agranulocytosis, methemoglobinemia, blood dyscrasias, liver or kidney failure, lung infiltrates or desquamative/blistering drug rashes.
Minocycline Counseling: Patient advised regarding possible photosensitivity and discoloration of the teeth, skin, lips, tongue and gums.  Patient instructed to avoid sunlight, if possible.  When exposed to sunlight, patients should wear protective clothing, sunglasses, and sunscreen.  The patient was instructed to call the office immediately if the following severe adverse effects occur:  hearing changes, easy bruising/bleeding, severe headache, or vision changes.  The patient verbalized understanding of the proper use and possible adverse effects of minocycline.  All of the patient's questions and concerns were addressed.
Use Enhanced Medication Counseling?: No
Sarecycline Counseling: Patient advised regarding possible photosensitivity and discoloration of the teeth, skin, lips, tongue and gums.  Patient instructed to avoid sunlight, if possible.  When exposed to sunlight, patients should wear protective clothing, sunglasses, and sunscreen.  The patient was instructed to call the office immediately if the following severe adverse effects occur:  hearing changes, easy bruising/bleeding, severe headache, or vision changes.  The patient verbalized understanding of the proper use and possible adverse effects of sarecycline.  All of the patient's questions and concerns were addressed.
Aklief Pregnancy And Lactation Text: It is unknown if this medication is safe to use during pregnancy.  It is unknown if this medication is excreted in breast milk.  Breastfeeding women should use the topical cream on the smallest area of the skin for the shortest time needed while breastfeeding.  Do not apply to nipple and areola.
Tazorac Counseling:  Patient advised that medication is irritating and drying.  Patient may need to apply sparingly and wash off after an hour before eventually leaving it on overnight.  The patient verbalized understanding of the proper use and possible adverse effects of tazorac.  All of the patient's questions and concerns were addressed.
Winlevi Pregnancy And Lactation Text: This medication is considered safe during pregnancy and breastfeeding.
Doxycycline Pregnancy And Lactation Text: This medication is Pregnancy Category D and not consider safe during pregnancy. It is also excreted in breast milk but is considered safe for shorter treatment courses.
Dapsone Pregnancy And Lactation Text: This medication is Pregnancy Category C and is not considered safe during pregnancy or breast feeding.
Azithromycin Counseling:  I discussed with the patient the risks of azithromycin including but not limited to GI upset, allergic reaction, drug rash, diarrhea, and yeast infections.
Topical Retinoid counseling:  Patient advised to apply a pea-sized amount only at bedtime and wait 30 minutes after washing their face before applying.  If too drying, patient may add a non-comedogenic moisturizer. The patient verbalized understanding of the proper use and possible adverse effects of retinoids.  All of the patient's questions and concerns were addressed.
Topical Sulfur Applications Pregnancy And Lactation Text: This medication is Pregnancy Category C and has an unknown safety profile during pregnancy. It is unknown if this topical medication is excreted in breast milk.
Topical Clindamycin Pregnancy And Lactation Text: This medication is Pregnancy Category B and is considered safe during pregnancy. It is unknown if it is excreted in breast milk.
High Dose Vitamin A Counseling: Side effects reviewed, pt to contact office should one occur.
Benzoyl Peroxide Counseling: Patient counseled that medicine may cause skin irritation and bleach clothing.  In the event of skin irritation, the patient was advised to reduce the amount of the drug applied or use it less frequently.   The patient verbalized understanding of the proper use and possible adverse effects of benzoyl peroxide.  All of the patient's questions and concerns were addressed.
Spironolactone Pregnancy And Lactation Text: This medication can cause feminization of the male fetus and should be avoided during pregnancy. The active metabolite is also found in breast milk.

## 2023-06-07 NOTE — PROCEDURE: ADDITIONAL NOTES
Render Risk Assessment In Note?: no
Detail Level: Detailed
Additional Notes: Advised patient to present to orthopedics for further evaluation. Discussed she may need imaging and emg to rule out carpal tunnel syndrome. Equivocal phalen’s test and negative Tinel’s sign.

## 2023-06-07 NOTE — PROCEDURE: DIAGNOSIS COMMENT
Detail Level: Simple
Render Risk Assessment In Note?: no
Comment: All labs ordered by PCP are within normal limits. Discussed with patient that labs i was able to review does not cover rheumatoid arthritis.  Favoring that pruritus is secondary to degenerative discs in cervical spine.  Patient has spinal scoliosis, and lumbar DDD.  She has numbness in both hands that is worse at night.  I recommended seeing her spine specialist for further work up and possible nerve conduction test to rule out carpal tunnel syndrome.   She will call and give me an update of her progress.  Skin is clear today.

## 2023-06-07 NOTE — HPI: OTHER
Condition:: Patient presents to review recent lab work that was ordered by primary care to rule out Sjögrens.
Please Describe Your Condition:: is an established patient who is being seen for a chief complaint of dry eyes, receding gums, joint pain, numbness in fingertips, and itchy skin. She is following up to review recent labs that were ordered by her PCP to rule out Sjogren’s.

## 2024-06-10 ENCOUNTER — APPOINTMENT (RX ONLY)
Dept: URBAN - METROPOLITAN AREA CLINIC 4 | Facility: CLINIC | Age: 59
Setting detail: DERMATOLOGY
End: 2024-06-10

## 2024-06-10 DIAGNOSIS — D18.0 HEMANGIOMA: ICD-10-CM

## 2024-06-10 DIAGNOSIS — R20.2 PARESTHESIA OF SKIN: ICD-10-CM

## 2024-06-10 DIAGNOSIS — D22 MELANOCYTIC NEVI: ICD-10-CM

## 2024-06-10 DIAGNOSIS — L82.1 OTHER SEBORRHEIC KERATOSIS: ICD-10-CM

## 2024-06-10 DIAGNOSIS — L70.0 ACNE VULGARIS: ICD-10-CM

## 2024-06-10 DIAGNOSIS — Z71.89 OTHER SPECIFIED COUNSELING: ICD-10-CM

## 2024-06-10 DIAGNOSIS — L81.4 OTHER MELANIN HYPERPIGMENTATION: ICD-10-CM

## 2024-06-10 PROBLEM — D18.01 HEMANGIOMA OF SKIN AND SUBCUTANEOUS TISSUE: Status: ACTIVE | Noted: 2024-06-10

## 2024-06-10 PROBLEM — D48.5 NEOPLASM OF UNCERTAIN BEHAVIOR OF SKIN: Status: ACTIVE | Noted: 2024-06-10

## 2024-06-10 PROBLEM — D22.5 MELANOCYTIC NEVI OF TRUNK: Status: ACTIVE | Noted: 2024-06-10

## 2024-06-10 PROCEDURE — ? COUNSELING

## 2024-06-10 PROCEDURE — ? BIOPSY BY SHAVE METHOD

## 2024-06-10 PROCEDURE — 11102 TANGNTL BX SKIN SINGLE LES: CPT

## 2024-06-10 PROCEDURE — 99214 OFFICE O/P EST MOD 30 MIN: CPT | Mod: 25

## 2024-06-10 PROCEDURE — ? MEDICATION COUNSELING

## 2024-06-10 PROCEDURE — ? PRESCRIPTION

## 2024-06-10 RX ORDER — CLINDAMYCIN PHOSPHATE 10 MG/ML
LOTION TOPICAL
Qty: 60 | Refills: 3 | Status: ERX | COMMUNITY
Start: 2024-06-10

## 2024-06-10 RX ORDER — TRETIONIN 0.5 MG/G
CREAM TOPICAL
Qty: 45 | Refills: 3 | Status: ERX | COMMUNITY
Start: 2024-06-10

## 2024-06-10 RX ADMIN — CLINDAMYCIN PHOSPHATE: 10 LOTION TOPICAL at 00:00

## 2024-06-10 RX ADMIN — TRETIONIN: 0.5 CREAM TOPICAL at 00:00

## 2024-06-10 ASSESSMENT — LOCATION ZONE DERM: LOCATION ZONE: TRUNK

## 2024-06-10 ASSESSMENT — LOCATION DETAILED DESCRIPTION DERM
LOCATION DETAILED: RIGHT INFERIOR UPPER BACK
LOCATION DETAILED: RIGHT MID-UPPER BACK
LOCATION DETAILED: RIGHT SUPERIOR MEDIAL UPPER BACK
LOCATION DETAILED: UPPER STERNUM
LOCATION DETAILED: LEFT SUPERIOR UPPER BACK
LOCATION DETAILED: LEFT MEDIAL SUPERIOR CHEST

## 2024-06-10 ASSESSMENT — LOCATION SIMPLE DESCRIPTION DERM
LOCATION SIMPLE: RIGHT UPPER BACK
LOCATION SIMPLE: CHEST
LOCATION SIMPLE: LEFT UPPER BACK

## 2024-06-10 NOTE — PROCEDURE: COUNSELING
Detail Level: Zone
Sunscreen Recommendations: I recommend a zinc or titanium based sunscreen for daily wear.  I recommend 30 spf  or greater.
Detail Level: Generalized
Detail Level: Detailed
Bactrim Pregnancy And Lactation Text: This medication is Pregnancy Category D and is known to cause fetal risk.  It is also excreted in breast milk.
Erythromycin Counseling:  I discussed with the patient the risks of erythromycin including but not limited to GI upset, allergic reaction, drug rash, diarrhea, increase in liver enzymes, and yeast infections.
Birth Control Pills Pregnancy And Lactation Text: This medication should be avoided if pregnant and for the first 30 days post-partum.
Isotretinoin Counseling: Patient should get monthly blood tests, not donate blood, not drive at night if vision affected, not share medication, and not undergo elective surgery for 6 months after tx completed. Side effects reviewed, pt to contact office should one occur.
Azelaic Acid Counseling: Patient counseled that medicine may cause skin irritation and to avoid applying near the eyes.  In the event of skin irritation, the patient was advised to reduce the amount of the drug applied or use it less frequently.   The patient verbalized understanding of the proper use and possible adverse effects of azelaic acid.  All of the patient's questions and concerns were addressed.
Sarecycline Pregnancy And Lactation Text: This medication is Pregnancy Category D and not consider safe during pregnancy. It is also excreted in breast milk.
Winlevi Counseling:  I discussed with the patient the risks of topical clascoterone including but not limited to erythema, scaling, itching, and stinging. Patient voiced their understanding.
Aklief counseling:  Patient advised to apply a pea-sized amount only at bedtime and wait 30 minutes after washing their face before applying.  If too drying, patient may add a non-comedogenic moisturizer.  The most commonly reported side effects including irritation, redness, scaling, dryness, stinging, burning, itching, and increased risk of sunburn.  The patient verbalized understanding of the proper use and possible adverse effects of retinoids.  All of the patient's questions and concerns were addressed.
Topical Retinoid Pregnancy And Lactation Text: This medication is Pregnancy Category C. It is unknown if this medication is excreted in breast milk.
Tazorac Pregnancy And Lactation Text: This medication is not safe during pregnancy. It is unknown if this medication is excreted in breast milk.
Azithromycin Pregnancy And Lactation Text: This medication is considered safe during pregnancy and is also secreted in breast milk.
Doxycycline Counseling:  Patient counseled regarding possible photosensitivity and increased risk for sunburn.  Patient instructed to avoid sunlight, if possible.  When exposed to sunlight, patients should wear protective clothing, sunglasses, and sunscreen.  The patient was instructed to call the office immediately if the following severe adverse effects occur:  hearing changes, easy bruising/bleeding, severe headache, or vision changes.  The patient verbalized understanding of the proper use and possible adverse effects of doxycycline.  All of the patient's questions and concerns were addressed.
Tetracycline Counseling: Patient counseled regarding possible photosensitivity and increased risk for sunburn.  Patient instructed to avoid sunlight, if possible.  When exposed to sunlight, patients should wear protective clothing, sunglasses, and sunscreen.  The patient was instructed to call the office immediately if the following severe adverse effects occur:  hearing changes, easy bruising/bleeding, severe headache, or vision changes.  The patient verbalized understanding of the proper use and possible adverse effects of tetracycline.  All of the patient's questions and concerns were addressed. Patient understands to avoid pregnancy while on therapy due to potential birth defects.
High Dose Vitamin A Pregnancy And Lactation Text: High dose vitamin A therapy is contraindicated during pregnancy and breast feeding.
Azelaic Acid Pregnancy And Lactation Text: This medication is considered safe during pregnancy and breast feeding.
Dapsone Counseling: I discussed with the patient the risks of dapsone including but not limited to hemolytic anemia, agranulocytosis, rashes, methemoglobinemia, kidney failure, peripheral neuropathy, headaches, GI upset, and liver toxicity.  Patients who start dapsone require monitoring including baseline LFTs and weekly CBCs for the first month, then every month thereafter.  The patient verbalized understanding of the proper use and possible adverse effects of dapsone.  All of the patient's questions and concerns were addressed.
Isotretinoin Pregnancy And Lactation Text: This medication is Pregnancy Category X and is considered extremely dangerous during pregnancy. It is unknown if it is excreted in breast milk.
Spironolactone Counseling: Patient advised regarding risks of diarrhea, abdominal pain, hyperkalemia, birth defects (for female patients), liver toxicity and renal toxicity. The patient may need blood work to monitor liver and kidney function and potassium levels while on therapy. The patient verbalized understanding of the proper use and possible adverse effects of spironolactone.  All of the patient's questions and concerns were addressed.
Benzoyl Peroxide Pregnancy And Lactation Text: This medication is Pregnancy Category C. It is unknown if benzoyl peroxide is excreted in breast milk.
Topical Sulfur Applications Counseling: Topical Sulfur Counseling: Patient counseled that this medication may cause skin irritation or allergic reactions.  In the event of skin irritation, the patient was advised to reduce the amount of the drug applied or use it less frequently.   The patient verbalized understanding of the proper use and possible adverse effects of topical sulfur application.  All of the patient's questions and concerns were addressed.
Erythromycin Pregnancy And Lactation Text: This medication is Pregnancy Category B and is considered safe during pregnancy. It is also excreted in breast milk.
Birth Control Pills Counseling: Birth Control Pill Counseling: I discussed with the patient the potential side effects of OCPs including but not limited to increased risk of stroke, heart attack, thrombophlebitis, deep venous thrombosis, hepatic adenomas, breast changes, GI upset, headaches, and depression.  The patient verbalized understanding of the proper use and possible adverse effects of OCPs. All of the patient's questions and concerns were addressed.
Topical Clindamycin Counseling: Patient counseled that this medication may cause skin irritation or allergic reactions.  In the event of skin irritation, the patient was advised to reduce the amount of the drug applied or use it less frequently.   The patient verbalized understanding of the proper use and possible adverse effects of clindamycin.  All of the patient's questions and concerns were addressed.
Bactrim Counseling:  I discussed with the patient the risks of sulfa antibiotics including but not limited to GI upset, allergic reaction, drug rash, diarrhea, dizziness, photosensitivity, and yeast infections.  Rarely, more serious reactions can occur including but not limited to aplastic anemia, agranulocytosis, methemoglobinemia, blood dyscrasias, liver or kidney failure, lung infiltrates or desquamative/blistering drug rashes.
Minocycline Counseling: Patient advised regarding possible photosensitivity and discoloration of the teeth, skin, lips, tongue and gums.  Patient instructed to avoid sunlight, if possible.  When exposed to sunlight, patients should wear protective clothing, sunglasses, and sunscreen.  The patient was instructed to call the office immediately if the following severe adverse effects occur:  hearing changes, easy bruising/bleeding, severe headache, or vision changes.  The patient verbalized understanding of the proper use and possible adverse effects of minocycline.  All of the patient's questions and concerns were addressed.
Use Enhanced Medication Counseling?: No
Sarecycline Counseling: Patient advised regarding possible photosensitivity and discoloration of the teeth, skin, lips, tongue and gums.  Patient instructed to avoid sunlight, if possible.  When exposed to sunlight, patients should wear protective clothing, sunglasses, and sunscreen.  The patient was instructed to call the office immediately if the following severe adverse effects occur:  hearing changes, easy bruising/bleeding, severe headache, or vision changes.  The patient verbalized understanding of the proper use and possible adverse effects of sarecycline.  All of the patient's questions and concerns were addressed.
Aklief Pregnancy And Lactation Text: It is unknown if this medication is safe to use during pregnancy.  It is unknown if this medication is excreted in breast milk.  Breastfeeding women should use the topical cream on the smallest area of the skin for the shortest time needed while breastfeeding.  Do not apply to nipple and areola.
Tazorac Counseling:  Patient advised that medication is irritating and drying.  Patient may need to apply sparingly and wash off after an hour before eventually leaving it on overnight.  The patient verbalized understanding of the proper use and possible adverse effects of tazorac.  All of the patient's questions and concerns were addressed.
Winlevi Pregnancy And Lactation Text: This medication is considered safe during pregnancy and breastfeeding.
Doxycycline Pregnancy And Lactation Text: This medication is Pregnancy Category D and not consider safe during pregnancy. It is also excreted in breast milk but is considered safe for shorter treatment courses.
Dapsone Pregnancy And Lactation Text: This medication is Pregnancy Category C and is not considered safe during pregnancy or breast feeding.
Azithromycin Counseling:  I discussed with the patient the risks of azithromycin including but not limited to GI upset, allergic reaction, drug rash, diarrhea, and yeast infections.
Topical Retinoid counseling:  Patient advised to apply a pea-sized amount only at bedtime and wait 30 minutes after washing their face before applying.  If too drying, patient may add a non-comedogenic moisturizer. The patient verbalized understanding of the proper use and possible adverse effects of retinoids.  All of the patient's questions and concerns were addressed.
Topical Sulfur Applications Pregnancy And Lactation Text: This medication is Pregnancy Category C and has an unknown safety profile during pregnancy. It is unknown if this topical medication is excreted in breast milk.
Topical Clindamycin Pregnancy And Lactation Text: This medication is Pregnancy Category B and is considered safe during pregnancy. It is unknown if it is excreted in breast milk.
High Dose Vitamin A Counseling: Side effects reviewed, pt to contact office should one occur.
Benzoyl Peroxide Counseling: Patient counseled that medicine may cause skin irritation and bleach clothing.  In the event of skin irritation, the patient was advised to reduce the amount of the drug applied or use it less frequently.   The patient verbalized understanding of the proper use and possible adverse effects of benzoyl peroxide.  All of the patient's questions and concerns were addressed.
Spironolactone Pregnancy And Lactation Text: This medication can cause feminization of the male fetus and should be avoided during pregnancy. The active metabolite is also found in breast milk.

## 2024-06-10 NOTE — PROCEDURE: MEDICATION COUNSELING
Cyclophosphamide Pregnancy And Lactation Text: This medication is Pregnancy Category D and it isn't considered safe during pregnancy. This medication is excreted in breast milk.
Topical Steroids Applications Pregnancy And Lactation Text: Most topical steroids are considered safe to use during pregnancy and lactation.  Any topical steroid applied to the breast or nipple should be washed off before breastfeeding.
Infliximab Pregnancy And Lactation Text: This medication is Pregnancy Category B and is considered safe during pregnancy. It is unknown if this medication is excreted in breast milk.
Birth Control Pills Counseling: Birth Control Pill Counseling: I discussed with the patient the potential side effects of OCPs including but not limited to increased risk of stroke, heart attack, thrombophlebitis, deep venous thrombosis, hepatic adenomas, breast changes, GI upset, headaches, and depression.  The patient verbalized understanding of the proper use and possible adverse effects of OCPs. All of the patient's questions and concerns were addressed.
Rifampin Pregnancy And Lactation Text: This medication is Pregnancy Category C and it isn't know if it is safe during pregnancy. It is also excreted in breast milk and should not be used if you are breast feeding.
Enbrel Counseling:  I discussed with the patient the risks of etanercept including but not limited to myelosuppression, immunosuppression, autoimmune hepatitis, demyelinating diseases, lymphoma, and infections.  The patient understands that monitoring is required including a PPD at baseline and must alert us or the primary physician if symptoms of infection or other concerning signs are noted.
Zoryve Counseling:  I discussed with the patient that Zoryve is not for use in the eyes, mouth or vagina. The most commonly reported side effects include diarrhea, headache, insomnia, application site pain, upper respiratory tract infections, and urinary tract infections.  All of the patient's questions and concerns were addressed.
Colchicine Pregnancy And Lactation Text: This medication is Pregnancy Category C and isn't considered safe during pregnancy. It is excreted in breast milk.
Stelara Counseling:  I discussed with the patient the risks of ustekinumab including but not limited to immunosuppression, malignancy, posterior leukoencephalopathy syndrome, and serious infections.  The patient understands that monitoring is required including a PPD at baseline and must alert us or the primary physician if symptoms of infection or other concerning signs are noted.
Qbrexza Counseling:  I discussed with the patient the risks of Qbrexza including but not limited to headache, mydriasis, blurred vision, dry eyes, nasal dryness, dry mouth, dry throat, dry skin, urinary hesitation, and constipation.  Local skin reactions including erythema, burning, stinging, and itching can also occur.
Thalidomide Pregnancy And Lactation Text: This medication is Pregnancy Category X and is absolutely contraindicated during pregnancy. It is unknown if it is excreted in breast milk.
Litfulo Counseling: I discussed with the patient the risks of Litfulo therapy including but not limited to upper respiratory tract infections, shingles, cold sores, and nausea. Live vaccines should be avoided.  This medication has been linked to serious infections; higher rate of mortality; malignancy and lymphoproliferative disorders; major adverse cardiovascular events; thrombosis; gastrointestinal perforations; neutropenia; lymphopenia; anemia; liver enzyme elevations; and lipid elevations.
Doxepin Pregnancy And Lactation Text: This medication is Pregnancy Category C and it isn't known if it is safe during pregnancy. It is also excreted in breast milk and breast feeding isn't recommended.
Bactrim Pregnancy And Lactation Text: This medication is Pregnancy Category D and is known to cause fetal risk.  It is also excreted in breast milk.
Odomzo Counseling- I discussed with the patient the risks of Odomzo including but not limited to nausea, vomiting, diarrhea, constipation, weight loss, changes in the sense of taste, decreased appetite, muscle spasms, and hair loss.  The patient verbalized understanding of the proper use and possible adverse effects of Odomzo.  All of the patient's questions and concerns were addressed.
Taltz Pregnancy And Lactation Text: The risk during pregnancy and breastfeeding is uncertain with this medication.
Otezla Counseling: The side effects of Otezla were discussed with the patient, including but not limited to worsening or new depression, weight loss, diarrhea, nausea, upper respiratory tract infection, and headache. Patient instructed to call the office should any adverse effect occur.  The patient verbalized understanding of the proper use and possible adverse effects of Otezla.  All the patient's questions and concerns were addressed.
Zoryve Pregnancy And Lactation Text: It is unknown if this medication can cause problems during pregnancy and breastfeeding.
Aklief counseling:  Patient advised to apply a pea-sized amount only at bedtime and wait 30 minutes after washing their face before applying.  If too drying, patient may add a non-comedogenic moisturizer.  The most commonly reported side effects including irritation, redness, scaling, dryness, stinging, burning, itching, and increased risk of sunburn.  The patient verbalized understanding of the proper use and possible adverse effects of retinoids.  All of the patient's questions and concerns were addressed.
Low Dose Naltrexone Pregnancy And Lactation Text: Naltrexone is pregnancy category C.  There have been no adequate and well-controlled studies in pregnant women.  It should be used in pregnancy only if the potential benefit justifies the potential risk to the fetus.   Limited data indicates that naltrexone is minimally excreted into breastmilk.
Minoxidil Pregnancy And Lactation Text: This medication has not been assigned a Pregnancy Risk Category but animal studies failed to show danger with the topical medication. It is unknown if the medication is excreted in breast milk.
Birth Control Pills Pregnancy And Lactation Text: This medication should be avoided if pregnant and for the first 30 days post-partum.
Metronidazole Counseling:  I discussed with the patient the risks of metronidazole including but not limited to seizures, nausea/vomiting, a metallic taste in the mouth, nausea/vomiting and severe allergy.
Tazorac Counseling:  Patient advised that medication is irritating and drying.  Patient may need to apply sparingly and wash off after an hour before eventually leaving it on overnight.  The patient verbalized understanding of the proper use and possible adverse effects of tazorac.  All of the patient's questions and concerns were addressed.
Adbry Pregnancy And Lactation Text: It is unknown if this medication will adversely affect pregnancy or breast feeding.
Eucrisa Counseling: Patient may experience a mild burning sensation during topical application. Eucrisa is not approved in children less than 3 months of age.
Itraconazole Counseling:  I discussed with the patient the risks of itraconazole including but not limited to liver damage, nausea/vomiting, neuropathy, and severe allergy.  The patient understands that this medication is best absorbed when taken with acidic beverages such as non-diet cola or ginger ale.  The patient understands that monitoring is required including baseline LFTs and repeat LFTs at intervals.  The patient understands that they are to contact us or the primary physician if concerning signs are noted.
Acitretin Counseling:  I discussed with the patient the risks of acitretin including but not limited to hair loss, dry lips/skin/eyes, liver damage, hyperlipidemia, depression/suicidal ideation, photosensitivity.  Serious rare side effects can include but are not limited to pancreatitis, pseudotumor cerebri, bony changes, clot formation/stroke/heart attack.  Patient understands that alcohol is contraindicated since it can result in liver toxicity and significantly prolong the elimination of the drug by many years.
Dapsone Counseling: I discussed with the patient the risks of dapsone including but not limited to hemolytic anemia, agranulocytosis, rashes, methemoglobinemia, kidney failure, peripheral neuropathy, headaches, GI upset, and liver toxicity.  Patients who start dapsone require monitoring including baseline LFTs and weekly CBCs for the first month, then every month thereafter.  The patient verbalized understanding of the proper use and possible adverse effects of dapsone.  All of the patient's questions and concerns were addressed.
Tranexamic Acid Counseling:  Patient advised of the small risk of bleeding problems with tranexamic acid. They were also instructed to call if they developed any nausea, vomiting or diarrhea. All of the patient's questions and concerns were addressed.
Mirvaso Counseling: Mirvaso is a topical medication which can decrease superficial blood flow where applied. Side effects are uncommon and include stinging, redness and allergic reactions.
Cyclosporine Counseling:  I discussed with the patient the risks of cyclosporine including but not limited to hypertension, gingival hyperplasia,myelosuppression, immunosuppression, liver damage, kidney damage, neurotoxicity, lymphoma, and serious infections. The patient understands that monitoring is required including baseline blood pressure, CBC, CMP, lipid panel and uric acid, and then 1-2 times monthly CMP and blood pressure.
Sarecycline Counseling: Patient advised regarding possible photosensitivity and discoloration of the teeth, skin, lips, tongue and gums.  Patient instructed to avoid sunlight, if possible.  When exposed to sunlight, patients should wear protective clothing, sunglasses, and sunscreen.  The patient was instructed to call the office immediately if the following severe adverse effects occur:  hearing changes, easy bruising/bleeding, severe headache, or vision changes.  The patient verbalized understanding of the proper use and possible adverse effects of sarecycline.  All of the patient's questions and concerns were addressed.
Bimzelx Counseling:  I discussed with the patient the risks of Bimzelx including but not limited to depression, immunosuppression, allergic reactions and infections.  The patient understands that monitoring is required including a PPD at baseline and must alert us or the primary physician if symptoms of infection or other concerning signs are noted.
Tremfya Counseling: I discussed with the patient the risks of guselkumab including but not limited to immunosuppression, serious infections, and drug reactions.  The patient understands that monitoring is required including a PPD at baseline and must alert us or the primary physician if symptoms of infection or other concerning signs are noted.
Qbrexza Pregnancy And Lactation Text: There is no available data on Qbrexza use in pregnant women.  There is no available data on Qbrexza use in lactation.
Otezla Pregnancy And Lactation Text: This medication is Pregnancy Category C and it isn't known if it is safe during pregnancy. It is unknown if it is excreted in breast milk.
Litfulo Pregnancy And Lactation Text: Based on animal studies, Lifulo may cause embryo-fetal harm when administered to pregnant women.  The medication should not be used in pregnancy.  Breastfeeding is not recommended during treatment.
Topical Sulfur Applications Counseling: Topical Sulfur Counseling: Patient counseled that this medication may cause skin irritation or allergic reactions.  In the event of skin irritation, the patient was advised to reduce the amount of the drug applied or use it less frequently.   The patient verbalized understanding of the proper use and possible adverse effects of topical sulfur application.  All of the patient's questions and concerns were addressed.
Rituxan Counseling:  I discussed with the patient the risks of Rituxan infusions. Side effects can include infusion reactions, severe drug rashes including mucocutaneous reactions, reactivation of latent hepatitis and other infections and rarely progressive multifocal leukoencephalopathy.  All of the patient's questions and concerns were addressed.
Taltz Counseling: I discussed with the patient the risks of ixekizumab including but not limited to immunosuppression, serious infections, worsening of inflammatory bowel disease and drug reactions.  The patient understands that monitoring is required including a PPD at baseline and must alert us or the primary physician if symptoms of infection or other concerning signs are noted.
Zyclara Counseling:  I discussed with the patient the risks of imiquimod including but not limited to erythema, scaling, itching, weeping, crusting, and pain.  Patient understands that the inflammatory response to imiquimod is variable from person to person and was educated regarded proper titration schedule.  If flu-like symptoms develop, patient knows to discontinue the medication and contact us.
Aklief Pregnancy And Lactation Text: It is unknown if this medication is safe to use during pregnancy.  It is unknown if this medication is excreted in breast milk.  Breastfeeding women should use the topical cream on the smallest area of the skin for the shortest time needed while breastfeeding.  Do not apply to nipple and areola.
Rhofade Counseling: Rhofade is a topical medication which can decrease superficial blood flow where applied. Side effects are uncommon and include stinging, redness and allergic reactions.
Calcipotriene Counseling:  I discussed with the patient the risks of calcipotriene including but not limited to erythema, scaling, itching, and irritation.
Olumiant Counseling: I discussed with the patient the risks of Olumiant therapy including but not limited to upper respiratory tract infections, shingles, cold sores, and nausea. Live vaccines should be avoided.  This medication has been linked to serious infections; higher rate of mortality; malignancy and lymphoproliferative disorders; major adverse cardiovascular events; thrombosis; gastrointestinal perforations; neutropenia; lymphopenia; anemia; liver enzyme elevations; and lipid elevations.
Rituxan Pregnancy And Lactation Text: This medication is Pregnancy Category C and it isn't know if it is safe during pregnancy. It is unknown if this medication is excreted in breast milk but similar antibodies are known to be excreted.
Itraconazole Pregnancy And Lactation Text: This medication is Pregnancy Category C and it isn't know if it is safe during pregnancy. It is also excreted in breast milk.
Tazorac Pregnancy And Lactation Text: This medication is not safe during pregnancy. It is unknown if this medication is excreted in breast milk.
Tranexamic Acid Pregnancy And Lactation Text: It is unknown if this medication is safe during pregnancy or breast feeding.
Hydroxyzine Counseling: Patient advised that the medication is sedating and not to drive a car after taking this medication.  Patient informed of potential adverse effects including but not limited to dry mouth, urinary retention, and blurry vision.  The patient verbalized understanding of the proper use and possible adverse effects of hydroxyzine.  All of the patient's questions and concerns were addressed.
Cephalexin Counseling: I counseled the patient regarding use of cephalexin as an antibiotic for prophylactic and/or therapeutic purposes. Cephalexin (commonly prescribed under brand name Keflex) is a cephalosporin antibiotic which is active against numerous classes of bacteria, including most skin bacteria. Side effects may include nausea, diarrhea, gastrointestinal upset, rash, hives, yeast infections, and in rare cases, hepatitis, kidney disease, seizures, fever, confusion, neurologic symptoms, and others. Patients with severe allergies to penicillin medications are cautioned that there is about a 10% incidence of cross-reactivity with cephalosporins. When possible, patients with penicillin allergies should use alternatives to cephalosporins for antibiotic therapy.
Cyclosporine Pregnancy And Lactation Text: This medication is Pregnancy Category C and it isn't know if it is safe during pregnancy. This medication is excreted in breast milk.
Spironolactone Counseling: Patient advised regarding risks of diarrhea, abdominal pain, hyperkalemia, birth defects (for female patients), liver toxicity and renal toxicity. The patient may need blood work to monitor liver and kidney function and potassium levels while on therapy. The patient verbalized understanding of the proper use and possible adverse effects of spironolactone.  All of the patient's questions and concerns were addressed.
Niacinamide Counseling: I recommended taking niacin or niacinamide, also know as vitamin B3, twice daily. Recent evidence suggests that taking vitamin B3 (500 mg twice daily) can reduce the risk of actinic keratoses and non-melanoma skin cancers. Side effects of vitamin B3 include flushing and headache.
Bimzelx Pregnancy And Lactation Text: This medication crosses the placenta and the safety is uncertain during pregnancy. It is unknown if this medication is present in breast milk.
Calcipotriene Pregnancy And Lactation Text: The use of this medication during pregnancy or lactation is not recommended as there is insufficient data.
Sarecycline Pregnancy And Lactation Text: This medication is Pregnancy Category D and not consider safe during pregnancy. It is also excreted in breast milk.
Topical Clindamycin Counseling: Patient counseled that this medication may cause skin irritation or allergic reactions.  In the event of skin irritation, the patient was advised to reduce the amount of the drug applied or use it less frequently.   The patient verbalized understanding of the proper use and possible adverse effects of clindamycin.  All of the patient's questions and concerns were addressed.
Dapsone Pregnancy And Lactation Text: This medication is Pregnancy Category C and is not considered safe during pregnancy or breast feeding.
Ketoconazole Counseling:   Patient counseled regarding improving absorption with orange juice.  Adverse effects include but are not limited to breast enlargement, headache, diarrhea, nausea, upset stomach, liver function test abnormalities, taste disturbance, and stomach pain.  There is a rare possibility of liver failure that can occur when taking ketoconazole. The patient understands that monitoring of LFTs may be required, especially at baseline. The patient verbalized understanding of the proper use and possible adverse effects of ketoconazole.  All of the patient's questions and concerns were addressed.
Hydroxyzine Pregnancy And Lactation Text: This medication is not safe during pregnancy and should not be taken. It is also excreted in breast milk and breast feeding isn't recommended.
Topical Sulfur Applications Pregnancy And Lactation Text: This medication is considered safe during pregnancy and breast feeding secondary to limited systemic absorption.
Humira Counseling:  I discussed with the patient the risks of adalimumab including but not limited to myelosuppression, immunosuppression, autoimmune hepatitis, demyelinating diseases, lymphoma, and serious infections.  The patient understands that monitoring is required including a PPD at baseline and must alert us or the primary physician if symptoms of infection or other concerning signs are noted.
Acitretin Pregnancy And Lactation Text: This medication is Pregnancy Category X and should not be given to women who are pregnant or may become pregnant in the future. This medication is excreted in breast milk.
Oxybutynin Counseling:  I discussed with the patient the risks of oxybutynin including but not limited to skin rash, drowsiness, dry mouth, difficulty urinating, and blurred vision.
Mirvaso Pregnancy And Lactation Text: This medication has not been assigned a Pregnancy Risk Category. It is unknown if the medication is excreted in breast milk.
Detail Level: Simple
Siliq Counseling:  I discussed with the patient the risks of Siliq including but not limited to new or worsening depression, suicidal thoughts and behavior, immunosuppression, malignancy, posterior leukoencephalopathy syndrome, and serious infections.  The patient understands that monitoring is required including a PPD at baseline and must alert us or the primary physician if symptoms of infection or other concerning signs are noted. There is also a special program designed to monitor depression which is required with Siliq.
Methotrexate Counseling:  Patient counseled regarding adverse effects of methotrexate including but not limited to nausea, vomiting, abnormalities in liver function tests. Patients may develop mouth sores, rash, diarrhea, and abnormalities in blood counts. The patient understands that monitoring is required including LFT's and blood counts.  There is a rare possibility of scarring of the liver and lung problems that can occur when taking methotrexate. Persistent nausea, loss of appetite, pale stools, dark urine, cough, and shortness of breath should be reported immediately. Patient advised to discontinue methotrexate treatment at least three months before attempting to become pregnant.  I discussed the need for folate supplements while taking methotrexate.  These supplements can decrease side effects during methotrexate treatment. The patient verbalized understanding of the proper use and possible adverse effects of methotrexate.  All of the patient's questions and concerns were addressed.
Niacinamide Pregnancy And Lactation Text: These medications are considered safe during pregnancy.
Spironolactone Pregnancy And Lactation Text: This medication can cause feminization of the male fetus and should be avoided during pregnancy. The active metabolite is also found in breast milk.
Olumiant Pregnancy And Lactation Text: Based on animal studies, Olumiant may cause embryo-fetal harm when administered to pregnant women.  The medication should not be used in pregnancy.  Breastfeeding is not recommended during treatment.
Gabapentin Counseling: I discussed with the patient the risks of gabapentin including but not limited to dizziness, somnolence, fatigue and ataxia.
Dutasteride Male Counseling: Dustasteride Counseling:  I discussed with the patient the risks of use of dutasteride including but not limited to decreased libido, decreased ejaculate volume, and gynecomastia. Women who can become pregnant should not handle medication.  All of the patient's questions and concerns were addressed.
Hydroquinone Counseling:  Patient advised that medication may result in skin irritation, lightening (hypopigmentation), dryness, and burning.  In the event of skin irritation, the patient was advised to reduce the amount of the drug applied or use it less frequently.  Rarely, spots that are treated with hydroquinone can become darker (pseudoochronosis).  Should this occur, patient instructed to stop medication and call the office. The patient verbalized understanding of the proper use and possible adverse effects of hydroquinone.  All of the patient's questions and concerns were addressed.
Cephalexin Pregnancy And Lactation Text: This medication is Pregnancy Category B and considered safe during pregnancy.  It is also excreted in breast milk but can be used safely for shorter doses.
Azelaic Acid Counseling: Patient counseled that medicine may cause skin irritation and to avoid applying near the eyes.  In the event of skin irritation, the patient was advised to reduce the amount of the drug applied or use it less frequently.   The patient verbalized understanding of the proper use and possible adverse effects of azelaic acid.  All of the patient's questions and concerns were addressed.
Cantharidin Counseling:  I discussed with the patient the risks of Cantharidin including but not limited to pain, redness, burning, itching, and blistering.
Zyclara Pregnancy And Lactation Text: This medication is Pregnancy Category C. It is unknown if this medication is excreted in breast milk.
Valtrex Counseling: I discussed with the patient the risks of valacyclovir including but not limited to kidney damage, nausea, vomiting and severe allergy.  The patient understands that if the infection seems to be worsening or is not improving, they are to call.
Topical Clindamycin Pregnancy And Lactation Text: This medication is Pregnancy Category B and is considered safe during pregnancy. It is unknown if it is excreted in breast milk.
Xolair Counseling:  Patient informed of potential adverse effects including but not limited to fever, muscle aches, rash and allergic reactions.  The patient verbalized understanding of the proper use and possible adverse effects of Xolair.  All of the patient's questions and concerns were addressed.
Clindamycin Counseling: I counseled the patient regarding use of clindamycin as an antibiotic for prophylactic and/or therapeutic purposes. Clindamycin is active against numerous classes of bacteria, including skin bacteria. Side effects may include nausea, diarrhea, gastrointestinal upset, rash, hives, yeast infections, and in rare cases, colitis.
Metronidazole Pregnancy And Lactation Text: This medication is Pregnancy Category B and considered safe during pregnancy.  It is also excreted in breast milk.
Azelaic Acid Pregnancy And Lactation Text: This medication is considered safe during pregnancy and breast feeding.
Opzelura Counseling:  I discussed with the patient the risks of Opzelura including but not limited to nasopharngitis, bronchitis, ear infection, eosinophila, hives, diarrhea, folliculitis, tonsillitis, and rhinorrhea.  Taken orally, this medication has been linked to serious infections; higher rate of mortality; malignancy and lymphoproliferative disorders; major adverse cardiovascular events; thrombosis; thrombocytopenia, anemia, and neutropenia; and lipid elevations.
Bexarotene Counseling:  I discussed with the patient the risks of bexarotene including but not limited to hair loss, dry lips/skin/eyes, liver abnormalities, hyperlipidemia, pancreatitis, depression/suicidal ideation, photosensitivity, drug rash/allergic reactions, hypothyroidism, anemia, leukopenia, infection, cataracts, and teratogenicity.  Patient understands that they will need regular blood tests to check lipid profile, liver function tests, white blood cell count, thyroid function tests and pregnancy test if applicable.
Nsaids Counseling: NSAID Counseling: I discussed with the patient that NSAIDs should be taken with food. Prolonged use of NSAIDs can result in the development of stomach ulcers.  Patient advised to stop taking NSAIDs if abdominal pain occurs.  The patient verbalized understanding of the proper use and possible adverse effects of NSAIDs.  All of the patient's questions and concerns were addressed.
Wartpeel Counseling:  I discussed with the patient the risks of Wartpeel including but not limited to erythema, scaling, itching, weeping, crusting, and pain.
Cimzia Counseling:  I discussed with the patient the risks of Cimzia including but not limited to immunosuppression, allergic reactions and infections.  The patient understands that monitoring is required including a PPD at baseline and must alert us or the primary physician if symptoms of infection or other concerning signs are noted.
Tetracycline Counseling: Patient counseled regarding possible photosensitivity and increased risk for sunburn.  Patient instructed to avoid sunlight, if possible.  When exposed to sunlight, patients should wear protective clothing, sunglasses, and sunscreen.  The patient was instructed to call the office immediately if the following severe adverse effects occur:  hearing changes, easy bruising/bleeding, severe headache, or vision changes.  The patient verbalized understanding of the proper use and possible adverse effects of tetracycline.  All of the patient's questions and concerns were addressed. Patient understands to avoid pregnancy while on therapy due to potential birth defects.
Cantharidin Pregnancy And Lactation Text: This medication has not been proven safe during pregnancy. It is unknown if this medication is excreted in breast milk.
Ketoconazole Pregnancy And Lactation Text: This medication is Pregnancy Category C and it isn't know if it is safe during pregnancy. It is also excreted in breast milk and breast feeding isn't recommended.
Rinvoq Counseling: I discussed with the patient the risks of Rinvoq therapy including but not limited to upper respiratory tract infections, shingles, cold sores, bronchitis, nausea, cough, fever, acne, and headache. Live vaccines should be avoided.  This medication has been linked to serious infections; higher rate of mortality; malignancy and lymphoproliferative disorders; major adverse cardiovascular events; thrombosis; thrombocytopenia, anemia, and neutropenia; lipid elevations; liver enzyme elevations; and gastrointestinal perforations.
Opzelura Pregnancy And Lactation Text: There is insufficient data to evaluate drug-associated risk for major birth defects, miscarriage, or other adverse maternal or fetal outcomes.  There is a pregnancy registry that monitors pregnancy outcomes in pregnant persons exposed to the medication during pregnancy.  It is unknown if this medication is excreted in breast milk.  Do not breastfeed during treatment and for about 4 weeks after the last dose.
Bexarotene Pregnancy And Lactation Text: This medication is Pregnancy Category X and should not be given to women who are pregnant or may become pregnant. This medication should not be used if you are breast feeding.
Wartpeel Pregnancy And Lactation Text: This medication is Pregnancy Category X and contraindicated in pregnancy and in women who may become pregnant. It is unknown if this medication is excreted in breast milk.
Albendazole Counseling:  I discussed with the patient the risks of albendazole including but not limited to cytopenia, kidney damage, nausea/vomiting and severe allergy.  The patient understands that this medication is being used in an off-label manner.
Valtrex Pregnancy And Lactation Text: this medication is Pregnancy Category B and is considered safe during pregnancy. This medication is not directly found in breast milk but it's metabolite acyclovir is present.
Opioid Counseling: I discussed with the patient the potential side effects of opioids including but not limited to addiction, altered mental status, and depression. I stressed avoiding alcohol, benzodiazepines, muscle relaxants and sleep aids unless specifically okayed by a physician. The patient verbalized understanding of the proper use and possible adverse effects of opioids. All of the patient's questions and concerns were addressed. They were instructed to flush the remaining pills down the toilet if they did not need them for pain.
Hyrimoz Counseling:  I discussed with the patient the risks of adalimumab including but not limited to myelosuppression, immunosuppression, autoimmune hepatitis, demyelinating diseases, lymphoma, and serious infections.  The patient understands that monitoring is required including a PPD at baseline and must alert us or the primary physician if symptoms of infection or other concerning signs are noted.
Cimzia Pregnancy And Lactation Text: This medication crosses the placenta but can be considered safe in certain situations. Cimzia may be excreted in breast milk.
Methotrexate Pregnancy And Lactation Text: This medication is Pregnancy Category X and is known to cause fetal harm. This medication is excreted in breast milk.
Solaraze Counseling:  I discussed with the patient the risks of Solaraze including but not limited to erythema, scaling, itching, weeping, crusting, and pain.
5-Fu Counseling: 5-Fluorouracil Counseling:  I discussed with the patient the risks of 5-fluorouracil including but not limited to erythema, scaling, itching, weeping, crusting, and pain.
Propranolol Counseling:  I discussed with the patient the risks of propranolol including but not limited to low heart rate, low blood pressure, low blood sugar, restlessness and increased cold sensitivity. They should call the office if they experience any of these side effects.
Include Pregnancy/Lactation Warning?: No
Azathioprine Counseling:  I discussed with the patient the risks of azathioprine including but not limited to myelosuppression, immunosuppression, hepatotoxicity, lymphoma, and infections.  The patient understands that monitoring is required including baseline LFTs, Creatinine, possible TPMP genotyping and weekly CBCs for the first month and then every 2 weeks thereafter.  The patient verbalized understanding of the proper use and possible adverse effects of azathioprine.  All of the patient's questions and concerns were addressed.
Dutasteride Female Counseling: Dutasteride Counseling:  I discussed with the patient the risks of use of dutasteride including but not limited to decreased libido and sexual dysfunction. Explained the teratogenic nature of the medication and stressed the importance of not getting pregnant during treatment. All of the patient's questions and concerns were addressed.
Clindamycin Pregnancy And Lactation Text: This medication can be used in pregnancy if certain situations. Clindamycin is also present in breast milk.
Benzoyl Peroxide Counseling: Patient counseled that medicine may cause skin irritation and bleach clothing.  In the event of skin irritation, the patient was advised to reduce the amount of the drug applied or use it less frequently.   The patient verbalized understanding of the proper use and possible adverse effects of benzoyl peroxide.  All of the patient's questions and concerns were addressed.
Minocycline Counseling: Patient advised regarding possible photosensitivity and discoloration of the teeth, skin, lips, tongue and gums.  Patient instructed to avoid sunlight, if possible.  When exposed to sunlight, patients should wear protective clothing, sunglasses, and sunscreen.  The patient was instructed to call the office immediately if the following severe adverse effects occur:  hearing changes, easy bruising/bleeding, severe headache, or vision changes.  The patient verbalized understanding of the proper use and possible adverse effects of minocycline.  All of the patient's questions and concerns were addressed.
Solaraze Pregnancy And Lactation Text: This medication is Pregnancy Category B and is considered safe. There is some data to suggest avoiding during the third trimester. It is unknown if this medication is excreted in breast milk.
Xolair Pregnancy And Lactation Text: This medication is Pregnancy Category B and is considered safe during pregnancy. This medication is excreted in breast milk.
Terbinafine Counseling: Patient counseling regarding adverse effects of terbinafine including but not limited to headache, diarrhea, rash, upset stomach, liver function test abnormalities, itching, taste/smell disturbance, nausea, abdominal pain, and flatulence.  There is a rare possibility of liver failure that can occur when taking terbinafine.  The patient understands that a baseline LFT and kidney function test may be required. The patient verbalized understanding of the proper use and possible adverse effects of terbinafine.  All of the patient's questions and concerns were addressed.
Topical Ketoconazole Counseling: Patient counseled that this medication may cause skin irritation or allergic reactions.  In the event of skin irritation, the patient was advised to reduce the amount of the drug applied or use it less frequently.   The patient verbalized understanding of the proper use and possible adverse effects of ketoconazole.  All of the patient's questions and concerns were addressed.
Prednisone Counseling:  I discussed with the patient the risks of prolonged use of prednisone including but not limited to weight gain, insomnia, osteoporosis, mood changes, diabetes, susceptibility to infection, glaucoma and high blood pressure.  In cases where prednisone use is prolonged, patients should be monitored with blood pressure checks, serum glucose levels and an eye exam.  Additionally, the patient may need to be placed on GI prophylaxis, PCP prophylaxis, and calcium and vitamin D supplementation and/or a bisphosphonate.  The patient verbalized understanding of the proper use and the possible adverse effects of prednisone.  All of the patient's questions and concerns were addressed.
Imiquimod Counseling:  I discussed with the patient the risks of imiquimod including but not limited to erythema, scaling, itching, weeping, crusting, and pain.  Patient understands that the inflammatory response to imiquimod is variable from person to person and was educated regarded proper titration schedule.  If flu-like symptoms develop, patient knows to discontinue the medication and contact us.
Nsaids Pregnancy And Lactation Text: These medications are considered safe up to 30 weeks gestation. It is excreted in breast milk.
Azathioprine Pregnancy And Lactation Text: This medication is Pregnancy Category D and isn't considered safe during pregnancy. It is unknown if this medication is excreted in breast milk.
Terbinafine Pregnancy And Lactation Text: This medication is Pregnancy Category B and is considered safe during pregnancy. It is also excreted in breast milk and breast feeding isn't recommended.
Glycopyrrolate Counseling:  I discussed with the patient the risks of glycopyrrolate including but not limited to skin rash, drowsiness, dry mouth, difficulty urinating, and blurred vision.
Dutasteride Pregnancy And Lactation Text: This medication is absolutely contraindicated in women, especially during pregnancy and breast feeding. Feminization of male fetuses is possible if taking while pregnant.
Cosentyx Counseling:  I discussed with the patient the risks of Cosentyx including but not limited to worsening of Crohn's disease, immunosuppression, allergic reactions and infections.  The patient understands that monitoring is required including a PPD at baseline and must alert us or the primary physician if symptoms of infection or other concerning signs are noted.
Rinvoq Pregnancy And Lactation Text: Based on animal studies, Rinvoq may cause embryo-fetal harm when administered to pregnant women.  The medication should not be used in pregnancy.  Breastfeeding is not recommended during treatment and for 6 days after the last dose.
Winlevi Counseling:  I discussed with the patient the risks of topical clascoterone including but not limited to erythema, scaling, itching, and stinging. Patient voiced their understanding.
Simponi Counseling:  I discussed with the patient the risks of golimumab including but not limited to myelosuppression, immunosuppression, autoimmune hepatitis, demyelinating diseases, lymphoma, and serious infections.  The patient understands that monitoring is required including a PPD at baseline and must alert us or the primary physician if symptoms of infection or other concerning signs are noted.
Opioid Pregnancy And Lactation Text: These medications can lead to premature delivery and should be avoided during pregnancy. These medications are also present in breast milk in small amounts.
Isotretinoin Counseling: Patient should get monthly blood tests, not donate blood, not drive at night if vision affected, not share medication, and not undergo elective surgery for 6 months after tx completed. Side effects reviewed, pt to contact office should one occur.
Picato Counseling:  I discussed with the patient the risks of Picato including but not limited to erythema, scaling, itching, weeping, crusting, and pain.
Propranolol Pregnancy And Lactation Text: This medication is Pregnancy Category C and it isn't known if it is safe during pregnancy. It is excreted in breast milk.
Albendazole Pregnancy And Lactation Text: This medication is Pregnancy Category C and it isn't known if it is safe during pregnancy. It is also excreted in breast milk.
Benzoyl Peroxide Pregnancy And Lactation Text: This medication is Pregnancy Category C. It is unknown if benzoyl peroxide is excreted in breast milk.
Soolantra Counseling: I discussed with the patients the risks of topial Soolantra. This is a medicine which decreases the number of mites and inflammation in the skin. You experience burning, stinging, eye irritation or allergic reactions.  Please call our office if you develop any problems from using this medication.
Olanzapine Counseling- I discussed with the patient the common side effects of olanzapine including but are not limited to: lack of energy, dry mouth, increased appetite, sleepiness, tremor, constipation, dizziness, changes in behavior, or restlessness.  Explained that teenagers are more likely to experience headaches, abdominal pain, pain in the arms or legs, tiredness, and sleepiness.  Serious side effects include but are not limited: increased risk of death in elderly patients who are confused, have memory loss, or dementia-related psychosis; hyperglycemia; increased cholesterol and triglycerides; and weight gain.
Drysol Counseling:  I discussed with the patient the risks of drysol/aluminum chloride including but not limited to skin rash, itching, irritation, burning.
Sotyktu Counseling:  I discussed the most common side effects of Sotyktu including: common cold, sore throat, sinus infections, cold sores, canker sores, folliculitis, and acne.? I also discussed more serious side effects of Sotyktu including but not limited to: serious allergic reactions; increased risk for infections such as TB; cancers such as lymphomas; rhabdomyolysis and elevated CPK; and elevated triglycerides and liver enzymes.?
Winlevi Pregnancy And Lactation Text: This medication is considered safe during pregnancy and breastfeeding.
Isotretinoin Pregnancy And Lactation Text: This medication is Pregnancy Category X and is considered extremely dangerous during pregnancy. It is unknown if it is excreted in breast milk.
Glycopyrrolate Pregnancy And Lactation Text: This medication is Pregnancy Category B and is considered safe during pregnancy. It is unknown if it is excreted breast milk.
Finasteride Male Counseling: Finasteride Counseling:  I discussed with the patient the risks of use of finasteride including but not limited to decreased libido, decreased ejaculate volume, gynecomastia, and depression. Women should not handle medication.  All of the patient's questions and concerns were addressed.
Doxycycline Counseling:  Patient counseled regarding possible photosensitivity and increased risk for sunburn.  Patient instructed to avoid sunlight, if possible.  When exposed to sunlight, patients should wear protective clothing, sunglasses, and sunscreen.  The patient was instructed to call the office immediately if the following severe adverse effects occur:  hearing changes, easy bruising/bleeding, severe headache, or vision changes.  The patient verbalized understanding of the proper use and possible adverse effects of doxycycline.  All of the patient's questions and concerns were addressed.
Erivedge Counseling- I discussed with the patient the risks of Erivedge including but not limited to nausea, vomiting, diarrhea, constipation, weight loss, changes in the sense of taste, decreased appetite, muscle spasms, and hair loss.  The patient verbalized understanding of the proper use and possible adverse effects of Erivedge.  All of the patient's questions and concerns were addressed.
Fluconazole Counseling:  Patient counseled regarding adverse effects of fluconazole including but not limited to headache, diarrhea, nausea, upset stomach, liver function test abnormalities, taste disturbance, and stomach pain.  There is a rare possibility of liver failure that can occur when taking fluconazole.  The patient understands that monitoring of LFTs and kidney function test may be required, especially at baseline. The patient verbalized understanding of the proper use and possible adverse effects of fluconazole.  All of the patient's questions and concerns were addressed.
Klisyri Counseling:  I discussed with the patient the risks of Klisyri including but not limited to erythema, scaling, itching, weeping, crusting, and pain.
Topical Metronidazole Counseling: Metronidazole is a topical antibiotic medication. You may experience burning, stinging, redness, or allergic reactions.  Please call our office if you develop any problems from using this medication.
Doxycycline Pregnancy And Lactation Text: This medication is Pregnancy Category D and not consider safe during pregnancy. It is also excreted in breast milk but is considered safe for shorter treatment courses.
SSKI Counseling:  I discussed with the patient the risks of SSKI including but not limited to thyroid abnormalities, metallic taste, GI upset, fever, headache, acne, arthralgias, paraesthesias, lymphadenopathy, easy bleeding, arrhythmias, and allergic reaction.
Cellcept Counseling:  I discussed with the patient the risks of mycophenolate mofetil including but not limited to infection/immunosuppression, GI upset, hypokalemia, hypercholesterolemia, bone marrow suppression, lymphoproliferative disorders, malignancy, GI ulceration/bleed/perforation, colitis, interstitial lung disease, kidney failure, progressive multifocal leukoencephalopathy, and birth defects.  The patient understands that monitoring is required including a baseline creatinine and regular CBC testing. In addition, patient must alert us immediately if symptoms of infection or other concerning signs are noted.
Quinolones Counseling:  I discussed with the patient the risks of fluoroquinolones including but not limited to GI upset, allergic reaction, drug rash, diarrhea, dizziness, photosensitivity, yeast infections, liver function test abnormalities, tendonitis/tendon rupture.
Ivermectin Counseling:  Patient instructed to take medication on an empty stomach with a full glass of water.  Patient informed of potential adverse effects including but not limited to nausea, diarrhea, dizziness, itching, and swelling of the extremities or lymph nodes.  The patient verbalized understanding of the proper use and possible adverse effects of ivermectin.  All of the patient's questions and concerns were addressed.
Olanzapine Pregnancy And Lactation Text: This medication is pregnancy category C.   There are no adequate and well controlled trials with olanzapine in pregnant females.  Olanzapine should be used during pregnancy only if the potential benefit justifies the potential risk to the fetus.   In a study in lactating healthy women, olanzapine was excreted in breast milk.  It is recommended that women taking olanzapine should not breast feed.
Ilumya Counseling: I discussed with the patient the risks of tildrakizumab including but not limited to immunosuppression, malignancy, posterior leukoencephalopathy syndrome, and serious infections.  The patient understands that monitoring is required including a PPD at baseline and must alert us or the primary physician if symptoms of infection or other concerning signs are noted.
Sotyktu Pregnancy And Lactation Text: There is insufficient data to evaluate whether or not Sotyktu is safe to use during pregnancy.? ?It is not known if Sotyktu passes into breast milk and whether or not it is safe to use when breastfeeding.??
Cimetidine Counseling:  I discussed with the patient the risks of Cimetidine including but not limited to gynecomastia, headache, diarrhea, nausea, drowsiness, arrhythmias, pancreatitis, skin rashes, psychosis, bone marrow suppression and kidney toxicity.
Azithromycin Counseling:  I discussed with the patient the risks of azithromycin including but not limited to GI upset, allergic reaction, drug rash, diarrhea, and yeast infections.
High Dose Vitamin A Counseling: Side effects reviewed, pt to contact office should one occur.
Protopic Counseling: Patient may experience a mild burning sensation during topical application. Protopic is not approved in children less than 2 years of age. There have been case reports of hematologic and skin malignancies in patients using topical calcineurin inhibitors although causality is questionable.
Skyrizi Counseling: I discussed with the patient the risks of risankizumab-rzaa including but not limited to immunosuppression, and serious infections.  The patient understands that monitoring is required including a PPD at baseline and must alert us or the primary physician if symptoms of infection or other concerning signs are noted.
VTAMA Counseling: I discussed with the patient that VTAMA is not for use in the eyes, mouth or mouth. They should call the office if they develop any signs of allergic reactions to VTAMA. The patient verbalized understanding of the proper use and possible adverse effects of VTAMA.  All of the patient's questions and concerns were addressed.
Carac Counseling:  I discussed with the patient the risks of Carac including but not limited to erythema, scaling, itching, weeping, crusting, and pain.
Hydroxychloroquine Counseling:  I discussed with the patient that a baseline ophthalmologic exam is needed at the start of therapy and every year thereafter while on therapy. A CBC may also be warranted for monitoring.  The side effects of this medication were discussed with the patient, including but not limited to agranulocytosis, aplastic anemia, seizures, rashes, retinopathy, and liver toxicity. Patient instructed to call the office should any adverse effect occur.  The patient verbalized understanding of the proper use and possible adverse effects of Plaquenil.  All the patient's questions and concerns were addressed.
Finasteride Female Counseling: Finasteride Counseling:  I discussed with the patient the risks of use of finasteride including but not limited to decreased libido and sexual dysfunction. Explained the teratogenic nature of the medication and stressed the importance of not getting pregnant during treatment. All of the patient's questions and concerns were addressed.
Arava Counseling:  Patient counseled regarding adverse effects of Arava including but not limited to nausea, vomiting, abnormalities in liver function tests. Patients may develop mouth sores, rash, diarrhea, and abnormalities in blood counts. The patient understands that monitoring is required including LFTs and blood counts.  There is a rare possibility of scarring of the liver and lung problems that can occur when taking methotrexate. Persistent nausea, loss of appetite, pale stools, dark urine, cough, and shortness of breath should be reported immediately. Patient advised to discontinue Arava treatment and consult with a physician prior to attempting conception. The patient will have to undergo a treatment to eliminate Arava from the body prior to conception.
Soolantra Pregnancy And Lactation Text: This medication is Pregnancy Category C. This medication is considered safe during breast feeding.
Sski Pregnancy And Lactation Text: This medication is Pregnancy Category D and isn't considered safe during pregnancy. It is excreted in breast milk.
Cibinqo Counseling: I discussed with the patient the risks of Cibinqo therapy including but not limited to common cold, nausea, headache, cold sores, increased blood CPK levels, dizziness, UTIs, fatigue, acne, and vomitting. Live vaccines should be avoided.  This medication has been linked to serious infections; higher rate of mortality; malignancy and lymphoproliferative disorders; major adverse cardiovascular events; thrombosis; thrombocytopenia and lymphopenia; lipid elevations; and retinal detachment.
Dupixent Counseling: I discussed with the patient the risks of dupilumab including but not limited to eye infection and irritation, cold sores, injection site reactions, worsening of asthma, allergic reactions and increased risk of parasitic infection.  Live vaccines should be avoided while taking dupilumab. Dupilumab will also interact with certain medications such as warfarin and cyclosporine. The patient understands that monitoring is required and they must alert us or the primary physician if symptoms of infection or other concerning signs are noted.
Topical Metronidazole Pregnancy And Lactation Text: This medication is Pregnancy Category B and considered safe during pregnancy.  It is also considered safe to use while breastfeeding.
Oral Minoxidil Counseling- I discussed with the patient the risks of oral minoxidil including but not limited to shortness of breath, swelling of the feet or ankles, dizziness, lightheadedness, unwanted hair growth and allergic reaction.  The patient verbalized understanding of the proper use and possible adverse effects of oral minoxidil.  All of the patient's questions and concerns were addressed.
Klisyri Pregnancy And Lactation Text: It is unknown if this medication can harm a developing fetus or if it is excreted in breast milk.
Erythromycin Counseling:  I discussed with the patient the risks of erythromycin including but not limited to GI upset, allergic reaction, drug rash, diarrhea, increase in liver enzymes, and yeast infections.
Infliximab Counseling:  I discussed with the patient the risks of infliximab including but not limited to myelosuppression, immunosuppression, autoimmune hepatitis, demyelinating diseases, lymphoma, and serious infections.  The patient understands that monitoring is required including a PPD at baseline and must alert us or the primary physician if symptoms of infection or other concerning signs are noted.
High Dose Vitamin A Pregnancy And Lactation Text: High dose vitamin A therapy is contraindicated during pregnancy and breast feeding.
Protopic Pregnancy And Lactation Text: This medication is Pregnancy Category C. It is unknown if this medication is excreted in breast milk when applied topically.
Finasteride Pregnancy And Lactation Text: This medication is absolutely contraindicated during pregnancy. It is unknown if it is excreted in breast milk.
Hydroxychloroquine Pregnancy And Lactation Text: This medication has been shown to cause fetal harm but it isn't assigned a Pregnancy Risk Category. There are small amounts excreted in breast milk.
Topical Steroids Counseling: I discussed with the patient that prolonged use of topical steroids can result in the increased appearance of superficial blood vessels (telangiectasias), lightening (hypopigmentation) and thinning of the skin (atrophy).  Patient understands to avoid using high potency steroids in skin folds, the groin or the face.  The patient verbalized understanding of the proper use and possible adverse effects of topical steroids.  All of the patient's questions and concerns were addressed.
Libtayo Counseling- I discussed with the patient the risks of Libtayo including but not limited to nausea, vomiting, diarrhea, and bone or muscle pain.  The patient verbalized understanding of the proper use and possible adverse effects of Libtayo.  All of the patient's questions and concerns were addressed.
Dupixent Pregnancy And Lactation Text: This medication likely crosses the placenta but the risk for the fetus is uncertain. This medication is excreted in breast milk.
Colchicine Counseling:  Patient counseled regarding adverse effects including but not limited to stomach upset (nausea, vomiting, stomach pain, or diarrhea).  Patient instructed to limit alcohol consumption while taking this medication.  Colchicine may reduce blood counts especially with prolonged use.  The patient understands that monitoring of kidney function and blood counts may be required, especially at baseline. The patient verbalized understanding of the proper use and possible adverse effects of colchicine.  All of the patient's questions and concerns were addressed.
Topical Retinoid counseling:  Patient advised to apply a pea-sized amount only at bedtime and wait 30 minutes after washing their face before applying.  If too drying, patient may add a non-comedogenic moisturizer. The patient verbalized understanding of the proper use and possible adverse effects of retinoids.  All of the patient's questions and concerns were addressed.
Elidel Counseling: Patient may experience a mild burning sensation during topical application. Elidel is not approved in children less than 2 years of age. There have been case reports of hematologic and skin malignancies in patients using topical calcineurin inhibitors although causality is questionable.
Thalidomide Counseling: I discussed with the patient the risks of thalidomide including but not limited to birth defects, anxiety, weakness, chest pain, dizziness, cough and severe allergy.
Cibinqo Pregnancy And Lactation Text: It is unknown if this medication will adversely affect pregnancy or breast feeding.  You should not take this medication if you are currently pregnant or planning a pregnancy or while breastfeeding.
Xeljanz Counseling: I discussed with the patient the risks of Xeljanz therapy including increased risk of infection, liver issues, headache, diarrhea, or cold symptoms. Live vaccines should be avoided. They were instructed to call if they have any problems.
Griseofulvin Counseling:  I discussed with the patient the risks of griseofulvin including but not limited to photosensitivity, cytopenia, liver damage, nausea/vomiting and severe allergy.  The patient understands that this medication is best absorbed when taken with a fatty meal (e.g., ice cream or french fries).
Azithromycin Pregnancy And Lactation Text: This medication is considered safe during pregnancy and is also secreted in breast milk.
Cyclophosphamide Counseling:  I discussed with the patient the risks of cyclophosphamide including but not limited to hair loss, hormonal abnormalities, decreased fertility, abdominal pain, diarrhea, nausea and vomiting, bone marrow suppression and infection. The patient understands that monitoring is required while taking this medication.
Clofazimine Counseling:  I discussed with the patient the risks of clofazimine including but not limited to skin and eye pigmentation, liver damage, nausea/vomiting, gastrointestinal bleeding and allergy.
Griseofulvin Pregnancy And Lactation Text: This medication is Pregnancy Category X and is known to cause serious birth defects. It is unknown if this medication is excreted in breast milk but breast feeding should be avoided.
Xeledouardz Pregnancy And Lactation Text: This medication is Pregnancy Category D and is not considered safe during pregnancy.  The risk during breast feeding is also uncertain.
Bactrim Counseling:  I discussed with the patient the risks of sulfa antibiotics including but not limited to GI upset, allergic reaction, drug rash, diarrhea, dizziness, photosensitivity, and yeast infections.  Rarely, more serious reactions can occur including but not limited to aplastic anemia, agranulocytosis, methemoglobinemia, blood dyscrasias, liver or kidney failure, lung infiltrates or desquamative/blistering drug rashes.
Doxepin Counseling:  Patient advised that the medication is sedating and not to drive a car after taking this medication. Patient informed of potential adverse effects including but not limited to dry mouth, urinary retention, and blurry vision.  The patient verbalized understanding of the proper use and possible adverse effects of doxepin.  All of the patient's questions and concerns were addressed.
Low Dose Naltrexone Counseling- I discussed with the patient the potential risks and side effects of low dose naltrexone including but not limited to: more vivid dreams, headaches, nausea, vomiting, abdominal pain, fatigue, dizziness, and anxiety.
Minoxidil Counseling: Minoxidil is a topical medication which can increase blood flow where it is applied. It is uncertain how this medication increases hair growth. Side effects are uncommon and include stinging and allergic reactions.
Adbry Counseling: I discussed with the patient the risks of tralokinumab including but not limited to eye infection and irritation, cold sores, injection site reactions, worsening of asthma, allergic reactions and increased risk of parasitic infection.  Live vaccines should be avoided while taking tralokinumab. The patient understands that monitoring is required and they must alert us or the primary physician if symptoms of infection or other concerning signs are noted.
Erythromycin Pregnancy And Lactation Text: This medication is Pregnancy Category B and is considered safe during pregnancy. It is also excreted in breast milk.
Libtayo Pregnancy And Lactation Text: This medication is contraindicated in pregnancy and when breast feeding.
Rifampin Counseling: I discussed with the patient the risks of rifampin including but not limited to liver damage, kidney damage, red-orange body fluids, nausea/vomiting and severe allergy.
Oral Minoxidil Pregnancy And Lactation Text: This medication should only be used when clearly needed if you are pregnant, attempting to become pregnant or breast feeding.

## 2024-06-18 ENCOUNTER — RX ONLY (OUTPATIENT)
Age: 59
Setting detail: RX ONLY
End: 2024-06-18

## 2024-06-18 RX ORDER — CLINDAMYCIN PHOSPHATE 10 MG/ML
SOLUTION TOPICAL
Qty: 60 | Refills: 0 | Status: ERX | COMMUNITY
Start: 2024-06-18

## 2025-06-11 ENCOUNTER — APPOINTMENT (OUTPATIENT)
Dept: URBAN - METROPOLITAN AREA CLINIC 4 | Facility: CLINIC | Age: 60
Setting detail: DERMATOLOGY
End: 2025-06-11

## 2025-06-11 DIAGNOSIS — R20.2 PARESTHESIA OF SKIN: ICD-10-CM

## 2025-06-11 DIAGNOSIS — L82.1 OTHER SEBORRHEIC KERATOSIS: ICD-10-CM

## 2025-06-11 DIAGNOSIS — L85.3 XEROSIS CUTIS: ICD-10-CM

## 2025-06-11 DIAGNOSIS — D18.0 HEMANGIOMA: ICD-10-CM

## 2025-06-11 DIAGNOSIS — L70.0 ACNE VULGARIS: ICD-10-CM

## 2025-06-11 DIAGNOSIS — Z71.89 OTHER SPECIFIED COUNSELING: ICD-10-CM

## 2025-06-11 DIAGNOSIS — L81.4 OTHER MELANIN HYPERPIGMENTATION: ICD-10-CM

## 2025-06-11 DIAGNOSIS — L57.0 ACTINIC KERATOSIS: ICD-10-CM

## 2025-06-11 DIAGNOSIS — L82.0 INFLAMED SEBORRHEIC KERATOSIS: ICD-10-CM

## 2025-06-11 PROBLEM — D18.01 HEMANGIOMA OF SKIN AND SUBCUTANEOUS TISSUE: Status: ACTIVE | Noted: 2025-06-11

## 2025-06-11 PROCEDURE — ? ADDITIONAL NOTES

## 2025-06-11 PROCEDURE — ? LIQUID NITROGEN

## 2025-06-11 PROCEDURE — ? COUNSELING

## 2025-06-11 PROCEDURE — ? SUNSCREEN RECOMMENDATIONS

## 2025-06-11 PROCEDURE — ? PRESCRIPTION

## 2025-06-11 RX ORDER — TRETIONIN 0.5 MG/G
CREAM TOPICAL
Qty: 45 | Refills: 3 | Status: ERX

## 2025-06-11 RX ORDER — AMMONIUM LACTATE 120 MG/G
CREAM TOPICAL QD
Qty: 385 | Refills: 2 | Status: ERX | COMMUNITY
Start: 2025-06-11

## 2025-06-11 RX ORDER — CLASCOTERONE 1 G/100G
CREAM TOPICAL BID
Qty: 60 | Refills: 2 | Status: ERX | COMMUNITY
Start: 2025-06-11

## 2025-06-11 RX ADMIN — AMMONIUM LACTATE: 120 CREAM TOPICAL at 00:00

## 2025-06-11 RX ADMIN — CLASCOTERONE: 1 CREAM TOPICAL at 00:00

## 2025-06-11 ASSESSMENT — LOCATION DETAILED DESCRIPTION DERM
LOCATION DETAILED: RIGHT RADIAL DORSAL HAND
LOCATION DETAILED: INFERIOR THORACIC SPINE
LOCATION DETAILED: LEFT MEDIAL MALAR CHEEK
LOCATION DETAILED: RIGHT ANTERIOR DISTAL THIGH
LOCATION DETAILED: PERIUMBILICAL SKIN
LOCATION DETAILED: RIGHT INFERIOR MEDIAL MALAR CHEEK
LOCATION DETAILED: LEFT ANTERIOR DISTAL THIGH
LOCATION DETAILED: RIGHT CENTRAL MALAR CHEEK
LOCATION DETAILED: RIGHT MEDIAL SUPERIOR CHEST
LOCATION DETAILED: LEFT DISTAL POSTERIOR THIGH
LOCATION DETAILED: RIGHT PROXIMAL DORSAL FOREARM
LOCATION DETAILED: LEFT ULNAR DORSAL HAND
LOCATION DETAILED: LEFT DISTAL POSTERIOR UPPER ARM
LOCATION DETAILED: RIGHT DISTAL POSTERIOR THIGH
LOCATION DETAILED: RIGHT DISTAL POSTERIOR UPPER ARM
LOCATION DETAILED: LEFT CENTRAL MALAR CHEEK
LOCATION DETAILED: RIGHT MID-UPPER BACK
LOCATION DETAILED: RIGHT INFERIOR MEDIAL UPPER BACK
LOCATION DETAILED: RIGHT INFERIOR MEDIAL MIDBACK
LOCATION DETAILED: LEFT PROXIMAL DORSAL FOREARM

## 2025-06-11 ASSESSMENT — LOCATION SIMPLE DESCRIPTION DERM
LOCATION SIMPLE: RIGHT UPPER BACK
LOCATION SIMPLE: LEFT POSTERIOR UPPER ARM
LOCATION SIMPLE: RIGHT HAND
LOCATION SIMPLE: RIGHT POSTERIOR UPPER ARM
LOCATION SIMPLE: LEFT FOREARM
LOCATION SIMPLE: RIGHT CHEEK
LOCATION SIMPLE: LEFT HAND
LOCATION SIMPLE: CHEST
LOCATION SIMPLE: LEFT CHEEK
LOCATION SIMPLE: RIGHT THIGH
LOCATION SIMPLE: RIGHT FOREARM
LOCATION SIMPLE: RIGHT POSTERIOR THIGH
LOCATION SIMPLE: ABDOMEN
LOCATION SIMPLE: LEFT THIGH
LOCATION SIMPLE: UPPER BACK
LOCATION SIMPLE: LEFT POSTERIOR THIGH
LOCATION SIMPLE: RIGHT LOWER BACK

## 2025-06-11 ASSESSMENT — LOCATION ZONE DERM
LOCATION ZONE: HAND
LOCATION ZONE: ARM
LOCATION ZONE: LEG
LOCATION ZONE: FACE
LOCATION ZONE: TRUNK

## 2025-06-12 RX ORDER — CLASCOTERONE 1 G/100G
CREAM TOPICAL BID
Qty: 60 | Refills: 2 | Status: ERX

## 2025-06-17 ENCOUNTER — RX ONLY (RX ONLY)
Age: 60
End: 2025-06-17

## 2025-06-17 RX ORDER — SULFACETAMIDE SODIUM 100 MG/ML
LOTION TOPICAL
Qty: 118 | Refills: 2 | Status: ERX | COMMUNITY
Start: 2025-06-16

## 2025-08-20 ENCOUNTER — APPOINTMENT (OUTPATIENT)
Dept: URBAN - METROPOLITAN AREA CLINIC 4 | Facility: CLINIC | Age: 60
Setting detail: DERMATOLOGY
End: 2025-08-20

## 2025-08-20 DIAGNOSIS — D49.2 NEOPLASM OF UNSPECIFIED BEHAVIOR OF BONE, SOFT TISSUE, AND SKIN: ICD-10-CM

## 2025-08-20 DIAGNOSIS — L70.0 ACNE VULGARIS: ICD-10-CM

## 2025-08-20 DIAGNOSIS — Z71.89 OTHER SPECIFIED COUNSELING: ICD-10-CM

## 2025-08-20 PROCEDURE — ? ORDER TESTS

## 2025-08-20 PROCEDURE — ? ADDITIONAL NOTES

## 2025-08-20 PROCEDURE — ? SUNSCREEN RECOMMENDATIONS

## 2025-08-20 ASSESSMENT — LOCATION SIMPLE DESCRIPTION DERM
LOCATION SIMPLE: RIGHT HAND
LOCATION SIMPLE: LEFT HAND

## 2025-08-20 ASSESSMENT — LOCATION DETAILED DESCRIPTION DERM
LOCATION DETAILED: RIGHT RADIAL DORSAL HAND
LOCATION DETAILED: LEFT ULNAR DORSAL HAND

## 2025-08-20 ASSESSMENT — LOCATION ZONE DERM: LOCATION ZONE: HAND
